# Patient Record
Sex: FEMALE | Race: BLACK OR AFRICAN AMERICAN | NOT HISPANIC OR LATINO | Employment: FULL TIME | URBAN - METROPOLITAN AREA
[De-identification: names, ages, dates, MRNs, and addresses within clinical notes are randomized per-mention and may not be internally consistent; named-entity substitution may affect disease eponyms.]

---

## 2022-03-02 ENCOUNTER — HISTORICAL (OUTPATIENT)
Dept: RADIOLOGY | Facility: HOSPITAL | Age: 62
End: 2022-03-02

## 2022-03-02 ENCOUNTER — HISTORICAL (OUTPATIENT)
Dept: ADMINISTRATIVE | Facility: HOSPITAL | Age: 62
End: 2022-03-02

## 2022-03-08 ENCOUNTER — HISTORICAL (OUTPATIENT)
Dept: RADIOLOGY | Facility: HOSPITAL | Age: 62
End: 2022-03-08

## 2022-03-08 ENCOUNTER — HISTORICAL (OUTPATIENT)
Dept: ADMINISTRATIVE | Facility: HOSPITAL | Age: 62
End: 2022-03-08

## 2022-03-18 ENCOUNTER — HISTORICAL (OUTPATIENT)
Dept: RADIOLOGY | Facility: HOSPITAL | Age: 62
End: 2022-03-18

## 2022-03-18 ENCOUNTER — HISTORICAL (OUTPATIENT)
Dept: ADMINISTRATIVE | Facility: HOSPITAL | Age: 62
End: 2022-03-18

## 2022-03-18 LAB — CREAT SERPL-MCNC: 0.85 MG/DL (ref 0.55–1.02)

## 2022-04-06 ENCOUNTER — HISTORICAL (OUTPATIENT)
Dept: ADMINISTRATIVE | Facility: HOSPITAL | Age: 62
End: 2022-04-06

## 2022-04-06 LAB
ALBUMIN SERPL-MCNC: 4.5 G/DL (ref 3.5–5)
ALBUMIN/GLOB SERPL: 1.3 {RATIO} (ref 1.1–2)
ALP SERPL-CCNC: 77 U/L (ref 40–150)
ALT SERPL-CCNC: 18 U/L (ref 0–55)
AST SERPL-CCNC: 18 U/L (ref 5–34)
BILIRUB SERPL-MCNC: 0.8 MG/DL
BILIRUBIN DIRECT+TOT PNL SERPL-MCNC: 0.3 (ref 0–0.5)
BILIRUBIN DIRECT+TOT PNL SERPL-MCNC: 0.5 (ref 0–0.8)
BUN SERPL-MCNC: 17.3 MG/DL (ref 9.8–20.1)
CALCIUM SERPL-MCNC: 10 MG/DL (ref 8.7–10.5)
CHLORIDE SERPL-SCNC: 102 MMOL/L (ref 98–107)
CO2 SERPL-SCNC: 28 MMOL/L (ref 22–29)
CREAT SERPL-MCNC: 0.8 MG/DL (ref 0.55–1.02)
GLOBULIN SER-MCNC: 3.5 G/DL (ref 2.4–3.5)
GLUCOSE SERPL-MCNC: 93 MG/DL (ref 74–100)
HEMOLYSIS INTERF INDEX SERPL-ACNC: <0
ICTERIC INTERF INDEX SERPL-ACNC: 1
LIPEMIC INTERF INDEX SERPL-ACNC: 2
POTASSIUM SERPL-SCNC: 4 MMOL/L (ref 3.5–5.1)
PROT SERPL-MCNC: 8 G/DL (ref 6.4–8.3)
SODIUM SERPL-SCNC: 138 MMOL/L (ref 136–145)

## 2022-04-09 ENCOUNTER — HISTORICAL (OUTPATIENT)
Dept: ADMINISTRATIVE | Facility: HOSPITAL | Age: 62
End: 2022-04-09
Payer: MEDICAID

## 2022-04-11 ENCOUNTER — HISTORICAL (OUTPATIENT)
Dept: SURGERY | Facility: HOSPITAL | Age: 62
End: 2022-04-11

## 2022-04-23 PROBLEM — Z72.0 TOBACCO USER: Status: ACTIVE | Noted: 2022-04-23

## 2022-04-26 VITALS
DIASTOLIC BLOOD PRESSURE: 110 MMHG | HEIGHT: 65 IN | WEIGHT: 127.19 LBS | BODY MASS INDEX: 21.19 KG/M2 | OXYGEN SATURATION: 100 % | SYSTOLIC BLOOD PRESSURE: 160 MMHG

## 2022-04-28 ENCOUNTER — HISTORICAL (OUTPATIENT)
Dept: ADMINISTRATIVE | Facility: HOSPITAL | Age: 62
End: 2022-04-28
Payer: MEDICAID

## 2022-04-28 LAB
ABS NEUT (OLG): 5.51 (ref 2.1–9.2)
ALBUMIN SERPL-MCNC: 4.3 G/DL (ref 3.5–5)
ALBUMIN/GLOB SERPL: 1.2 {RATIO} (ref 1.1–2)
ALP SERPL-CCNC: 72 U/L (ref 40–150)
ALT SERPL-CCNC: 15 U/L (ref 0–55)
AST SERPL-CCNC: 19 U/L (ref 5–34)
BASOPHILS # BLD AUTO: 0 10*3/UL (ref 0–0.2)
BASOPHILS NFR BLD AUTO: 1 %
BILIRUB SERPL-MCNC: 0.7 MG/DL
BILIRUBIN DIRECT+TOT PNL SERPL-MCNC: 0.2 (ref 0–0.5)
BILIRUBIN DIRECT+TOT PNL SERPL-MCNC: 0.5 (ref 0–0.8)
BUN SERPL-MCNC: 12.1 MG/DL (ref 9.8–20.1)
CALCIUM SERPL-MCNC: 10.3 MG/DL (ref 8.7–10.5)
CHLORIDE SERPL-SCNC: 103 MMOL/L (ref 98–107)
CO2 SERPL-SCNC: 29 MMOL/L (ref 22–29)
CREAT SERPL-MCNC: 0.74 MG/DL (ref 0.55–1.02)
EOSINOPHIL # BLD AUTO: 0.1 10*3/UL (ref 0–0.9)
EOSINOPHIL NFR BLD AUTO: 2 %
ERYTHROCYTE [DISTWIDTH] IN BLOOD BY AUTOMATED COUNT: 12.7 % (ref 11.5–14.5)
ESTRADIOL SERPL HS-MCNC: <24 PG/ML
FLAG2 (OHS): 70
FLAG3 (OHS): 80
FLAGS (OHS): 70
FSH SERPL-ACNC: 51.03 M[IU]/ML
GLOBULIN SER-MCNC: 3.6 G/DL (ref 2.4–3.5)
GLUCOSE SERPL-MCNC: 79 MG/DL (ref 74–100)
HCT VFR BLD AUTO: 42.8 % (ref 35–46)
HEMOLYSIS INTERF INDEX SERPL-ACNC: 1
HGB BLD-MCNC: 13.9 G/DL (ref 12–16)
ICTERIC INTERF INDEX SERPL-ACNC: 1
IMM GRANULOCYTES # BLD AUTO: 0.03 10*3/UL
IMM GRANULOCYTES NFR BLD AUTO: 0 %
IMM. NE 2 SUSPECT FLAG (OHS): 20
LIPEMIC INTERF INDEX SERPL-ACNC: 5
LOW EVENT # SUSPECT FLAG (OHS): 70
LYMPHOCYTES # BLD AUTO: 2.7 10*3/UL (ref 0.6–4.6)
LYMPHOCYTES NFR BLD AUTO: 30 %
MANUAL DIFF? (OHS): NO
MCH RBC QN AUTO: 30.6 PG (ref 26–34)
MCHC RBC AUTO-ENTMCNC: 32.5 G/DL (ref 31–37)
MCV RBC AUTO: 94.3 FL (ref 80–100)
MO BLASTS SUSPECT FLAG (OHS): 40
MONOCYTES # BLD AUTO: 0.6 10*3/UL (ref 0.1–1.3)
MONOCYTES NFR BLD AUTO: 7 %
NEUTROPHILS # BLD AUTO: 5.51 10*3/UL (ref 2.1–9.2)
NEUTROPHILS NFR BLD AUTO: 61 %
NRBC BLD AUTO-RTO: 0 % (ref 0–0.2)
PLATELET # BLD AUTO: 323 10*3/UL (ref 130–400)
PLATELET CLUMPS SUSPECT FLAG (OHS): 10
PMV BLD AUTO: 9.9 FL (ref 7.4–10.4)
POTASSIUM SERPL-SCNC: 4.3 MMOL/L (ref 3.5–5.1)
PROT SERPL-MCNC: 7.9 G/DL (ref 6.4–8.3)
RBC # BLD AUTO: 4.54 10*6/UL (ref 4–5.2)
SODIUM SERPL-SCNC: 140 MMOL/L (ref 136–145)
WBC # SPEC AUTO: 9.1 10*3/UL (ref 4.5–11)

## 2022-05-01 PROBLEM — I10 PRIMARY HYPERTENSION: Status: ACTIVE | Noted: 2022-05-01

## 2022-05-01 PROBLEM — E55.9 VITAMIN D DEFICIENCY: Status: ACTIVE | Noted: 2022-05-01

## 2022-05-01 PROBLEM — Z17.1 MALIGNANT NEOPLASM OF RIGHT BREAST IN FEMALE, ESTROGEN RECEPTOR NEGATIVE: Status: ACTIVE | Noted: 2022-05-01

## 2022-05-01 PROBLEM — C50.911 MALIGNANT NEOPLASM OF RIGHT BREAST IN FEMALE, ESTROGEN RECEPTOR NEGATIVE: Status: ACTIVE | Noted: 2022-05-01

## 2022-05-02 ENCOUNTER — OFFICE VISIT (OUTPATIENT)
Dept: FAMILY MEDICINE | Facility: CLINIC | Age: 62
End: 2022-05-02
Payer: MEDICAID

## 2022-05-02 VITALS
DIASTOLIC BLOOD PRESSURE: 88 MMHG | HEIGHT: 65 IN | WEIGHT: 120 LBS | BODY MASS INDEX: 19.99 KG/M2 | SYSTOLIC BLOOD PRESSURE: 134 MMHG | RESPIRATION RATE: 20 BRPM | TEMPERATURE: 98 F | OXYGEN SATURATION: 100 % | HEART RATE: 87 BPM

## 2022-05-02 DIAGNOSIS — E78.01 FAMILIAL HYPERCHOLESTEROLEMIA: Chronic | ICD-10-CM

## 2022-05-02 PROBLEM — E78.5 HLD (HYPERLIPIDEMIA): Chronic | Status: ACTIVE | Noted: 2022-05-02

## 2022-05-02 PROCEDURE — 99214 OFFICE O/P EST MOD 30 MIN: CPT | Mod: PBBFAC

## 2022-05-02 RX ORDER — PRAVASTATIN SODIUM 40 MG/1
40 TABLET ORAL DAILY
Qty: 30 TABLET | Refills: 1 | Status: SHIPPED | OUTPATIENT
Start: 2022-05-02 | End: 2022-08-08

## 2022-05-02 NOTE — PROGRESS NOTES
60 y/o female with a pmHx of HTN, tobacco use, ductal invasive carcinoma s/p a right sided mastectomy with sentinel node dissection who presents for follow up care.      Invasive Ductal Carcinoma:  S/p right sided mastectomy 4/11/22  To have mediport placed for four rounds of chemo   Pathology reveiwed and no malignancy        HTN:    BP today 134/88 on amlodipine 10 mg daily  Denies cp, SOB, palpitations, dizziness, edema of the extremities or other side effects    HLD:  Labs reviewed elevated , Tri 65, HDL 61,   ASCVD risk 14.3 %  Fmhx: mother-cva, father- CAD, sister-CAD, MI, brother-MI      HM:   No colonoscopy (Discussed with pt to get HM done after chemo)  No zoster vaccine   Covid Vaccine x 3 updated    Medications:   Amlodipine 10 mg daily  Vitamin D 50,000 IU every week  HCTZ 12.5 mg daily    KNDA     FmHx:  Diabetes mellitus type 1: Mother, Father and Sister. Heart disease: Father. Hypertension.: Mother, Father and Sister. Primary malignant neoplasm of bone: Brother. Primary malignant neoplasm of prostate: Brother. Stroke: Mother    Procedure/Surgical History   Biopsy or excision of lymph node(s); by needle, superficial (eg, cervical, inguinal, axillary) (03/08/2022)   Biopsy, breast, with placement of breast localization device(s) (eg, clip, metallic pellet), when performed, and imaging of the biopsy specimen, when performed, percutaneous; first lesion, including ultrasound guidance (03/08/2022)   Drainage of Right Breast, Percutaneous Approach, Diagnostic (03/08/2022)   Umbilical hernia repair (1966    Problem List/Past Medical History: Ongoing Tobacco user    ROS:  Review of Systems   Constitutional: Negative for chills and fever.   Respiratory: Negative for cough, shortness of breath and wheezing.    Cardiovascular: Negative for chest pain and palpitations.   Psychiatric/Behavioral: Negative for depression and suicidal ideas.           Physical Exam:  Vitals:    05/02/22 0845   BP: 134/88    Pulse: 87   Resp: 20   Temp: 98.1 °F (36.7 °C)     General: AAAO x 3  Cardiac: RRR, no murmurs or gallops  Pulm: BS CTA megha   GI: Abd soft, NT, ND, and bowel sds active x4   Ext: Edema megha BUE, BLE  Skin: right breast: 20 cm incision well approximated incision secured with steri-strips. No drainage, increased warmth or tenderness       A/P:  1. HTN  -Continue the amlodipine at 10 mg daily    2. HLD:  ASCVD risk 14.3 % start and stronf cardiovascular history. Started pravastatin 40 mg daily  Side effects of the medication discussed and symptoms to report to this provider and to stop the medication until seen by the provider    3. Invasive Ductal Carcinoma  Keep next oncology appointment on 5/13/22 at 10:30 am and to have an echo on 5/6/22 at 07:15     RTC in 6 weeks

## 2022-05-02 NOTE — PATIENT INSTRUCTIONS
HTN  -Continue the amlodipine at 10 mg daily     HLD:  ASCVD risk 14.3 % start and strong cardiovascular history. Started pravastatin 40 mg daily  Side effects of the medication discussed and symptoms to report to this provider and to stop the medication until seen by the provider     Invasive Ductal Carcinoma  Keep next oncology appointment on 5/13/22 at 10:30 am and to have an echo on 5/6/22 at 07:15      RTC in 6 weeks  -Continue the amlodipine at 10 mg daily

## 2022-05-05 RX ORDER — HYDROCHLOROTHIAZIDE 12.5 MG/1
12.5 CAPSULE ORAL DAILY
Qty: 90 CAPSULE | Refills: 0 | Status: SHIPPED | OUTPATIENT
Start: 2022-05-05 | End: 2022-10-28 | Stop reason: SDUPTHER

## 2022-05-05 RX ORDER — HYDROCHLOROTHIAZIDE 12.5 MG/1
12.5 CAPSULE ORAL DAILY
Qty: 90 CAPSULE | Refills: 0 | Status: SHIPPED | OUTPATIENT
Start: 2022-05-05 | End: 2022-05-05 | Stop reason: SDUPTHER

## 2022-05-05 RX ORDER — AMLODIPINE BESYLATE 10 MG/1
10 TABLET ORAL DAILY
Qty: 90 TABLET | Refills: 0 | Status: SHIPPED | OUTPATIENT
Start: 2022-05-05 | End: 2022-05-05 | Stop reason: SDUPTHER

## 2022-05-05 RX ORDER — AMLODIPINE BESYLATE 10 MG/1
10 TABLET ORAL DAILY
Qty: 90 TABLET | Refills: 0 | Status: SHIPPED | OUTPATIENT
Start: 2022-05-05 | End: 2022-08-08 | Stop reason: SDUPTHER

## 2022-05-06 ENCOUNTER — HOSPITAL ENCOUNTER (OUTPATIENT)
Dept: CARDIOLOGY | Facility: HOSPITAL | Age: 62
Discharge: HOME | End: 2022-05-06
Attending: INTERNAL MEDICINE
Payer: MEDICAID

## 2022-05-06 DIAGNOSIS — C50.919 BREAST CARCINOMA: Primary | ICD-10-CM

## 2022-05-06 DIAGNOSIS — C50.919 BREAST CARCINOMA: ICD-10-CM

## 2022-05-06 DIAGNOSIS — C50.919 CARCINOMA OF FEMALE BREAST, UNSPECIFIED ESTROGEN RECEPTOR STATUS, UNSPECIFIED LATERALITY, UNSPECIFIED SITE OF BREAST: Primary | ICD-10-CM

## 2022-05-06 LAB
AV INDEX (PROSTH): 0.65
AV MEAN GRADIENT: 3 MMHG
AV PEAK GRADIENT: 5 MMHG
AV VELOCITY RATIO: 0.69
CV ECHO LV RWT: 0.43 CM
DOP CALC AO PEAK VEL: 1.08 M/S
DOP CALC AO VTI: 24.9 CM
DOP CALC LVOT PEAK VEL: 0.75 M/S
DOP CALCLVOT PEAK VEL VTI: 16.2 CM
E WAVE DECELERATION TIME: 198.67 MSEC
E/A RATIO: 0.78
ECHO EF ESTIMATED: 52 %
ECHO LV POSTERIOR WALL: 0.89 CM (ref 0.6–1.1)
EJECTION FRACTION: 55 %
FRACTIONAL SHORTENING: 27 % (ref 28–44)
INTERVENTRICULAR SEPTUM: 0.87 CM (ref 0.6–1.1)
LEFT ATRIUM VOLUME MOD: 7.78 CM3
LEFT INTERNAL DIMENSION IN SYSTOLE: 3.04 CM (ref 2.1–4)
LEFT VENTRICLE DIASTOLIC VOLUME: 75.85 ML
LEFT VENTRICLE SYSTOLIC VOLUME: 36.22 ML
LEFT VENTRICULAR INTERNAL DIMENSION IN DIASTOLE: 4.14 CM (ref 3.5–6)
LEFT VENTRICULAR MASS: 112.44 G
LV LATERAL E/E' RATIO: 7.5 M/S
LVOT MG: 1.35 MMHG
LVOT MV: 0.55 CM/S
MV PEAK A VEL: 0.96 M/S
MV PEAK E VEL: 0.75 M/S
PISA MRMAX VEL: 4.15 M/S
PISA TR MAX VEL: 2.42 M/S
RA WIDTH: 3.4 CM
TDI LATERAL: 0.1 M/S
TR MAX PG: 23 MMHG
TRICUSPID ANNULAR PLANE SYSTOLIC EXCURSION: 1.64 CM

## 2022-05-06 PROCEDURE — 93306 TTE W/DOPPLER COMPLETE: CPT

## 2022-05-13 ENCOUNTER — OFFICE VISIT (OUTPATIENT)
Dept: HEMATOLOGY/ONCOLOGY | Facility: CLINIC | Age: 62
End: 2022-05-13
Payer: MEDICAID

## 2022-05-13 VITALS
HEART RATE: 114 BPM | SYSTOLIC BLOOD PRESSURE: 157 MMHG | WEIGHT: 124.38 LBS | DIASTOLIC BLOOD PRESSURE: 94 MMHG | BODY MASS INDEX: 21.24 KG/M2 | OXYGEN SATURATION: 100 % | TEMPERATURE: 100 F | HEIGHT: 64 IN

## 2022-05-13 DIAGNOSIS — C50.911 INVASIVE DUCTAL CARCINOMA OF BREAST, FEMALE, RIGHT: Primary | ICD-10-CM

## 2022-05-13 DIAGNOSIS — Z85.3 HISTORY OF RIGHT BREAST CANCER: Primary | ICD-10-CM

## 2022-05-13 PROCEDURE — 3008F PR BODY MASS INDEX (BMI) DOCUMENTED: ICD-10-PCS | Mod: CPTII,,, | Performed by: NURSE PRACTITIONER

## 2022-05-13 PROCEDURE — 1160F PR REVIEW ALL MEDS BY PRESCRIBER/CLIN PHARMACIST DOCUMENTED: ICD-10-PCS | Mod: CPTII,,, | Performed by: NURSE PRACTITIONER

## 2022-05-13 PROCEDURE — 3080F PR MOST RECENT DIASTOLIC BLOOD PRESSURE >= 90 MM HG: ICD-10-PCS | Mod: CPTII,,, | Performed by: NURSE PRACTITIONER

## 2022-05-13 PROCEDURE — 3008F BODY MASS INDEX DOCD: CPT | Mod: CPTII,,, | Performed by: NURSE PRACTITIONER

## 2022-05-13 PROCEDURE — 3080F DIAST BP >= 90 MM HG: CPT | Mod: CPTII,,, | Performed by: NURSE PRACTITIONER

## 2022-05-13 PROCEDURE — 1159F MED LIST DOCD IN RCRD: CPT | Mod: CPTII,,, | Performed by: NURSE PRACTITIONER

## 2022-05-13 PROCEDURE — 99215 OFFICE O/P EST HI 40 MIN: CPT | Mod: PBBFAC | Performed by: NURSE PRACTITIONER

## 2022-05-13 PROCEDURE — 1160F RVW MEDS BY RX/DR IN RCRD: CPT | Mod: CPTII,,, | Performed by: NURSE PRACTITIONER

## 2022-05-13 PROCEDURE — 1159F PR MEDICATION LIST DOCUMENTED IN MEDICAL RECORD: ICD-10-PCS | Mod: CPTII,,, | Performed by: NURSE PRACTITIONER

## 2022-05-13 PROCEDURE — 3077F SYST BP >= 140 MM HG: CPT | Mod: CPTII,,, | Performed by: NURSE PRACTITIONER

## 2022-05-13 PROCEDURE — 99214 PR OFFICE/OUTPT VISIT, EST, LEVL IV, 30-39 MIN: ICD-10-PCS | Mod: S$PBB,,, | Performed by: NURSE PRACTITIONER

## 2022-05-13 PROCEDURE — 3077F PR MOST RECENT SYSTOLIC BLOOD PRESSURE >= 140 MM HG: ICD-10-PCS | Mod: CPTII,,, | Performed by: NURSE PRACTITIONER

## 2022-05-13 PROCEDURE — 99214 OFFICE O/P EST MOD 30 MIN: CPT | Mod: S$PBB,,, | Performed by: NURSE PRACTITIONER

## 2022-05-13 RX ORDER — HYDROCODONE BITARTRATE AND ACETAMINOPHEN 5; 325 MG/1; MG/1
TABLET ORAL
COMMUNITY
Start: 2022-04-11 | End: 2022-05-13 | Stop reason: SDUPTHER

## 2022-05-13 RX ORDER — DICLOFENAC SODIUM 75 MG/1
75 TABLET, DELAYED RELEASE ORAL
COMMUNITY
Start: 2021-12-30 | End: 2022-05-20 | Stop reason: CLARIF

## 2022-05-13 RX ORDER — AMLODIPINE BESYLATE 10 MG/1
10 TABLET ORAL
COMMUNITY
Start: 2022-04-08 | End: 2022-05-20 | Stop reason: ALTCHOICE

## 2022-05-13 RX ORDER — ONDANSETRON 8 MG/1
8 TABLET, ORALLY DISINTEGRATING ORAL EVERY 8 HOURS PRN
Qty: 90 TABLET | Refills: 2 | Status: SHIPPED | OUTPATIENT
Start: 2022-05-13 | End: 2022-08-08

## 2022-05-13 RX ORDER — ERGOCALCIFEROL 1.25 MG/1
50000 CAPSULE ORAL
COMMUNITY
Start: 2022-04-08 | End: 2022-07-01

## 2022-05-13 RX ORDER — DOCUSATE SODIUM 100 MG/1
100 CAPSULE, LIQUID FILLED ORAL
COMMUNITY
Start: 2022-04-11 | End: 2022-08-08

## 2022-05-13 RX ORDER — METHOCARBAMOL 750 MG/1
TABLET, FILM COATED ORAL
COMMUNITY
Start: 2022-04-11 | End: 2022-08-08

## 2022-05-13 RX ORDER — HYDROCODONE BITARTRATE AND ACETAMINOPHEN 5; 325 MG/1; MG/1
TABLET ORAL
Qty: 42 TABLET | Refills: 0 | Status: SHIPPED | OUTPATIENT
Start: 2022-05-13 | End: 2022-08-08

## 2022-05-13 NOTE — PROGRESS NOTES
Problem List:   IDC Right Breast, Triple Negative    History of Present Illness Past medical history: Tobacco abuse. Hypertension. Arthritis of right shoulder.    Procedure/surgical history:  Umbilical hernia repair .    Social history: . Lives in [place], [place]. Has 4 children. Works as a  in a [employer]. Has been smoking 5 cigarettes daily for 15 years. Drinks half a pint of liquor over the weekend. No illicit drugs.    Family history:  2 brothers experienced prostate cancer in their 50s  Father  from prostate cancer in his 60s-70s  No family history of breast cancer.    Menstrual and OB/GYN history: Menarche, age 14. Menopause, late 40s. First child at age 26 years. No abortions or miscarriages. Used birth control pills for 8 years. Did not use hormone replacement therapy after menopause. Breast-fed 1 child for 7 months.    Current Treatment:      Treatment History:  2022: Right mastectomy, right SLN biopsy      History of Present Illness:  61-year-old female referred from surgery clinic, with newly diagnosed right breast cancer.    She never palpated breast mass. She noticed breast/chest wall pain after extensive muscle use in 2021. At that time, she was diagnosed with musculoskeletal pain. In January, she went back to emergency department when symptoms did not resolve. At that time, physical examination apparently noted irregularity in breast and she was referred for further imaging and biopsy.      Interval History 2022: Patient presents with her son today for chemotherapy teaching on DDAC and Taxol. Discussed treatment regimen and potential side effects. We also discussed treatment options for any presenting symptoms. Patient consents to starting DDAC post mediport placement which is scheduled for 2022.      ROS:  Constitutional: No fever, chills, weight loss, weakness or fatigue  Eye: No visual disturbances or changes in vision, no double  vision  ENMT: no decreased hearing, nasal congestion, nosebleeds, sore throat, taste disturbance, tinnitus, vertigo  Respiratory: No shortness of breath, cough, sputum production, hemoptysis or pleuritic type chest pain  Cardiovascular: No chest pain, palpitations, syncope, leg swelling  Gastrointestinal: No nausea, vomiting, diarrhea, constipation, heartburn, abdominal pain  Genitourinary: No CVA tenderness  Hematology/lymph: no abnormal bruising, hemorrhage, petechiae swollen lymph glands  Musculoskeletal: No back or neck pain, joint pain, muscle pain or decreased range of motion  Integumentary: No rash, pruritus, skin lesion or any other significant skin complaints  Neurologic: alert and oriented x4, no abnormal balance, confusion, numbness, tingling, headache  Psychiatric: No anxiety, depression, suicidal or homicidal ideations  12 point ROS done in full with pertinent positives as described in interval history. Remainder of ROS are negative.     Patient Instructions  Discussed:  *Goals of therapy to be:Curative  * Premedication  *Chemotherapy agents  Chemotherapy education:   Discussed common side effects to include nausea and vomiting, which is quite manageable, alopecia, taste changes, mouth ulcers, fatigue, fertility issues, low blood counts that may require transfusion, peripheral neuropathy (42% to 70%; grade 3/4: Less than 7%) arthralgia and or myalgias, and rare side effects including cardiac toxicity.  Discussed chemotherapy (Cytoxan, Adriamycin) to be given every 2 weeks for 4 cycles followed by Taxol every 2 weeks x4 cycles or Taxol once a week x12 weeks.   Notify our office for any problems or concerns, specifically shortness of breath, swelling, chest pain or fast heartbeat, intractable pain or nausea and vomiting, fever greater than 100.4, extreme fatigue or weakness.  Self-care tips include good hygiene in frequent handwashing to avoid infection, anti-nausea medicines to reduce nausea, soft  toothbrush and mouth rinses 3 times a day with 1 teaspoon of baking soda with 8 ounces of water to prevent and treat mouth sores, avoid contact sports, avoid sun exposure and apply sunblock with SPF 30 or higher, drink 2-3 quarts of water every 24 hours, and maintaining good nutrition.  * Short term and long term effects of treatment  * Signs and symptoms to call clinic--->Notify our office for any problems or concerns, specifically shortness of breath, swelling, chest pain or fast heartbeat, intractable pain or nausea and vomiting, fever greater than 100.4, extreme fatigue or weakness.  * Written material given to patient  * All questions answered; patient verbalized understanding.      Review of Systems: A complete 12-point ROS was performed in full with pertinent positives as described in interval history. Remainder of ROS done in full and are negative.      Physical Exam    Vitals & Measurements  Vitals:    05/13/22 0838   BP: (!) 157/94   Pulse: (!) 114   Temp: 99.5 °F (37.5 °C)         Physical Exam:  General: Alert and oriented, No acute distress.   Appearance: Well-groomed wearing mask  HEENT: Normocephalic, Oral mucosa is moist. Pupils are equal, round and reactive to light, Extraocular movements are intact, Normal conjunctiva.   Neck: Supple, Non-tender, No lymphadenopathy, No thyromegaly.   Respiratory: Lungs are clear to auscultation, Respirations are non-labored, Breath sounds are equal, Symmetrical chest wall expansion.   Cardiovascular: Normal rate, Regular rhythm, No edema.   Breast: Right mastectomy, steri strips in place, without drainage. Left breast without any abnormalaties  Gastrointestinal: Rounded, Soft, Non-tender, Non-distended, Normal bowel sounds.   Musculoskeletal: Normal strength. Ambulates without assistance  Integumentary: Warm, dry, intact.   Neurologic: Alert, Oriented, No focal deficits, Cranial Nerves II-XII are grossly intact.   Cognition and Speech: Oriented, Speech clear and  coherent.   Psychiatric: Cooperative, Appropriate mood & affect.   ECOG Performance Scale: 0 - Capable of all self-care no restrictions    Assessment:  #IDC right breast, triple negative:  -03/02/2022: Bilateral diagnostic mammogram (chest wall pain)  -03/08/2022: Needle core biopsy  -04/11/2022: Right simple mastectomy and SLN biopsy  -2.2 cm, grade 3, no LVI, margins negative, 4 lymph nodes negative (including 3 negative SLN's)  >>   pT2 pN0 MX, G3, triple negative, AJCC anatomic stage IIA, pathological prognostic stage IIA      Plan:  -Assuming postmenopausal (last menstrual cycle in her late 40s)  -Check serum FSH and estradiol level because age <60    -Needs genetic testing because of triple negative breast cancer; also, family history of prostate cancer    -Has no signs and symptoms of metastatic disease, no indication of additional imaging studies    -pT2 pN0, status postmastectomy  -Does not need adjuvant radiotherapy    -Needs adjuvant chemotherapy  -We will treat with DD AC every 2 weeks x4 cycles, followed by adjuvant Taxol  -LVEF 55%-5/5/2022  -Mediport placement scheduled 5/19/2022    Proposed adjuvant chemotherapy:  1. Doxorubicin 60 mg/m² IV day 1;  2. Cyclophosphamide 600 mg/m² IV day 1;  Cycled every 14 days for 4 cycles; followed by,  3. Paclitaxel 175 mg/m² by 3 hour IV infusion on day 1, cycled every 14 days for 4 cycles; or, paclitaxel 80 mg/m² IV 1 hour infusion weekly for 12 weeks.  All dose-dense cycles are with myeloid growth factor support.    -Consider adjuvant bisphosphonate therapy for risk reduction of distant metastasis for 3-5 years in postmenopausal patients (natural or induced) with high risk node-negative or node positive tumors  >>>   -Will offer adjuvant bisphosphonate therapy to this patient    -Addition of 1 year of adjuvant olaparib is an option in this patient with pT2 triple negative tumor if germline testing returns positive for BRCA1/2 mutation    -RTC on 5/23/2022 with  CBC,CMP,MG  (Infusion nurses to review) if lab work WNL may proceed with cycle 1 of DDAC    Above discussed at length with her. All questions answered.  Chemotherapy teaching provided in detail with printed material  Discussed plan of management going forward.  She understands and agrees this plan, and was appreciative.

## 2022-05-14 NOTE — OP NOTE
Indication for Surgery  Ms. Ugalde is a 60 yo female with a?history of?HTN and tobacco?followed in clinic for R upper outer breast mass, biopsy proven invasive ductal carcinoma, ER-AR-HER2-. Discussion with patient held, and decision was made to proceed with R mastectomy and SLNB. Consents signed in clin  Preoperative Diagnosis  1) Right breast invasive ductal carcinoma  Postoperative Diagnosis  1) Right breast invasive ductal carcinoma  Operation  1) Right mastectomy  2) Right sentinel lymph node biopsy  Surgeon(s)  Staff: MD Silvio  Resident:  MD Sharmila Eason MD  Anesthesia  GETA  Estimated Blood Loss  30cc  Findings  Palpable mass in the right upper outer quadrant. 3 sentinel lymph nodes taken (1) hot/blue 1620, 2) hot/blue 880 3) hot/blue 160). Additional 1 palpable lymph node taken  Specimen(s)  3 sentinel lymph nodes taken (1) hot/blue 1620, 2) hot/blue 880 3) hot/blue 150). Additional 1 palpable lymph node taken  Technique  Prior to the operating room the patient was injected with Technetium-99 (TC-99) sulfur colloid by the nuclear medicine. After informed consent was obtained, the patient was then brought to the operating room and placed supine on the operative table. She underwent induction of general endotracheal anaesthesia with no acute adverse events.?5 cc?s of isosulfan blue dye was injected in the subareolar space and gently massaged on the rightside. The skin of right breast was prepped and draped in standard sterile surgical fashion along with the right axilla and upper arm. A time-out was completed verifying correct patient, procedure, site, positioning and equipment prior to beginning the procedure.?  ?  A large elliptical skin incision was made that encompassed the nipple-areola complex of the right breast. Flaps were raised in the avascular plane between subcutaneous tissue and breast tissue from the clavicle superiorly, the sternum medially, the anterior rectus sheath inferiorly, and the  lateral border of the pectoralis major muscle laterally. Hemostasis was achieved in the flaps. Next, the breast tissue and underlying pectoralis fascia were excised from the pectoralis major muscle, progressing from medial to lateral. At the lateral border of the pectoralis major muscle, the breast tissue was rotated laterally and a lateral pedicle was?identified where breast tissue gave way to fat of axilla. The breast tissue?was then completely removed and oriented with silks.  ?   Attention was then turned to the sentinel lymph node biopsy. Using a hand-held gamma probe ("[employer] the axilla was assessed for a sentinel lymph node. The lymph node was excised and the gamma probe was placed next to it which measured 1600s.?An additional lymph node was/ identified (and removed),with the probe reading in the 800s. A final lymph node with probe reading 160s?was palpated and taken as specimen. The lymph nodes were sent as specimen sentinel lymph node #1, #2, #3. Additional palpable but not hot or blue lymph node taken .  ?   Skin?was closed with interrupted 3-0?Monocryl sutures. The subdermal layers were closed with 4-0 PDS?subcuticular running skin closures. Steri-strips were then applied on top followed by 4x4 gauze.  ?   Dr. Anguiano was present and scrubbed for all key portions of the case  ?  Shane Eason, PGY-3  ?  This certifies I was present during the entire period between opening and closing of the surgical field.  ?  Heladio Anguiano MD  ?

## 2022-05-14 NOTE — H&P
Preoperative Updated H&P  ?   I have read the below H&P. The patient has not had any new changes in her medical history since then. She is overall feeling well today. She has no further questions at this time. She does not take blood thinners. She took her Norvasc this am. She denies SOB, CP, fevers, chills, nausea, or vomiting  ?   We will proceed with scheduled R mastectomy with sentinel lymph node biopsy.  ?   Kae Doll MD?  ?  Pt with triple negative 2.7 x 2.5cm IDC with a small satellite lesion  as well, here for a Right Total Mastectomy with Right Axillary SLN Bx.  Pt is appropriately prepared for procedure.  ?  Heladio Anguiano MD  ?  ?  ?  ?  ?  ?  ?  Chief Complaint  Right Breast Cancer  History of Present Illness  Ms. Ugalde is a 62 yo female with a?history of?HTN and tobacco use here for followup?after abnormal breast biopsy in March 2022. Biopsy showed R upper outer breast mass characterized as invasive carcinoma no specific type (ductal). Tubule formation score 3, nuclear pleomorphism score 3, mitotic count score 3 with overall grade 3 (score 9/9). Pathology reports triple negativity for?hormonal receptors estrogen, HER2, and progesterone.  ?  Today?patient?feels well?overall and is good spirts. She does endorse slight discomfort in the location of the mass. No other complaints at this time.  Patient has never palpated masses previously and has?not had any other?symptoms. Her last mammogram was in 2018 and was negative. Notes that current?workup significant?was made?after seeking care in urgent care clinic due to?breast/chest pain?after extensive muscle use in December of 2021. At the time she was diagnosed with?musculoskeletal pain. In January, went back to the ED after symptoms?did not resolve. Irregularity was palpated on physical exam and at that time they referred her to get a biopsy.  She has informed her family and friends of her diagnosis and has a lot of support. Continues to have a?good  appetite. Denies weight loss, fatigue, HA, weakness, SOB, chest pain, N/V, constipation and diarrhea.  ?  History:  Patient started menstruation at 15 yo.  On hormonal contraceptives throughout adult life.  Pregnancy history:   Past Surgical History:  umbilical hernia repair  FH:  mother: Type 1 DM, HTN, stroke  father: HTN, Type 1DM, Heart disease  Sister Type 1 DM, HTN  Paternal aunts and cousins with breast cancer  Allergies: none  SH: Lives at home with daughter and . Feels safe at home. Daughter graduating from [employer] school May 2022  Tobacco use: Smokes four cigarettes a day ?  Alcohol: Drinks half a pint of liquor on the weekends  ?  Review of Systems  Constitutional:?no fever, fatigue, weakness  Eye:?no vision loss  ENMT:?no sore throat  Respiratory:?no cough, no wheezing, no shortness of breath  Cardiovascular:?no chest pain, no palpitations,  Gastrointestinal:?no nausea, vomiting, or diarrhea. No abdominal pain  Genitourinary:?no dysuria, no urinary frequency or urgency, no hematuria  Hema/Lymph:?no abnormal bruising or bleeding  Endocrine:?no heat or cold intolerance, no excessive thirst or excessive urination  Musculoskeletal:?no muscle or joint pain, no joint swelling  skin:?no skin rash or abnormal lesion  Neuro: no headache, no dizziness, no weakness or numbness  ?  Physical Exam  ??Vitals & Measurements  ??HR:?94(Peripheral)? RR:?20? BP:?182/103?  ??HT:?158.00?cm? WT:?54.400?kg? BMI:?21.79?  General:?Alert and oriented, No acute distress.  Eye: Extraocular movements are intact  HENT:?Normal hearing.  Neck: Supple, Non-tender  Cardiovascular:?Normal rate, Regular rhythm.  Respiratory:?Lungs are clear to auscultation, Respirations are non-labored, Breath sounds are equal, Symmetrical chest wall expansion.  Breast: Right?lateral breast mass at 9 oclock position. (2.5 cm, solid, well circumscribed, nontender)  symmetric areolas bilaterally, no nipple discharge, erythema, or fibrocystic  changes.  Cardiovascular:?Normal rate, Regular rhythm.  Lymphatics: Palpable right axillary lymph node  MSK: normal strength, normal ROM  Neuro: Functional cognition intact.?  ?  Assessment/Plan  ?  Ms. Ugalde is a 60 yo f hx HTN and tobacco use here for after abnormal breast biopsy characterized as invasive carcinoma no specific type (ductal) with triple negative pathology. Surgical options including mastectomy vs lumpectomy?discussed and patient requests full removal of breast.  ?  Invasive carcinoma of right breast  -Will schedule Simple mastectomy with sentinel lymph node dissection  -Consult oncology for further management and recommendations  ?  Kiley Su, MS3  ?  PGY-1 Addendum  61-year-old female?with newly diagnosed hypertension?and history of tobacco use?presenting?to clinic?after biopsy confirmed?right?breast ER-, HI-, HER2- invasive?ductal carcinoma with negative ultrasound-guided axillary?lymph node biopsy.??Discussed risks, benefits, alternatives, complications?of simple?mastectomy of?right breast?with sentinel lymph node biopsy.? All questions answered.? Consent was obtained.??Proceed to the OR on 4/11/2022?for?right simple mastectomy with sentinel lymph node biopsy.? Patient with hypertension, has appointment to see?PCP?on 4/4/2022.  ?  Cookie Parr MD  [facility] General Surgery PGY-1  ?  Above Hx and assessment reviewed and discussed with Resident.  Agree with plan of care.  ?  Heladio Anguiano MD  Referrals  ?Clinic Follow up, *Est. 04/21/22 3:00:00 CDT, follow-up 1 week after mastectomy, Order for future visit, Invasive ductal carcinoma of right breast, [facility] [1]  [1]?Office Visit Note; Kiley Su 03/31/2022 13:57 CDT

## 2022-05-19 ENCOUNTER — OFFICE VISIT (OUTPATIENT)
Dept: SURGERY | Facility: CLINIC | Age: 62
End: 2022-05-19
Payer: MEDICAID

## 2022-05-19 VITALS
DIASTOLIC BLOOD PRESSURE: 85 MMHG | TEMPERATURE: 99 F | RESPIRATION RATE: 18 BRPM | OXYGEN SATURATION: 99 % | BODY MASS INDEX: 21.58 KG/M2 | HEART RATE: 100 BPM | HEIGHT: 63 IN | WEIGHT: 121.81 LBS | SYSTOLIC BLOOD PRESSURE: 125 MMHG

## 2022-05-19 DIAGNOSIS — Z01.818 PRE-OP TESTING: ICD-10-CM

## 2022-05-19 DIAGNOSIS — C50.919 TRIPLE NEGATIVE MALIGNANT NEOPLASM OF BREAST: Primary | ICD-10-CM

## 2022-05-19 PROCEDURE — 99215 OFFICE O/P EST HI 40 MIN: CPT | Mod: PBBFAC

## 2022-05-19 RX ORDER — SODIUM CHLORIDE 9 MG/ML
INJECTION, SOLUTION INTRAVENOUS CONTINUOUS
Status: CANCELLED | OUTPATIENT
Start: 2022-05-19

## 2022-05-19 RX ORDER — HEPARIN SODIUM 5000 [USP'U]/ML
5000 INJECTION, SOLUTION INTRAVENOUS; SUBCUTANEOUS ONCE
Status: CANCELLED | OUTPATIENT
Start: 2022-05-19

## 2022-05-19 NOTE — PROGRESS NOTES
Pt seen by Dr. Verduzco; Scheduled for a mediport placement on 23May2022; Pt instructed to return to clinic as needed; Discharge paperwork given w/pt verbalizing understanding

## 2022-05-19 NOTE — LETTER
May 19, 2022    Cookie Ugalde  [address] [address]             [facility]  [address] [address]  Phone: [phone] To whom it may concern,    Cookie Ugalde was seen in our clinic today to schedule her Mediport placement. Unfortunately, we are unable to schedule her until Monday. We should be able to get her done first case (07:00) so Mediport will be OK to access later that morning. We will ensure that her labs get drawn pre-operative.    Feel free to reach out with any questions,          Wilfred Verduzco II, MD

## 2022-05-19 NOTE — PROGRESS NOTES
[facility] General Surgery  New Patient Note    HPI:  60 yo F with a PMHx of Triple (-) IDC of the R breast (s/p Mastectomy 04/11/22) who presents today for scheduling of MediPort placement; pt tentatively to start ddAC on 05/23. No complaints of increasing pain / new symptoms. No new questions or concerns. Denies any previous central catheterization.    Vitals:  Vitals:    05/19/22 1201   BP: 125/85   Pulse: 100   Resp: 18   Temp: 99.3 °F (37.4 °C)   BMI 21.6    Physical Exam:  Gen: NAD  Neuro: awake, alert, answering questions appropriately  CV: RRR  Resp: non-labored breathing, EDWAR  Ext: moves all 4 spontaneously and purposefully  Skin: warm, well perfused  Breast: R mastectomy site with steristrips overlying, well-healing. Left neck with no scars. No scars on left chest wall.      Assessment/Plan:  60 yo F with a PMHx of Triple (-) IDC of the R breast (s/p Mastectomy 04/11) who presents today for scheduling of MediPort placement; pt tentatively to start ddAC on 05/23. The pt can be scheduled for the morning of 05/23 prior to initiation of chemo. Reviewed consents with patients. All questions and concerns addressed at this time.     Bc Perez   [facility] General Surgery, MS3  05/19/2022 12:03 PM         Addendum  Patient seen and examined.  Agree with above, edited as needed.    Scheduled for Mediport Monday morning. Unfortunately, unable to schedule before that. Scheduled to start chemo on Monday as well. Will attempt to get her done first case.    Patient instructed to let her Infusion Clinic here at [facility] know the plan. Provided with letter to bring to them as well, since she said she was heading to the clinic after this appt.    Consent obtained. Will get her labs drawn pre-op morning of surgery.    Wilfred Verduzco MD  HO-3, [facility] General Surgery  05/19/2022

## 2022-05-19 NOTE — PROGRESS NOTES
I have reviewed the note and assessment with the Resident and I agree with the documentation and plan of care.

## 2022-05-20 NOTE — HISTORICAL [facility]
This is a historical note converted from Barbara. Formatting and pictures may have been removed.  Please reference Barbara for original formatting and attached multimedia. History of Present Illness  Past medical history: Tobacco abuse.? Hypertension.? Arthritis of right shoulder.  Procedure/surgical history:  Umbilical hernia repair .  Social history:?.? Lives in [place] [id number].? Has 4 children.? 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 Has been smoking 5 cigarettes daily for 15 years.? Drinks half a pint of liquor over the weekend. ?No illicit drugs.  Family history:  2 brothers experienced prostate cancer?in their 50s  Father  from prostate cancer in his 60s-70s  No family history of breast cancer.  Menstrual and OB/GYN history:?Menarche, age 14.? Menopause, late 40s.? First child at age 26 years.? No abortions or miscarriages.? Used birth control pills?for 8 years.? Did not use hormone replacement therapy after menopause.? Breast-fed 1 child?for 7 months.  ?  ?  History of present illness:  61-year-old female referred from surgery clinic, with newly diagnosed right breast cancer.  ?  She never palpated breast mass.? She noticed breast/chest wall pain after extensive muscle use in 2021.? At that time, she was diagnosed with musculoskeletal pain.? In January, she went back to emergency department when symptoms did not resolve.? At that time, physical examination apparently noted irregularity in breast and she was referred for further imaging and biopsy.  ?  2022: Bilateral diagnostic mammogram with tomosynthesis, limited ultrasound right breast (indication: Right breast lump, upper outer quadrant) (comparison: 2018):  -Right breast: Suspicious abnormality (BI-RADS 4)  -Left breast: Negative (BI-RADS 1)  ?  2022:  1.? Right axillary lymph node with 3 mm cortex at 10:00, 10 cm from nipple, ultrasound-guided needle core biopsy: No evidence of carcinoma;  2.? Right  upper outer breast 2.6 x 1.5 x 2.7 cm mass at 10:00, 7 cm from nipple, ultrasound-guided needle core biopsy: Invasive ductal carcinoma; overall grade 3  ?  ER negative (0%).? WI negative (0%).? HER2 negative (score 0).? Ki-67 high proliferation (90%).  ?  03/18/2022: Bilateral diagnostic mammogram with contrast: Limited ultrasound right breast:  -Right breast: Known biopsy-proven malignancy (BI-RADS 5)  -Left breast: Negative (BI-RADS 1)  ?  04/11/2022: Right mastectomy, right SLN biopsy  ?  04/11/2022:  1.? Right breast, simple mastectomy: Invasive ductal carcinoma; overall grade 3; 2.2 cm; no DCIS; no LVI; all margins negative (closest margin, 5 mm; all regional lymph nodes negative for tumor; number of lymph nodes examined, 4; number of SLNs examined, 3; number of lymph nodes with metastasis, 0  >>pT2 pN0  ?  ?  04/28/2022:  Presents for initial medical oncology consultation, accompanied by her sister and daughters.? Very pleasant lady.? Appears healthy.? In no acute discomfort.  -Apart from postoperative right chest wall and right axillary pain, no other symptoms.? No weakness, fatigue, malaise, fevers, chills, unusual headaches, focal neurological symptoms, chest pain, cough, dyspnea, hemoptysis, abdominal pain, nausea, vomiting, anorexia, unintentional weight loss, postmenopausal spotting, bone pains, any urinary problems, etc.? ECOG 0.  ?  ?  Interval history:  ?  ?   Review of systems:  All systems reviewed, and found to be negative except for the symptoms detailed above.  ?  ?  Physical examination:  VITAL SIGNS:? Reviewed.? ?  GENERAL:? In no apparent distress.?  HEAD:? No signs of head trauma.  EYES:? Pupils are equal.? Extraocular motions intact.?  EARS:? Hearing grossly intact.  MOUTH:? Oropharynx is normal.  NECK:? No adenopathy, no JVD.? ?  CHEST:? Chest with clear breath sounds bilaterally.? No wheezes, rales, or rhonchi.?  CARDIAC:? Regular rate and rhythm.? S1 and S2, without murmurs, gallops, or  rubs.  VASCULAR:? No Edema.? Peripheral pulses normal and equal in all extremities.  ABDOMEN:? Soft, without detectable tenderness.? No sign of distention.? No? ?rebound or guarding, and no masses palpated.? ?Bowel Sounds normal.  MUSCULOSKELETAL:? Good range of motion of all major joints. Extremities without clubbing, cyanosis or edema.?  NEUROLOGIC EXAM:? Alert and oriented x 3.? No focal sensory or strength deficits.? ?Speech normal.? Follows commands.  PSYCHIATRIC:? Mood normal.  SKIN:? No rash or lesions.  ?  ?  Assessment:  #IDC right breast, triple negative:  -03/02/2022: Bilateral diagnostic mammogram (chest wall pain)  -03/08/2022: Needle core biopsy  -04/11/2022: Right simple mastectomy and SLN biopsy  -2.2 cm, grade 3, no LVI, margins negative, 4 lymph nodes negative (including 3 negative SLNs)  >>  pT2 pN0 MX, G3, triple negative, AJCC anatomic stage IIA, pathological prognostic stage IIA  ?  ?  Plan:  -Assuming postmenopausal?(last?menstrual cycle in her late 40s)  -Check serum FSH and estradiol level because age <60  ?  -Needs genetic testing because of triple negative breast cancer; also, family history of prostate cancer  ?  -Has no?signs and symptoms of metastatic disease, no indication of additional imaging studies  ?  -pT2 pN0, status postmastectomy  -Does not need adjuvant radiotherapy  ?  -Needs adjuvant chemotherapy  -We will treat with DD AC every 2 weeks x4 cycles, followed by adjuvant Taxol  -Will assess LVEF with TTE at this time  -Will refer to surgery for Mediport placement for chemotherapy  ?  Proposed adjuvant chemotherapy:  1. Doxorubicin 60 mg/m? IV day 1;  2. Cyclophosphamide 600 mg/m? IV day 1;  Cycled every 14 days for 4 cycles; followed by,  3.? Paclitaxel 175 mg/m? by 3 hour IV infusion on day 1, cycled every 14 days for 4 cycles; or, paclitaxel 80 mg/m? IV 1 hour infusion weekly for 12 weeks.  All dose-dense cycles are with myeloid growth factor support.  ?  -Consider adjuvant  bisphosphonate therapy for risk reduction of distant metastasis for 3-5 years in postmenopausal patients (natural or induced) with high risk node-negative or node positive tumors  >>>  -Will offer adjuvant bisphosphonate therapy to this patient  ?  -Addition of 1 year of adjuvant olaparib is an option in this patient with pT2 triple negative tumor if germline testing returns positive for BRCA1/2 mutation  ?  Follow-up visit in 2 weeks?after echocardiogram.  ?  Above discussed at length with her. ?All questions answered.  Discussed labs, scans, pathology report,?breast prognostic panel, and breast cancer staging, and shared?relevant reports with her.  Discussed plan of management going forward.  She understands and agrees this plan, and was appreciative.   Problem List/Past Medical History  Ongoing  Tobacco user  Historical  Pregnant  Pregnant  Pregnant  Pregnant  Procedure/Surgical History  Biopsy or excision of lymph node(s); open, deep axillary node(s) (04/11/2022)  Biopsy Sentinal Node (Right) (04/11/2022)  Excision of Right Axillary Lymphatic, Open Approach, Diagnostic (04/11/2022)  Excision of Right Breast, Open Approach (04/11/2022)  Injection procedure; radioactive tracer for identification of sentinel node (04/11/2022)  Mastectomy Simple (Right) (04/11/2022)  Mastectomy, simple, complete (04/11/2022)  Biopsy or excision of lymph node(s); by needle, superficial (eg, cervical, inguinal, axillary) (03/08/2022)  Biopsy, breast, with placement of breast localization device(s) (eg, clip, metallic pellet), when performed, and imaging of the biopsy specimen, when performed, percutaneous; first lesion, including ultrasound guidance (03/08/2022)  Drainage of Right Breast, Percutaneous Approach, Diagnostic (03/08/2022)  Umbilical hernia repair (1966)   Medications  amlodipine 10 mg oral tablet, 10 mg= 1 tab(s), Oral, Daily, 3 refills  amLODIPine 5 mg oral tablet, 5 mg= 1 tab(s), Oral, Daily,? ?Not Taking, Completed  Rx  clotrimazole 1% topical cream, TOP, BID,? ?Not Taking, Completed Rx  Colace 100 mg oral capsule, 100 mg= 1 cap(s), Oral, BID, PRN  diclofenac sodium 75 mg oral delayed release tablet, 75 mg= 1 tab(s), Oral, BID,? ?Not Taking, Completed Rx  ergocalciferol 50,000 intl units (1.25 mg) oral capsule, 31650 IntUnit= 1 cap(s), Oral, qWeek  hydroCHLOROthiazide 12.5 mg oral capsule, 12.5 mg= 1 cap(s), Oral, Daily, 3 refills  Allergies  No Known Allergies  Social History  Abuse/Neglect  No, No, Yes, 04/14/2022  No, 04/11/2022  No, 04/06/2022  Alcohol - Denies Alcohol Use, 02/05/2012  Current, Wine, 1-2 times per year, 05/26/2018  Substance Use - Denies Substance Abuse, 02/05/2012  Never, 05/26/2018  Tobacco - Denies Tobacco Use, 02/05/2012  4 or less cigarettes(less than 1/4 pack)/day in last 30 days, N/A, 04/14/2022  4 or less cigarettes(less than 1/4 pack)/day in last 30 days, Cigarettes, Yes, 04/11/2022  4 or less cigarettes(less than 1/4 pack)/day in last 30 days, No, 04/06/2022  Family History  Diabetes mellitus type 1.: Mother, Father and Sister.  Heart disease: Father.  Hypertension.: Mother, Father and Sister.  Primary malignant neoplasm of bone: Brother.  Primary malignant neoplasm of prostate: Brother.  Stroke: Mother.  Immunizations  Vaccine Date Status   COVID-19 MRNA, LNP-S, PF- Pfizer 03/30/2021 Given   COVID-19 MRNA, LNP-S, PF- Pfizer 03/09/2021 Given

## 2022-05-23 ENCOUNTER — TELEPHONE (OUTPATIENT)
Dept: HEMATOLOGY/ONCOLOGY | Facility: CLINIC | Age: 62
End: 2022-05-23
Payer: MEDICAID

## 2022-05-23 ENCOUNTER — INFUSION (OUTPATIENT)
Dept: INFUSION THERAPY | Facility: HOSPITAL | Age: 62
End: 2022-05-23
Attending: SURGERY
Payer: MEDICAID

## 2022-05-23 ENCOUNTER — ANESTHESIA EVENT (OUTPATIENT)
Dept: SURGERY | Facility: HOSPITAL | Age: 62
End: 2022-05-23
Payer: MEDICAID

## 2022-05-23 ENCOUNTER — ANESTHESIA (OUTPATIENT)
Dept: SURGERY | Facility: HOSPITAL | Age: 62
End: 2022-05-23
Payer: MEDICAID

## 2022-05-23 VITALS
DIASTOLIC BLOOD PRESSURE: 82 MMHG | HEART RATE: 72 BPM | OXYGEN SATURATION: 98 % | BODY MASS INDEX: 21.46 KG/M2 | SYSTOLIC BLOOD PRESSURE: 138 MMHG | RESPIRATION RATE: 18 BRPM | WEIGHT: 125.69 LBS | HEIGHT: 64 IN

## 2022-05-23 PROCEDURE — 63600175 PHARM REV CODE 636 W HCPCS: Performed by: NURSE ANESTHETIST, CERTIFIED REGISTERED

## 2022-05-23 PROCEDURE — 25000003 PHARM REV CODE 250: Performed by: NURSE ANESTHETIST, CERTIFIED REGISTERED

## 2022-05-23 PROCEDURE — 96523 IRRIG DRUG DELIVERY DEVICE: CPT

## 2022-05-23 RX ORDER — DEXAMETHASONE SODIUM PHOSPHATE 4 MG/ML
INJECTION, SOLUTION INTRA-ARTICULAR; INTRALESIONAL; INTRAMUSCULAR; INTRAVENOUS; SOFT TISSUE
Status: DISCONTINUED | OUTPATIENT
Start: 2022-05-23 | End: 2022-05-23

## 2022-05-23 RX ORDER — LIDOCAINE HCL/PF 100 MG/5ML
SYRINGE (ML) INTRAVENOUS
Status: DISCONTINUED | OUTPATIENT
Start: 2022-05-23 | End: 2022-05-23

## 2022-05-23 RX ORDER — ONDANSETRON 2 MG/ML
INJECTION INTRAMUSCULAR; INTRAVENOUS
Status: DISCONTINUED | OUTPATIENT
Start: 2022-05-23 | End: 2022-05-23

## 2022-05-23 RX ORDER — FENTANYL CITRATE 50 UG/ML
INJECTION, SOLUTION INTRAMUSCULAR; INTRAVENOUS
Status: DISCONTINUED | OUTPATIENT
Start: 2022-05-23 | End: 2022-05-23

## 2022-05-23 RX ORDER — PHENYLEPHRINE HYDROCHLORIDE 10 MG/ML
INJECTION INTRAVENOUS
Status: DISCONTINUED | OUTPATIENT
Start: 2022-05-23 | End: 2022-05-23

## 2022-05-23 RX ORDER — PROPOFOL 10 MG/ML
INJECTION, EMULSION INTRAVENOUS
Status: DISCONTINUED | OUTPATIENT
Start: 2022-05-23 | End: 2022-05-23

## 2022-05-23 RX ORDER — CEFAZOLIN SODIUM 1 G/3ML
INJECTION, POWDER, FOR SOLUTION INTRAMUSCULAR; INTRAVENOUS
Status: DISCONTINUED | OUTPATIENT
Start: 2022-05-23 | End: 2022-05-23

## 2022-05-23 RX ADMIN — LIDOCAINE HYDROCHLORIDE 60 MG: 20 INJECTION, SOLUTION INTRAVENOUS at 07:05

## 2022-05-23 RX ADMIN — PHENYLEPHRINE HYDROCHLORIDE 100 MCG: 10 INJECTION INTRAVENOUS at 07:05

## 2022-05-23 RX ADMIN — FENTANYL CITRATE 25 MCG: 50 INJECTION INTRAMUSCULAR; INTRAVENOUS at 07:05

## 2022-05-23 RX ADMIN — ONDANSETRON 4 MG: 2 INJECTION INTRAMUSCULAR; INTRAVENOUS at 07:05

## 2022-05-23 RX ADMIN — FENTANYL CITRATE 50 MCG: 50 INJECTION INTRAMUSCULAR; INTRAVENOUS at 07:05

## 2022-05-23 RX ADMIN — DEXAMETHASONE SODIUM PHOSPHATE 8 MG: 4 INJECTION, SOLUTION INTRA-ARTICULAR; INTRALESIONAL; INTRAMUSCULAR; INTRAVENOUS; SOFT TISSUE at 07:05

## 2022-05-23 RX ADMIN — CEFAZOLIN 2 G: 330 INJECTION, POWDER, FOR SOLUTION INTRAMUSCULAR; INTRAVENOUS at 07:05

## 2022-05-23 RX ADMIN — PROPOFOL 180 MG: 10 INJECTION, EMULSION INTRAVENOUS at 07:05

## 2022-05-23 NOTE — ANESTHESIA PREPROCEDURE EVALUATION
05/23/2022  Cookie Ugalde is a 61 y.o., female.    Active Ambulatory Problems     Diagnosis Date Noted    Tobacco user 04/23/2022    Primary hypertension 05/01/2022    Malignant neoplasm of right breast in female, estrogen receptor negative 05/01/2022    Vitamin D deficiency 05/01/2022    HLD (hyperlipidemia) 05/02/2022     Resolved Ambulatory Problems     Diagnosis Date Noted    No Resolved Ambulatory Problems     No Additional Past Medical History     .lab    Pre-op Assessment    I have reviewed the Patient Summary Reports.     I have reviewed the Nursing Notes. I have reviewed the NPO Status.   I have reviewed the Medications.     Review of Systems  Anesthesia Hx:  No previous Anesthesia  Neg history of prior surgery. Denies Family Hx of Anesthesia complications.   Denies Personal Hx of Anesthesia complications.   Social:  Non-Smoker    Hematology/Oncology:  Hematology Normal   Oncology Normal     EENT/Dental:EENT/Dental Normal   Cardiovascular:   Exercise tolerance: good Hypertension    Pulmonary:  Pulmonary Normal    Renal/:  Renal/ Normal     Hepatic/GI:  Hepatic/GI Normal    Neurological:  Neurology Normal    Endocrine:  Endocrine Normal    Dermatological:  Skin Normal    Psych:  Psychiatric Normal           Physical Exam  General: Well nourished, Cooperative, Alert and Oriented    Airway:  Mallampati: II / I/ II  Mouth Opening: Normal  TM Distance: Normal  Tongue: Large  Neck ROM: Normal ROM    Dental:  Intact    Abdomen:  Normal, Soft        Anesthesia Plan  Type of Anesthesia, risks & benefits discussed:    Anesthesia Type: Gen Supraglottic Airway  Intra-op Monitoring Plan: Standard ASA Monitors  Post Op Pain Control Plan: IV/PO Opioids PRN  Induction:  IV  Airway Plan: Direct  Informed Consent: Informed consent signed with the Patient representative and all parties understand the  risks and agree with anesthesia plan.  All questions answered. Patient consented to blood products? No  ASA Score: 3  Day of Surgery Review of History & Physical: H&P Update referred to the surgeon/provider.I have interviewed and examined the patient. I have reviewed the patient's H&P dated: There are no significant changes. H&P completed by Anesthesiologist.    Ready For Surgery From Anesthesia Perspective.     .

## 2022-05-23 NOTE — TRANSFER OF CARE
Anesthesia Transfer of Care Note    Patient: Cookie Ugalde    Procedure(s) Performed: Procedure(s) (LRB):  Placement, Mediport (Left)    Patient location: PACU    Anesthesia Type: general    Transport from OR: Transported from OR on room air with adequate spontaneous ventilation    Post pain: adequate analgesia    Post assessment: no apparent anesthetic complications    Post vital signs: stable    Level of consciousness: sedated    Nausea/Vomiting: no nausea/vomiting    Complications: none    Transfer of care protocol was followed      Last vitals:   See PACU RN documentation

## 2022-05-23 NOTE — ANESTHESIA PROCEDURE NOTES
Intubation    Date/Time: 5/23/2022 7:05 AM  Performed by: Odalys Garza CRNA  Authorized by: Viky Su MD     Intubation:     Induction:  Intravenous    Intubated:  Postinduction    Mask Ventilation:  Easy mask    Attempts:  1    Attempted By:  CRNA    Difficult Airway Encountered?: No      Complications:  None    Airway Device:  Supraglottic airway/LMA    Airway Device Size:  3.0    Style/Cuff Inflation:  Uncuffed    Placement Verified By:  Capnometry    Complicating Factors:  None    Findings Post-Intubation:  BS equal bilateral and atraumatic/condition of teeth unchanged

## 2022-05-23 NOTE — ANESTHESIA POSTPROCEDURE EVALUATION
Anesthesia Post Evaluation    Patient: Cookie Ugalde    Procedure(s) Performed: Procedure(s) (LRB):  Placement, Mediport (Left)    Final Anesthesia Type: general      Patient location during evaluation: PACU  Patient participation: Yes- Able to Participate  Level of consciousness: awake and responds to stimulation  Post-procedure vital signs: reviewed and stable  Pain management: adequate  Airway patency: patent    PONV status at discharge: No PONV  Anesthetic complications: no      Cardiovascular status: blood pressure returned to baseline  Respiratory status: unassisted  Hydration status: euvolemic  Follow-up not needed.      Vitals:    05/23/22 0855   BP: (!) 143/98   Pulse: 73   Resp: 18   Temp: 36.6 °C (97.9 °F)       Vitals:    05/23/22 0607   BP: 125/72   Pulse: 78   Resp: 20   Temp: 36.2 °C (97.2 °F)       Vitals    Taken Time   BP  05/23/22 0841   Temp  05/23/22 0841   Pulse  05/23/22 0841   Resp  05/23/22 0841   SpO2  05/23/22 0841         No case tracking events are documented in the log.      Pain/Timoteo Score: No data recorded

## 2022-05-30 ENCOUNTER — INFUSION (OUTPATIENT)
Dept: INFUSION THERAPY | Facility: HOSPITAL | Age: 62
End: 2022-05-30
Attending: INTERNAL MEDICINE
Payer: MEDICAID

## 2022-05-30 VITALS
SYSTOLIC BLOOD PRESSURE: 141 MMHG | RESPIRATION RATE: 18 BRPM | DIASTOLIC BLOOD PRESSURE: 86 MMHG | TEMPERATURE: 98 F | HEART RATE: 80 BPM | OXYGEN SATURATION: 99 % | WEIGHT: 127.19 LBS | BODY MASS INDEX: 21.83 KG/M2

## 2022-05-30 DIAGNOSIS — Z17.1 MALIGNANT NEOPLASM OF AREOLA OF RIGHT BREAST IN FEMALE, ESTROGEN RECEPTOR NEGATIVE: Primary | ICD-10-CM

## 2022-05-30 DIAGNOSIS — C50.011 MALIGNANT NEOPLASM OF AREOLA OF RIGHT BREAST IN FEMALE, ESTROGEN RECEPTOR NEGATIVE: Primary | ICD-10-CM

## 2022-05-30 PROCEDURE — 96375 TX/PRO/DX INJ NEW DRUG ADDON: CPT

## 2022-05-30 PROCEDURE — 96367 TX/PROPH/DG ADDL SEQ IV INF: CPT

## 2022-05-30 PROCEDURE — 96413 CHEMO IV INFUSION 1 HR: CPT

## 2022-05-30 PROCEDURE — A4216 STERILE WATER/SALINE, 10 ML: HCPCS | Performed by: INTERNAL MEDICINE

## 2022-05-30 PROCEDURE — 25000003 PHARM REV CODE 250: Performed by: INTERNAL MEDICINE

## 2022-05-30 PROCEDURE — 63600175 PHARM REV CODE 636 W HCPCS: Performed by: INTERNAL MEDICINE

## 2022-05-30 PROCEDURE — 63600175 PHARM REV CODE 636 W HCPCS: Performed by: NURSE PRACTITIONER

## 2022-05-30 PROCEDURE — 96411 CHEMO IV PUSH ADDL DRUG: CPT

## 2022-05-30 RX ORDER — DOXORUBICIN HYDROCHLORIDE 2 MG/ML
60 INJECTION, SOLUTION INTRAVENOUS
Status: COMPLETED | OUTPATIENT
Start: 2022-05-30 | End: 2022-05-30

## 2022-05-30 RX ORDER — DIPHENHYDRAMINE HYDROCHLORIDE 50 MG/ML
25 INJECTION INTRAMUSCULAR; INTRAVENOUS
Status: COMPLETED | OUTPATIENT
Start: 2022-05-30 | End: 2022-05-30

## 2022-05-30 RX ORDER — SODIUM CHLORIDE 0.9 % (FLUSH) 0.9 %
10 SYRINGE (ML) INJECTION
Status: DISCONTINUED | OUTPATIENT
Start: 2022-05-30 | End: 2022-05-30 | Stop reason: HOSPADM

## 2022-05-30 RX ORDER — HEPARIN SODIUM (PORCINE) LOCK FLUSH IV SOLN 100 UNIT/ML 100 UNIT/ML
100 SOLUTION INTRAVENOUS
Status: DISCONTINUED | OUTPATIENT
Start: 2022-05-30 | End: 2022-05-30 | Stop reason: HOSPADM

## 2022-05-30 RX ADMIN — SODIUM CHLORIDE: 9 INJECTION, SOLUTION INTRAVENOUS at 09:05

## 2022-05-30 RX ADMIN — CYCLOPHOSPHAMIDE 960 MG: 1 INJECTION INTRAVENOUS at 10:05

## 2022-05-30 RX ADMIN — DOXORUBICIN HYDROCHLORIDE 96 MG: 2 INJECTION, SOLUTION INTRAVENOUS at 10:05

## 2022-05-30 RX ADMIN — SODIUM CHLORIDE, PRESERVATIVE FREE 10 ML: 5 INJECTION INTRAVENOUS at 11:05

## 2022-05-30 RX ADMIN — APREPITANT 130 MG: 130 INJECTION, EMULSION INTRAVENOUS at 09:05

## 2022-05-30 RX ADMIN — DIPHENHYDRAMINE HYDROCHLORIDE 25 MG: 50 INJECTION, SOLUTION INTRAMUSCULAR; INTRAVENOUS at 09:05

## 2022-05-30 RX ADMIN — DEXAMETHASONE SODIUM PHOSPHATE 0.25 MG: 4 INJECTION, SOLUTION INTRA-ARTICULAR; INTRALESIONAL; INTRAMUSCULAR; INTRAVENOUS; SOFT TISSUE at 09:05

## 2022-05-30 RX ADMIN — HEPARIN SODIUM (PORCINE) LOCK FLUSH IV SOLN 100 UNIT/ML 100 UNITS: 100 SOLUTION at 11:05

## 2022-05-31 ENCOUNTER — INFUSION (OUTPATIENT)
Dept: INFUSION THERAPY | Facility: HOSPITAL | Age: 62
End: 2022-05-31
Attending: INTERNAL MEDICINE
Payer: MEDICAID

## 2022-05-31 VITALS
DIASTOLIC BLOOD PRESSURE: 83 MMHG | SYSTOLIC BLOOD PRESSURE: 117 MMHG | RESPIRATION RATE: 20 BRPM | TEMPERATURE: 98 F | HEART RATE: 73 BPM

## 2022-05-31 DIAGNOSIS — Z17.1 MALIGNANT NEOPLASM OF AREOLA OF RIGHT BREAST IN FEMALE, ESTROGEN RECEPTOR NEGATIVE: Primary | ICD-10-CM

## 2022-05-31 DIAGNOSIS — C50.011 MALIGNANT NEOPLASM OF AREOLA OF RIGHT BREAST IN FEMALE, ESTROGEN RECEPTOR NEGATIVE: Primary | ICD-10-CM

## 2022-05-31 PROCEDURE — 96372 THER/PROPH/DIAG INJ SC/IM: CPT

## 2022-05-31 PROCEDURE — 63600175 PHARM REV CODE 636 W HCPCS: Mod: TB | Performed by: INTERNAL MEDICINE

## 2022-05-31 RX ADMIN — PEGFILGRASTIM-CBQV 6 MG: 6 INJECTION, SOLUTION SUBCUTANEOUS at 02:05

## 2022-06-05 DIAGNOSIS — C50.919 CARCINOMA OF FEMALE BREAST, UNSPECIFIED ESTROGEN RECEPTOR STATUS, UNSPECIFIED LATERALITY, UNSPECIFIED SITE OF BREAST: Primary | ICD-10-CM

## 2022-06-06 ENCOUNTER — OFFICE VISIT (OUTPATIENT)
Dept: HEMATOLOGY/ONCOLOGY | Facility: CLINIC | Age: 62
End: 2022-06-06
Payer: MEDICAID

## 2022-06-06 VITALS
DIASTOLIC BLOOD PRESSURE: 82 MMHG | HEART RATE: 90 BPM | TEMPERATURE: 98 F | WEIGHT: 126.38 LBS | SYSTOLIC BLOOD PRESSURE: 123 MMHG | BODY MASS INDEX: 21.57 KG/M2 | RESPIRATION RATE: 18 BRPM | HEIGHT: 64 IN

## 2022-06-06 DIAGNOSIS — C50.919 TRIPLE NEGATIVE MALIGNANT NEOPLASM OF BREAST: Primary | ICD-10-CM

## 2022-06-06 DIAGNOSIS — Z51.11 ENCOUNTER FOR CHEMOTHERAPY MANAGEMENT: ICD-10-CM

## 2022-06-06 PROBLEM — Z17.421 TRIPLE NEGATIVE MALIGNANT NEOPLASM OF BREAST: Status: ACTIVE | Noted: 2022-06-06

## 2022-06-06 PROCEDURE — 99214 OFFICE O/P EST MOD 30 MIN: CPT | Mod: S$PBB,,, | Performed by: NURSE PRACTITIONER

## 2022-06-06 PROCEDURE — 83735 ASSAY OF MAGNESIUM: CPT | Performed by: NURSE PRACTITIONER

## 2022-06-06 PROCEDURE — 99213 OFFICE O/P EST LOW 20 MIN: CPT | Mod: PBBFAC | Performed by: NURSE PRACTITIONER

## 2022-06-06 PROCEDURE — 3079F DIAST BP 80-89 MM HG: CPT | Mod: CPTII,,, | Performed by: NURSE PRACTITIONER

## 2022-06-06 PROCEDURE — 99214 PR OFFICE/OUTPT VISIT, EST, LEVL IV, 30-39 MIN: ICD-10-PCS | Mod: S$PBB,,, | Performed by: NURSE PRACTITIONER

## 2022-06-06 PROCEDURE — 80053 COMPREHEN METABOLIC PANEL: CPT | Performed by: NURSE PRACTITIONER

## 2022-06-06 PROCEDURE — 3008F PR BODY MASS INDEX (BMI) DOCUMENTED: ICD-10-PCS | Mod: CPTII,,, | Performed by: NURSE PRACTITIONER

## 2022-06-06 PROCEDURE — 3074F SYST BP LT 130 MM HG: CPT | Mod: CPTII,,, | Performed by: NURSE PRACTITIONER

## 2022-06-06 PROCEDURE — 3079F PR MOST RECENT DIASTOLIC BLOOD PRESSURE 80-89 MM HG: ICD-10-PCS | Mod: CPTII,,, | Performed by: NURSE PRACTITIONER

## 2022-06-06 PROCEDURE — 3074F PR MOST RECENT SYSTOLIC BLOOD PRESSURE < 130 MM HG: ICD-10-PCS | Mod: CPTII,,, | Performed by: NURSE PRACTITIONER

## 2022-06-06 PROCEDURE — 3008F BODY MASS INDEX DOCD: CPT | Mod: CPTII,,, | Performed by: NURSE PRACTITIONER

## 2022-06-06 PROCEDURE — 85025 COMPLETE CBC W/AUTO DIFF WBC: CPT | Performed by: NURSE PRACTITIONER

## 2022-06-06 RX ORDER — DIPHENHYDRAMINE HYDROCHLORIDE 50 MG/ML
25 INJECTION INTRAMUSCULAR; INTRAVENOUS
Status: CANCELLED
Start: 2022-06-13

## 2022-06-06 RX ORDER — DOXORUBICIN HYDROCHLORIDE 2 MG/ML
60 INJECTION, SOLUTION INTRAVENOUS
Status: CANCELLED | OUTPATIENT
Start: 2022-06-13

## 2022-06-06 RX ORDER — SODIUM CHLORIDE 0.9 % (FLUSH) 0.9 %
10 SYRINGE (ML) INJECTION
Status: CANCELLED | OUTPATIENT
Start: 2022-06-06

## 2022-06-06 RX ORDER — HEPARIN 100 UNIT/ML
500 SYRINGE INTRAVENOUS
Status: CANCELLED | OUTPATIENT
Start: 2022-06-06

## 2022-06-06 RX ORDER — SODIUM CHLORIDE 0.9 % (FLUSH) 0.9 %
10 SYRINGE (ML) INJECTION
Status: CANCELLED | OUTPATIENT
Start: 2022-06-13

## 2022-06-06 RX ORDER — HEPARIN 100 UNIT/ML
500 SYRINGE INTRAVENOUS
Status: CANCELLED | OUTPATIENT
Start: 2022-06-13

## 2022-06-06 NOTE — Clinical Note
Pls add labs to be done prior to treatment on 6/13/22 Labs, visit, treatment in 3 weeks with Neulasta the following day

## 2022-06-06 NOTE — PROGRESS NOTES
Problem List:   IDC Right Breast, Triple Negative; follow up     Past medical history: Tobacco abuse. Hypertension. Arthritis of right shoulder.    Procedure/surgical history:  Umbilical hernia repair .  Social history: . Lives in [place], [place]. Has 4 children. Works as a  in a [employer]. Has been smoking 5 cigarettes daily for 15 years. Drinks half a pint of liquor over the weekend. No illicit drugs  Menstrual and OB/GYN history: Menarche, age 14. Menopause, late 40s. First child at age 26 years. No abortions or miscarriages. Used birth control pills for 8 years. Did not use hormone replacement therapy after menopause. Breast-fed 1 child for 7 months.    Family history:  2 brothers experienced prostate cancer in their 50s  Father  from prostate cancer in his 60s-70s  No family history of breast cancer.    Current Treatment:  DDAC x q2 wks x 4 cycles; C1D1 on 22    Treatment History:  2022: Right mastectomy, right SLN biopsy    History of Present Illness:  61 -year-old female referred from surgery clinic, with newly diagnosed right breast cancer.  She never palpated breast mass. She noticed breast/chest wall pain after extensive muscle use in 2021. At that time, she was diagnosed with musculoskeletal pain. In January, she went back to emergency department when symptoms did not resolve. At that time, physical examination apparently noted irregularity in breast and she was referred for further imaging and biopsy revealing for triple negative breast cancer.     Interval history: Seen in follow up today, 22 for tox assessment; feeling well. Denies any problems or concerns following her first treatment last week of DDAC. Labs stable.     ROS: Negative as otherwise stated in HPI    Physical Exam:  Vitals:    22 0956   BP: 123/82   Pulse: 90   Resp: 18   Temp: 98.2 °F (36.8 °C)     Constitutional: No fever, chills, weight loss, weakness or  fatigue  Eye: No visual disturbances or changes in vision, no double vision  ENMT: no decreased hearing, nasal congestion, nosebleeds, sore throat, taste disturbance, tinnitus, vertigo  Respiratory: No shortness of breath, cough, sputum production, hemoptysis or pleuritic type chest pain  Breast: s/p Right simple mastectomy and SLN biopsy; site healing well  Cardiovascular: No chest pain, palpitations, syncope, leg swelling  Gastrointestinal: No nausea, vomiting, diarrhea, constipation, heartburn, abdominal pain  Genitourinary: No CVA tenderness  Hematology/lymph: no abnormal bruising, hemorrhage, petechiae swollen lymph glands  Musculoskeletal: No back or neck pain, joint pain, muscle pain or decreased range of motion  Integumentary: No rash, pruritus, skin lesion or any other significant skin complaints; L chest port   Neurologic: alert and oriented x 4, no abnormal balance, confusion, numbness, tingling, headache  Psychiatric: No anxiety, depression, suicidal or homicidal ideations    Lab Results   Component Value Date    WBC 3.8 (L) 06/06/2022    RBC 3.97 (L) 06/06/2022    HGB 12.0 06/06/2022    HCT 36.7 (L) 06/06/2022    MCV 92.4 06/06/2022    MCH 30.2 06/06/2022    MCHC 32.7 (L) 06/06/2022    RDW 12.0 06/06/2022     06/06/2022    MPV 10.1 06/06/2022     CMP  Sodium Level   Date Value Ref Range Status   06/06/2022 138 136 - 145 mmol/L Final     Potassium Level   Date Value Ref Range Status   06/06/2022 4.9 3.5 - 5.1 mmol/L Final     Carbon Dioxide   Date Value Ref Range Status   06/06/2022 31 (H) 22 - 29 mmol/L Final     Blood Urea Nitrogen   Date Value Ref Range Status   06/06/2022 16.3 9.8 - 20.1 mg/dL Final     Creatinine   Date Value Ref Range Status   06/06/2022 0.77 0.55 - 1.02 mg/dL Final     Calcium Level Total   Date Value Ref Range Status   06/06/2022 9.6 8.4 - 10.2 mg/dL Final     Albumin Level   Date Value Ref Range Status   06/06/2022 3.9 3.5 - 5.0 gm/dL Final     Bilirubin Total   Date  Value Ref Range Status   06/06/2022 0.5 <=1.5 mg/dL Final     Alkaline Phosphatase   Date Value Ref Range Status   06/06/2022 103 40 - 150 unit/L Final     Aspartate Aminotransferase   Date Value Ref Range Status   06/06/2022 16 5 - 34 unit/L Final     Alanine Aminotransferase   Date Value Ref Range Status   06/06/2022 17 0 - 55 unit/L Final     Estimated GFR-Non    Date Value Ref Range Status   04/28/2022 85 >=90      Assessment:  IDC right breast, triple negative:  -03/02/2022: Bilateral diagnostic mammogram (chest wall pain)  -03/08/2022: Needle core biopsy  -04/11/2022: Right simple mastectomy and SLN biopsy  -2.2 cm, grade 3, no LVI, margins negative, 4 lymph nodes negative (including 3 negative SLN's)  >>   pT2 pN0 MX, G3, triple negative, AJCC anatomic stage IIA, pathological prognostic stage IIA      Plan:  -Assuming postmenopausal (last menstrual cycle in her late 40s)  -Serum FSH and estradiol level checked because age <60; results indicative of menopause     -Needs genetic testing because of triple negative breast cancer; also, family history of prostate cancer    -Has no signs and symptoms of metastatic disease, no indication of additional imaging studies    -pT2 pN0, status postmastectomy  -Does not need adjuvant radiotherapy    -Starting adjuvant treatment with DD AC every 2 weeks x 4 cycles, followed by adjuvant Taxol  -LVEF 55%-5/5/2022  -Mediport placed on 5/19/2022    Proposed adjuvant chemotherapy:  1. Doxorubicin 60 mg/m² IV day 1;  2. Cyclophosphamide 600 mg/m² IV day 1;  Cycled every 14 days for 4 cycles; followed by,  3. Paclitaxel 175 mg/m² by 3 hour IV infusion on day 1, cycled every 14 days for 4 cycles; or, paclitaxel 80 mg/m² IV 1 hour infusion weekly for 12 weeks.  All dose-dense cycles are with myeloid growth factor support.    -Consider adjuvant bisphosphonate therapy for risk reduction of distant metastasis for 3-5 years in postmenopausal patients (natural or induced)  with high risk node-negative or node positive tumors  >>>   -Will offer adjuvant bisphosphonate therapy to this patient    -Addition of 1 year of adjuvant olaparib is an option in this patient with pT2 triple negative tumor if germline testing returns positive for BRCA1/2 mutation    -She is s/p cycle 1 adjuvant DDAC on 5/30/22; seemingly tolerated well. Reviewed / discussed labs today; stable  -Return for labs, treatment on 6/13/22; return for Neulasta on 6/14/22  -RTC for labs, visit in 3 weeks (C2 DDAC)

## 2022-06-10 RX ORDER — SODIUM CHLORIDE 0.9 % (FLUSH) 0.9 %
10 SYRINGE (ML) INJECTION
Status: CANCELLED | OUTPATIENT
Start: 2022-06-27

## 2022-06-10 RX ORDER — DIPHENHYDRAMINE HYDROCHLORIDE 50 MG/ML
25 INJECTION INTRAMUSCULAR; INTRAVENOUS
Status: CANCELLED
Start: 2022-06-27

## 2022-06-10 RX ORDER — HEPARIN 100 UNIT/ML
500 SYRINGE INTRAVENOUS
Status: CANCELLED | OUTPATIENT
Start: 2022-06-27

## 2022-06-10 RX ORDER — DOXORUBICIN HYDROCHLORIDE 2 MG/ML
60 INJECTION, SOLUTION INTRAVENOUS
Status: CANCELLED | OUTPATIENT
Start: 2022-06-27

## 2022-06-13 ENCOUNTER — INFUSION (OUTPATIENT)
Dept: INFUSION THERAPY | Facility: HOSPITAL | Age: 62
End: 2022-06-13
Attending: INTERNAL MEDICINE
Payer: MEDICAID

## 2022-06-13 VITALS
TEMPERATURE: 99 F | HEART RATE: 79 BPM | WEIGHT: 130.31 LBS | BODY MASS INDEX: 22.36 KG/M2 | SYSTOLIC BLOOD PRESSURE: 136 MMHG | DIASTOLIC BLOOD PRESSURE: 90 MMHG | RESPIRATION RATE: 20 BRPM

## 2022-06-13 DIAGNOSIS — C50.011 MALIGNANT NEOPLASM OF AREOLA OF RIGHT BREAST IN FEMALE, ESTROGEN RECEPTOR NEGATIVE: Primary | ICD-10-CM

## 2022-06-13 DIAGNOSIS — Z17.1 MALIGNANT NEOPLASM OF AREOLA OF RIGHT BREAST IN FEMALE, ESTROGEN RECEPTOR NEGATIVE: Primary | ICD-10-CM

## 2022-06-13 PROCEDURE — 63600175 PHARM REV CODE 636 W HCPCS: Performed by: NURSE PRACTITIONER

## 2022-06-13 PROCEDURE — 25000003 PHARM REV CODE 250: Performed by: NURSE PRACTITIONER

## 2022-06-13 RX ORDER — SODIUM CHLORIDE 0.9 % (FLUSH) 0.9 %
10 SYRINGE (ML) INJECTION
Status: DISCONTINUED | OUTPATIENT
Start: 2022-06-13 | End: 2022-08-01

## 2022-06-13 RX ORDER — HEPARIN 100 UNIT/ML
500 SYRINGE INTRAVENOUS
Status: DISCONTINUED | OUTPATIENT
Start: 2022-06-13 | End: 2022-08-01

## 2022-06-13 RX ORDER — DIPHENHYDRAMINE HYDROCHLORIDE 50 MG/ML
25 INJECTION INTRAMUSCULAR; INTRAVENOUS
Status: COMPLETED | OUTPATIENT
Start: 2022-06-13 | End: 2022-06-13

## 2022-06-13 RX ORDER — DOXORUBICIN HYDROCHLORIDE 2 MG/ML
60 INJECTION, SOLUTION INTRAVENOUS
Status: COMPLETED | OUTPATIENT
Start: 2022-06-13 | End: 2022-06-13

## 2022-06-13 RX ADMIN — CYCLOPHOSPHAMIDE 960 MG: 1 INJECTION INTRAVENOUS at 10:06

## 2022-06-13 RX ADMIN — DOXORUBICIN HYDROCHLORIDE 96 MG: 2 INJECTION, SOLUTION INTRAVENOUS at 09:06

## 2022-06-13 RX ADMIN — DIPHENHYDRAMINE HYDROCHLORIDE 25 MG: 50 INJECTION, SOLUTION INTRAMUSCULAR; INTRAVENOUS at 09:06

## 2022-06-13 RX ADMIN — APREPITANT 130 MG: 130 INJECTION, EMULSION INTRAVENOUS at 09:06

## 2022-06-13 RX ADMIN — DEXAMETHASONE SODIUM PHOSPHATE 0.25 MG: 4 INJECTION, SOLUTION INTRA-ARTICULAR; INTRALESIONAL; INTRAMUSCULAR; INTRAVENOUS; SOFT TISSUE at 09:06

## 2022-06-14 ENCOUNTER — INFUSION (OUTPATIENT)
Dept: INFUSION THERAPY | Facility: HOSPITAL | Age: 62
End: 2022-06-14
Attending: INTERNAL MEDICINE
Payer: MEDICAID

## 2022-06-14 VITALS
HEART RATE: 96 BPM | SYSTOLIC BLOOD PRESSURE: 124 MMHG | DIASTOLIC BLOOD PRESSURE: 86 MMHG | RESPIRATION RATE: 20 BRPM | TEMPERATURE: 98 F

## 2022-06-14 DIAGNOSIS — C50.011 MALIGNANT NEOPLASM OF AREOLA OF RIGHT BREAST IN FEMALE, ESTROGEN RECEPTOR NEGATIVE: Primary | ICD-10-CM

## 2022-06-14 DIAGNOSIS — Z17.1 MALIGNANT NEOPLASM OF AREOLA OF RIGHT BREAST IN FEMALE, ESTROGEN RECEPTOR NEGATIVE: Primary | ICD-10-CM

## 2022-06-14 PROCEDURE — 63600175 PHARM REV CODE 636 W HCPCS: Mod: TB | Performed by: NURSE PRACTITIONER

## 2022-06-14 PROCEDURE — 96372 THER/PROPH/DIAG INJ SC/IM: CPT

## 2022-06-14 RX ADMIN — PEGFILGRASTIM-CBQV 6 MG: 6 INJECTION, SOLUTION SUBCUTANEOUS at 12:06

## 2022-06-22 ENCOUNTER — OFFICE VISIT (OUTPATIENT)
Dept: FAMILY MEDICINE | Facility: CLINIC | Age: 62
End: 2022-06-22
Payer: MEDICAID

## 2022-06-22 VITALS
OXYGEN SATURATION: 99 % | WEIGHT: 127.63 LBS | DIASTOLIC BLOOD PRESSURE: 87 MMHG | SYSTOLIC BLOOD PRESSURE: 133 MMHG | HEART RATE: 87 BPM | BODY MASS INDEX: 21.91 KG/M2 | RESPIRATION RATE: 18 BRPM | TEMPERATURE: 99 F

## 2022-06-22 DIAGNOSIS — K59.00 CONSTIPATION, UNSPECIFIED CONSTIPATION TYPE: ICD-10-CM

## 2022-06-22 DIAGNOSIS — C50.919 TRIPLE NEGATIVE MALIGNANT NEOPLASM OF BREAST: ICD-10-CM

## 2022-06-22 DIAGNOSIS — I10 PRIMARY HYPERTENSION: Primary | ICD-10-CM

## 2022-06-22 PROCEDURE — 99213 OFFICE O/P EST LOW 20 MIN: CPT | Mod: PBBFAC

## 2022-06-22 NOTE — PROGRESS NOTES
I have seen the patient, reviewed the resident's history and physical, assessment, plan, and progress note. I have personally interviewed and examined the patient at bedside and: agree with the findings.     Bry Hilliard MD  [facility]

## 2022-06-22 NOTE — PROGRESS NOTES
[facility] Office Visit Note    CC:   Hypertension       HPI:  Cookie Ugalde is a 61 y.o. female who presents for routine follow up.    Current Visit:     Acute:   Reports she has been having some constipation since starting chemo   Has bowel movement every 4-5 days, regular   Starting using laxative tea and prune juice w/ good relief   No n/v     Chronic:     HTN:   Compliant w/ amlodipine and HCTZ   Deneis any HA, chest pain, palpitations   Not checking BP at home     Breast cancer:   Follow w/ [facility] ONC  On chemo currently     Review of Systems:   Review of Systems   Constitutional: Negative for fever.   Respiratory: Negative for shortness of breath.    Cardiovascular: Negative for chest pain and leg swelling.   Gastrointestinal: Negative for nausea and vomiting.        Current Outpatient Medications   Medication Instructions    amLODIPine (NORVASC) 10 mg, Oral, Daily    docusate sodium (COLACE) 100 mg, Oral    ergocalciferol (ERGOCALCIFEROL) 50,000 unit Cap 50,000 Int'l Units, Oral    hydroCHLOROthiazide (MICROZIDE) 12.5 mg, Oral, Daily    HYDROcodone-acetaminophen (NORCO) 5-325 mg per tablet TAKE 1 TABLET BY MOUTH EVERY 4 HOURS FOR 1 WEEK AS NEEDED FOR PAIN    methocarbamoL (ROBAXIN) 750 MG Tab TAKE 2 TABLETS BY MOUTH THREE TIMES A DAY FOR 7 DAYS    ondansetron (ZOFRAN-ODT) 8 mg, Oral, Every 8 hours PRN    pravastatin (PRAVACHOL) 40 mg, Oral, Daily        Physical Exam:   Vitals:    06/22/22 0856   BP: 133/87   Pulse: 87   Resp: 18   Temp: 99.3 °F (37.4 °C)      Physical Exam   Constitutional: She is oriented to person, place, and time.  Non-toxic appearance. No distress.   HENT:   Head: Normocephalic and atraumatic.   Eyes: Conjunctivae are normal.   Cardiovascular: Normal rate and regular rhythm. Exam reveals no gallop.   No murmur heard.  Right mastectomy, well healed    Pulmonary/Chest: Effort normal and breath sounds normal. No respiratory distress. She has no wheezes. She has no rales.    Abdominal: Soft. Normal appearance and bowel sounds are normal. She exhibits no distension and no mass. There is no abdominal tenderness. There is no guarding.   Musculoskeletal:      Right lower leg: No edema.      Left lower leg: No edema.   Neurological: She is alert and oriented to person, place, and time.   Skin: Skin is warm and dry.        Assessment/Plan:  1. Constipation, unspecified constipation type  - Maintain adequate hydration   - Betsy LAX 1/2 cup to full cup daily or every other day   - Titrate to have soft BM q2-3 days   - Continue to monitor     2. Primary hypertension  - Well controlled, continue current regimen   - Keep home BP log for next visit     3. Triple negative malignant neoplasm of breast  - Keep f/u w/ [facility]C   -  On chemo, continue to monitor       Return to clinic in 3 months     Lauro Muller MD [facility]

## 2022-06-25 PROBLEM — Z80.42 FAMILY HISTORY OF MALIGNANT NEOPLASM OF PROSTATE: Status: ACTIVE | Noted: 2022-06-25

## 2022-06-25 NOTE — PROGRESS NOTES
Past medical history: Tobacco abuse.  Hypertension.  Arthritis of right shoulder.  Procedure/surgical history:   Umbilical hernia repair .  Social history: .  Lives in [place], [place].  Has 4 children.  Works as a  in a [employer].  Has been smoking 5 cigarettes daily for 15 years.  Drinks half a pint of liquor over the weekend.  No illicit drugs.  Family history:   2 brothers experienced prostate cancer in their 50s   Father  from prostate cancer in his 60s-70s  No family history of breast cancer.  Menstrual and OB/GYN history: Menarche, age 14.  Menopause, late 40s.  First child at age 26 years.  No abortions or miscarriages.  Used birth control pills for 8 years.  Did not use hormone replacement therapy after menopause.  Breast-fed 1 child for 7 months.      Reason for follow-up:  -IDC right breast, triple negative, S/P right mastectomy 2022, pT2 pN0 MX, G3, AJCC anatomic stage IIA, pathological prognostic stage IIA  -family history of prostate cancer      History of present illness:   61-year-old female referred from surgery clinic, with newly diagnosed right breast cancer.     She never palpated breast mass.  She noticed breast/chest wall pain after extensive muscle use in 2021.  At that time, she was diagnosed with musculoskeletal pain.  In January, she went back to emergency department when symptoms did not resolve.  At that time, physical examination apparently noted irregularity in breast and she was referred for further imaging and biopsy.     2022: Bilateral diagnostic mammogram with tomosynthesis, limited ultrasound right breast (indication: Right breast lump, upper outer quadrant) (comparison: 2018):   -Right breast: Suspicious abnormality (BI-RADS 4)   -Left breast: Negative (BI-RADS 1)     2022:   1.  Right axillary lymph node with 3 mm cortex at 10:00, 10 cm from nipple, ultrasound-guided needle core biopsy: No evidence of  carcinoma;   2.  Right upper outer breast 2.6 x 1.5 x 2.7 cm mass at 10:00, 7 cm from nipple, ultrasound-guided needle core biopsy: Invasive ductal carcinoma; overall grade 3     ER negative (0%).  TN negative (0%).  HER2 negative (score 0).  Ki-67 high proliferation (90%).     03/18/2022: Bilateral diagnostic mammogram with contrast: Limited ultrasound right breast:   -Right breast: Known biopsy-proven malignancy (BI-RADS 5)   -Left breast: Negative (BI-RADS 1)     04/11/2022: Right mastectomy, right SLN biopsy     04/11/2022:   1.  Right breast, simple mastectomy: Invasive ductal carcinoma; overall grade 3; 2.2 cm; no DCIS; no LVI; all margins negative (closest margin, 5 mm; all regional lymph nodes negative for tumor; number of lymph nodes examined, 4; number of SLN's examined, 3; number of lymph nodes with metastasis, 0   >>pT2 pN0      04/28/2022:   Presents for initial medical oncology consultation, accompanied by her sister and daughters.  Very pleasant lady.  Appears healthy.  In no acute discomfort.   -Apart from postoperative right chest wall and right axillary pain, no other symptoms.  No weakness, fatigue, malaise, fevers, chills, unusual headaches, focal neurological symptoms, chest pain, cough, dyspnea, hemoptysis, abdominal pain, nausea, vomiting, anorexia, unintentional weight loss, postmenopausal spotting, bone pains, any urinary problems, etc.  ECOG 0.      Interval history    06/27/2022:  -04/28/2022: FSH level 51.03 mIU/mL (postmenopausal); estradiol level <24 pg/mL  -05/06/2022:  TTE:  LVEF 55%  -05/23/2022:  Left subclavian MediPort placed  -cycle 1 adjuvant ddAC 05/30/2022  -cycle 2 adjuvant ddAC 06/13/2022  -06/27/2022:  Labs reviewed; WBC 18.9.  Hemoglobin 11.7.  Platelets 276 K. CMP unremarkable.  Differential count is pending.    Presents for a follow-up visit.  Doing great.  She are full and full of energy.  Very little side effects with chemo.  Does feel bone pains and generalized  soreness for 1 or 2 days after Neulasta shot; then, bounces back.  ECOG 0. Good appetite.  No nausea vomiting.  No chest pain, cough, or dyspnea.  No fevers or chills.  No unusual headaches or focal neurological symptoms.        Review of systems:   All systems reviewed, and found to be negative except for the symptoms detailed above.      Physical examination:   VITAL SIGNS:  Reviewed.      GENERAL:  In no apparent distress.    HEAD:  No signs of head trauma.   EYES:  Pupils are equal.  Extraocular motions intact.    EARS:  Hearing grossly intact.   MOUTH:  Oropharynx is normal.   NECK:  No adenopathy, no JVD.      CHEST:  Chest with clear breath sounds bilaterally.  No wheezes, rales, or rhonchi.    CARDIAC:  Regular rate and rhythm.  S1 and S2, without murmurs, gallops, or rubs.   VASCULAR:  No Edema.  Peripheral pulses normal and equal in all extremities.   ABDOMEN:  Soft, without detectable tenderness.  No sign of distention.  No   rebound or guarding, and no masses palpated.   Bowel Sounds normal.   MUSCULOSKELETAL:  Good range of motion of all major joints. Extremities without clubbing, cyanosis or edema.    NEUROLOGIC EXAM:  Alert and oriented x 3.  No focal sensory or strength deficits.   Speech normal.  Follows commands.   PSYCHIATRIC:  Mood normal.   SKIN:  No rash or lesions.      Assessment:   #IDC right breast, triple negative:   -03/02/2022: Bilateral diagnostic mammogram (chest wall pain)   -03/08/2022: Needle core biopsy   -04/11/2022: Right simple mastectomy and SLN biopsy   -2.2 cm, grade 3, no LVI, margins negative, 4 lymph nodes negative (including 3 negative SLN's)   >> pT2 pN0 MX, G3, triple negative, AJCC anatomic stage IIA, pathological prognostic stage IIA  -04/28/2022: FSH level 51.03 mIU/mL (postmenopausal); estradiol level <24 pg/mL  -05/06/2022:  TTE:  LVEF 55%  -05/23/2022:  Left subclavian MediPort placed  -cycle 1 adjuvant ddAC 05/30/2022  -cycle 2 adjuvant ddAC  06/13/2022      Plan:  -postmenopausal (per labs 04/28/2022)     -Needs genetic testing because of triple negative breast cancer; also, family history of prostate cancer     -Has no signs and symptoms of metastatic disease, no indication of additional imaging studies     -pT2 pN0, status postmastectomy   -Does not need adjuvant radiotherapy     -Needs adjuvant chemotherapy   -We will treat with DD AC every 2 weeks x4 cycles, followed by adjuvant Taxol  -cycle 3 of adjuvant ddAC, due around 06/27/2022  -check CBC and CMP every couple of weeks before each cycle of dose dense chemotherapy      Adjuvant chemotherapy:   1. Doxorubicin 60 mg/m² IV day 1;   2. Cyclophosphamide 600 mg/m² IV day 1;   Cycled every 14 days for 4 cycles; followed by,   3.  Paclitaxel 175 mg/m² by 3 hour IV infusion on day 1, cycled every 14 days for 4 cycles; or, paclitaxel 80 mg/m² IV 1 hour infusion weekly for 12 weeks.   All dose-dense cycles are with myeloid growth factor support.     -Consider adjuvant bisphosphonate therapy for risk reduction of distant metastasis for 3-5 years in postmenopausal patients (natural or induced) with high risk node-negative or node positive tumors   >>>   -Will offer adjuvant bisphosphonate therapy to this patient (Zometa 4 mg IV q.6 months x3-5 years)  -will need dental evaluation and preventive Dentistry prior, in order to prevent/minimize the likelihood of osteonecrosis of the jaw     -Addition of 1 year of adjuvant olaparib is an option in this patient with pT2 triple negative tumor if germline testing returns positive for BRCA1/2 mutation    Follow-up with NP in 2 weeks.     Above discussed at length with her.  All questions answered.   She understands and agrees this plan, and was appreciative.

## 2022-06-27 ENCOUNTER — OFFICE VISIT (OUTPATIENT)
Dept: HEMATOLOGY/ONCOLOGY | Facility: CLINIC | Age: 62
End: 2022-06-27
Payer: MEDICAID

## 2022-06-27 ENCOUNTER — TELEPHONE (OUTPATIENT)
Dept: HEMATOLOGY/ONCOLOGY | Facility: CLINIC | Age: 62
End: 2022-06-27
Payer: MEDICAID

## 2022-06-27 VITALS
HEIGHT: 64 IN | RESPIRATION RATE: 20 BRPM | DIASTOLIC BLOOD PRESSURE: 90 MMHG | WEIGHT: 137 LBS | BODY MASS INDEX: 23.39 KG/M2 | OXYGEN SATURATION: 100 % | HEART RATE: 94 BPM | TEMPERATURE: 98 F | SYSTOLIC BLOOD PRESSURE: 146 MMHG

## 2022-06-27 DIAGNOSIS — C50.911 INFILTRATING DUCTAL CARCINOMA OF RIGHT BREAST: ICD-10-CM

## 2022-06-27 DIAGNOSIS — Z80.42 FAMILY HISTORY OF MALIGNANT NEOPLASM OF PROSTATE: ICD-10-CM

## 2022-06-27 DIAGNOSIS — C50.919 TRIPLE NEGATIVE MALIGNANT NEOPLASM OF BREAST: Primary | ICD-10-CM

## 2022-06-27 PROCEDURE — 3008F PR BODY MASS INDEX (BMI) DOCUMENTED: ICD-10-PCS | Mod: CPTII,,, | Performed by: INTERNAL MEDICINE

## 2022-06-27 PROCEDURE — 3080F PR MOST RECENT DIASTOLIC BLOOD PRESSURE >= 90 MM HG: ICD-10-PCS | Mod: CPTII,,, | Performed by: INTERNAL MEDICINE

## 2022-06-27 PROCEDURE — 99213 OFFICE O/P EST LOW 20 MIN: CPT | Mod: PBBFAC | Performed by: INTERNAL MEDICINE

## 2022-06-27 PROCEDURE — 1159F MED LIST DOCD IN RCRD: CPT | Mod: CPTII,,, | Performed by: INTERNAL MEDICINE

## 2022-06-27 PROCEDURE — 3008F BODY MASS INDEX DOCD: CPT | Mod: CPTII,,, | Performed by: INTERNAL MEDICINE

## 2022-06-27 PROCEDURE — 85025 COMPLETE CBC W/AUTO DIFF WBC: CPT | Performed by: INTERNAL MEDICINE

## 2022-06-27 PROCEDURE — 99213 OFFICE O/P EST LOW 20 MIN: CPT | Mod: S$PBB,,, | Performed by: INTERNAL MEDICINE

## 2022-06-27 PROCEDURE — 99213 PR OFFICE/OUTPT VISIT, EST, LEVL III, 20-29 MIN: ICD-10-PCS | Mod: S$PBB,,, | Performed by: INTERNAL MEDICINE

## 2022-06-27 PROCEDURE — 3080F DIAST BP >= 90 MM HG: CPT | Mod: CPTII,,, | Performed by: INTERNAL MEDICINE

## 2022-06-27 PROCEDURE — 83735 ASSAY OF MAGNESIUM: CPT | Performed by: INTERNAL MEDICINE

## 2022-06-27 PROCEDURE — 1160F RVW MEDS BY RX/DR IN RCRD: CPT | Mod: CPTII,,, | Performed by: INTERNAL MEDICINE

## 2022-06-27 PROCEDURE — 80053 COMPREHEN METABOLIC PANEL: CPT | Performed by: INTERNAL MEDICINE

## 2022-06-27 PROCEDURE — 1160F PR REVIEW ALL MEDS BY PRESCRIBER/CLIN PHARMACIST DOCUMENTED: ICD-10-PCS | Mod: CPTII,,, | Performed by: INTERNAL MEDICINE

## 2022-06-27 PROCEDURE — 3077F SYST BP >= 140 MM HG: CPT | Mod: CPTII,,, | Performed by: INTERNAL MEDICINE

## 2022-06-27 PROCEDURE — 1159F PR MEDICATION LIST DOCUMENTED IN MEDICAL RECORD: ICD-10-PCS | Mod: CPTII,,, | Performed by: INTERNAL MEDICINE

## 2022-06-27 PROCEDURE — 3077F PR MOST RECENT SYSTOLIC BLOOD PRESSURE >= 140 MM HG: ICD-10-PCS | Mod: CPTII,,, | Performed by: INTERNAL MEDICINE

## 2022-06-27 NOTE — TELEPHONE ENCOUNTER
Orders for today:    Continue with adjuvant chemotherapy  CBC and CMP every 2 weeks before each cycle of chemotherapy    Genetic testing    Follow-up with NP in 2 weeks.

## 2022-06-28 ENCOUNTER — INFUSION (OUTPATIENT)
Dept: INFUSION THERAPY | Facility: HOSPITAL | Age: 62
End: 2022-06-28
Attending: INTERNAL MEDICINE
Payer: MEDICAID

## 2022-06-28 VITALS
OXYGEN SATURATION: 96 % | TEMPERATURE: 99 F | RESPIRATION RATE: 20 BRPM | DIASTOLIC BLOOD PRESSURE: 94 MMHG | HEART RATE: 94 BPM | SYSTOLIC BLOOD PRESSURE: 135 MMHG

## 2022-06-28 DIAGNOSIS — C50.911 INFILTRATING DUCTAL CARCINOMA OF RIGHT BREAST: Primary | ICD-10-CM

## 2022-06-28 PROCEDURE — A4216 STERILE WATER/SALINE, 10 ML: HCPCS | Performed by: INTERNAL MEDICINE

## 2022-06-28 PROCEDURE — 96413 CHEMO IV INFUSION 1 HR: CPT

## 2022-06-28 PROCEDURE — 96375 TX/PRO/DX INJ NEW DRUG ADDON: CPT

## 2022-06-28 PROCEDURE — 63600175 PHARM REV CODE 636 W HCPCS: Performed by: INTERNAL MEDICINE

## 2022-06-28 PROCEDURE — 96411 CHEMO IV PUSH ADDL DRUG: CPT

## 2022-06-28 PROCEDURE — 25000003 PHARM REV CODE 250: Performed by: INTERNAL MEDICINE

## 2022-06-28 RX ORDER — SODIUM CHLORIDE 0.9 % (FLUSH) 0.9 %
10 SYRINGE (ML) INJECTION
Status: DISCONTINUED | OUTPATIENT
Start: 2022-06-28 | End: 2022-06-28 | Stop reason: HOSPADM

## 2022-06-28 RX ORDER — DIPHENHYDRAMINE HYDROCHLORIDE 50 MG/ML
25 INJECTION INTRAMUSCULAR; INTRAVENOUS
Status: COMPLETED | OUTPATIENT
Start: 2022-06-28 | End: 2022-06-28

## 2022-06-28 RX ORDER — DOXORUBICIN HYDROCHLORIDE 2 MG/ML
60 INJECTION, SOLUTION INTRAVENOUS
Status: COMPLETED | OUTPATIENT
Start: 2022-06-28 | End: 2022-06-28

## 2022-06-28 RX ORDER — HEPARIN 100 UNIT/ML
500 SYRINGE INTRAVENOUS
Status: DISCONTINUED | OUTPATIENT
Start: 2022-06-28 | End: 2022-06-28 | Stop reason: HOSPADM

## 2022-06-28 RX ADMIN — APREPITANT 130 MG: 130 INJECTION, EMULSION INTRAVENOUS at 12:06

## 2022-06-28 RX ADMIN — SODIUM CHLORIDE, PRESERVATIVE FREE 10 ML: 5 INJECTION INTRAVENOUS at 02:06

## 2022-06-28 RX ADMIN — CYCLOPHOSPHAMIDE 960 MG: 1 INJECTION INTRAVENOUS at 12:06

## 2022-06-28 RX ADMIN — DOXORUBICIN HYDROCHLORIDE 96 MG: 2 INJECTION, SOLUTION INTRAVENOUS at 12:06

## 2022-06-28 RX ADMIN — HEPARIN 500 UNITS: 100 SYRINGE at 02:06

## 2022-06-28 RX ADMIN — DIPHENHYDRAMINE HYDROCHLORIDE 25 MG: 50 INJECTION INTRAMUSCULAR; INTRAVENOUS at 12:06

## 2022-06-28 RX ADMIN — SODIUM CHLORIDE: 9 INJECTION, SOLUTION INTRAVENOUS at 11:06

## 2022-06-28 RX ADMIN — DEXAMETHASONE SODIUM PHOSPHATE 0.25 MG: 4 INJECTION, SOLUTION INTRA-ARTICULAR; INTRALESIONAL; INTRAMUSCULAR; INTRAVENOUS; SOFT TISSUE at 12:06

## 2022-06-29 ENCOUNTER — INFUSION (OUTPATIENT)
Dept: INFUSION THERAPY | Facility: HOSPITAL | Age: 62
End: 2022-06-29
Attending: INTERNAL MEDICINE
Payer: MEDICAID

## 2022-06-29 VITALS
SYSTOLIC BLOOD PRESSURE: 124 MMHG | RESPIRATION RATE: 20 BRPM | TEMPERATURE: 99 F | DIASTOLIC BLOOD PRESSURE: 86 MMHG | HEART RATE: 85 BPM | OXYGEN SATURATION: 99 %

## 2022-06-29 DIAGNOSIS — C50.911 INFILTRATING DUCTAL CARCINOMA OF RIGHT BREAST: Primary | ICD-10-CM

## 2022-06-29 PROCEDURE — 63600175 PHARM REV CODE 636 W HCPCS: Mod: TB | Performed by: INTERNAL MEDICINE

## 2022-06-29 PROCEDURE — 96372 THER/PROPH/DIAG INJ SC/IM: CPT

## 2022-06-29 RX ADMIN — PEGFILGRASTIM-CBQV 6 MG: 6 INJECTION, SOLUTION SUBCUTANEOUS at 02:06

## 2022-06-29 NOTE — NURSING
Pt here for growth factor udenyca injection.  Pt reports back pain 5/10.  States has pain meds and claritan to take for joint pain from growth factor.  Pt reports heartburn and is starting on Tums.

## 2022-06-29 NOTE — PLAN OF CARE
Pt reporting back pain 5/10 today and will have more aching after udenyca.  Pt does have pain meds and claritan.

## 2022-07-11 ENCOUNTER — CLINICAL SUPPORT (OUTPATIENT)
Dept: HEMATOLOGY/ONCOLOGY | Facility: CLINIC | Age: 62
End: 2022-07-11
Payer: MEDICAID

## 2022-07-11 ENCOUNTER — OFFICE VISIT (OUTPATIENT)
Dept: HEMATOLOGY/ONCOLOGY | Facility: CLINIC | Age: 62
End: 2022-07-11
Payer: MEDICAID

## 2022-07-11 VITALS
SYSTOLIC BLOOD PRESSURE: 132 MMHG | HEIGHT: 64 IN | WEIGHT: 133.63 LBS | OXYGEN SATURATION: 98 % | TEMPERATURE: 98 F | RESPIRATION RATE: 20 BRPM | DIASTOLIC BLOOD PRESSURE: 85 MMHG | HEART RATE: 86 BPM | BODY MASS INDEX: 22.81 KG/M2

## 2022-07-11 DIAGNOSIS — C50.919 TRIPLE NEGATIVE MALIGNANT NEOPLASM OF BREAST: Primary | ICD-10-CM

## 2022-07-11 PROCEDURE — 1159F PR MEDICATION LIST DOCUMENTED IN MEDICAL RECORD: ICD-10-PCS | Mod: CPTII,,, | Performed by: NURSE PRACTITIONER

## 2022-07-11 PROCEDURE — 3079F DIAST BP 80-89 MM HG: CPT | Mod: CPTII,,, | Performed by: NURSE PRACTITIONER

## 2022-07-11 PROCEDURE — 99214 OFFICE O/P EST MOD 30 MIN: CPT | Mod: S$PBB,,, | Performed by: NURSE PRACTITIONER

## 2022-07-11 PROCEDURE — 3079F PR MOST RECENT DIASTOLIC BLOOD PRESSURE 80-89 MM HG: ICD-10-PCS | Mod: CPTII,,, | Performed by: NURSE PRACTITIONER

## 2022-07-11 PROCEDURE — 3075F PR MOST RECENT SYSTOLIC BLOOD PRESS GE 130-139MM HG: ICD-10-PCS | Mod: CPTII,,, | Performed by: NURSE PRACTITIONER

## 2022-07-11 PROCEDURE — 3075F SYST BP GE 130 - 139MM HG: CPT | Mod: CPTII,,, | Performed by: NURSE PRACTITIONER

## 2022-07-11 PROCEDURE — 3008F PR BODY MASS INDEX (BMI) DOCUMENTED: ICD-10-PCS | Mod: CPTII,,, | Performed by: NURSE PRACTITIONER

## 2022-07-11 PROCEDURE — 1159F MED LIST DOCD IN RCRD: CPT | Mod: CPTII,,, | Performed by: NURSE PRACTITIONER

## 2022-07-11 PROCEDURE — 1160F PR REVIEW ALL MEDS BY PRESCRIBER/CLIN PHARMACIST DOCUMENTED: ICD-10-PCS | Mod: CPTII,,, | Performed by: NURSE PRACTITIONER

## 2022-07-11 PROCEDURE — 99214 OFFICE O/P EST MOD 30 MIN: CPT | Mod: PBBFAC | Performed by: NURSE PRACTITIONER

## 2022-07-11 PROCEDURE — 1160F RVW MEDS BY RX/DR IN RCRD: CPT | Mod: CPTII,,, | Performed by: NURSE PRACTITIONER

## 2022-07-11 PROCEDURE — 99214 PR OFFICE/OUTPT VISIT, EST, LEVL IV, 30-39 MIN: ICD-10-PCS | Mod: S$PBB,,, | Performed by: NURSE PRACTITIONER

## 2022-07-11 PROCEDURE — 3008F BODY MASS INDEX DOCD: CPT | Mod: CPTII,,, | Performed by: NURSE PRACTITIONER

## 2022-07-11 RX ORDER — HEPARIN 100 UNIT/ML
500 SYRINGE INTRAVENOUS
Status: CANCELLED | OUTPATIENT
Start: 2022-07-12

## 2022-07-11 RX ORDER — DIPHENHYDRAMINE HYDROCHLORIDE 50 MG/ML
25 INJECTION INTRAMUSCULAR; INTRAVENOUS
Status: CANCELLED
Start: 2022-07-12

## 2022-07-11 RX ORDER — SODIUM CHLORIDE 0.9 % (FLUSH) 0.9 %
10 SYRINGE (ML) INJECTION
Status: CANCELLED | OUTPATIENT
Start: 2022-07-12

## 2022-07-11 RX ORDER — DOXORUBICIN HYDROCHLORIDE 2 MG/ML
60 INJECTION, SOLUTION INTRAVENOUS
Status: CANCELLED | OUTPATIENT
Start: 2022-07-12

## 2022-07-11 NOTE — PROGRESS NOTES
Problem List:  -IDC right breast, triple negative, S/P right mastectomy 2022, pT2 pN0 MX, G3, AJCC anatomic stage IIA, pathological prognostic stage IIA  -family history of prostate cancer    Current Treatment:  DD AC every 2 weeks x4 cycles    Start 2022    Treatment History:  -2022: Right simple mastectomy and SLN biopsy    Past medical history: Tobacco abuse.  Hypertension.  Arthritis of right shoulder.  Procedure/surgical history:   Umbilical hernia repair .  Social history: .  Lives in [place], [place].  Has 4 children.  Works as a  in a [employer].  Has been smoking 5 cigarettes daily for 15 years.  Drinks half a pint of liquor over the weekend.  No illicit drugs.  Family history:   2 brothers experienced prostate cancer in their 50s   Father  from prostate cancer in his 60s-70s  No family history of breast cancer.  Menstrual and OB/GYN history: Menarche, age 14.  Menopause, late 40s.  First child at age 26 years.  No abortions or miscarriages.  Used birth control pills for 8 years.  Did not use hormone replacement therapy after menopause.  Breast-fed 1 child for 7 months.       History of Present Illness:   61-year-old female referred from surgery clinic, with newly diagnosed right breast cancer.      She never palpated breast mass.  She noticed breast/chest wall pain after extensive muscle use in 2021.  At that time, she was diagnosed with musculoskeletal pain.  In January, she went back to emergency department when symptoms did not resolve.  At that time, physical examination apparently noted irregularity in breast and she was referred for further imaging and biopsy.    Interval History 2022: Patient presents today alone for scheduled follow up for triple negative breast cancer. She is due to receive DDAC cycle 4 tomorrow. She reports symptoms of weakness, fatigue, constipation most significant right after neulasta injection. She is  excited that this will be her last cycle of AC and looking forward to starting Taxol. Patient has decided that she would like to do Taxol Q 2 weeks. Discussed regimen with patient once again. Patient verbalized understanding. Discussed follow up appointments with patient. Patient reports that she drinks organic tea for her constipation. Lab work reviewed with patient stable for treatment tomorrow. Wbc elevated however we will continue with cycle 4 DDAC tomorrow as scheduled. Patient denies any further questions needs or concerns.     Review of Systems: A complete 12-point ROS was performed in full with pertinent positives as described in interval history. Remainder of ROS done in full and are negative.    Lab Results   Component Value Date    WBC 22.9 (H) 07/11/2022    HGB 11.0 (L) 07/11/2022    HCT 34.3 (L) 07/11/2022    MCV 95.3 (H) 07/11/2022     07/11/2022       CMP  Sodium Level   Date Value Ref Range Status   07/11/2022 138 136 - 145 mmol/L Final     Potassium Level   Date Value Ref Range Status   07/11/2022 4.0 3.5 - 5.1 mmol/L Final     Carbon Dioxide   Date Value Ref Range Status   07/11/2022 27 22 - 29 mmol/L Final     Blood Urea Nitrogen   Date Value Ref Range Status   07/11/2022 16.4 9.8 - 20.1 mg/dL Final     Creatinine   Date Value Ref Range Status   07/11/2022 0.74 0.55 - 1.02 mg/dL Final     Calcium Level Total   Date Value Ref Range Status   07/11/2022 10.1 8.4 - 10.2 mg/dL Final     Albumin Level   Date Value Ref Range Status   07/11/2022 4.0 3.5 - 5.0 gm/dL Final     Bilirubin Total   Date Value Ref Range Status   07/11/2022 0.3 <=1.5 mg/dL Final     Alkaline Phosphatase   Date Value Ref Range Status   07/11/2022 109 40 - 150 unit/L Final     Aspartate Aminotransferase   Date Value Ref Range Status   07/11/2022 16 5 - 34 unit/L Final     Alanine Aminotransferase   Date Value Ref Range Status   07/11/2022 14 0 - 55 unit/L Final     Estimated GFR-Non    Date Value Ref Range  Status   04/28/2022 85 >=90        Physical Exam    Vitals & Measurements  Vitals:    07/11/22 1102   BP: 132/85   Pulse: 86   Resp: 20   Temp: 97.9 °F (36.6 °C)       General: Alert and oriented. NAD  Eye: Pupils are equal, round and reactive to light, Extraocular movements are intact. Normal conjunctiva  HENT: Normocephalic. Oropharynx exam deferred; mask in place due to coronavirus  Neck: Supple, Non-tender  Respiratory: Respirations are non-labored, Symmetrical chest wall expansion. Breath sounds CTA bilaterally  Cardiovascular: Regular rate, rhythm, Normal peripheral perfusion, No bilateral lower extremity edema  Gastrointestinal: Non-distended, Present bowel sounds   Genitourinary: Exam deferred  Lymphatics: No lymphadenopathy appreciated  Musculoskeletal: Moves all extremities  Integumentary: Intact. Warm, dry. No rashes, or lesions to visible skin  Neurologic: No focal deficits  Psychiatric: Cooperative. Appropriate mood and affect   ECOG Performance Scale: 0 - Capable of all self-care no restrictions      Assessment:   #IDC right breast, triple negative:   -03/02/2022: Bilateral diagnostic mammogram (chest wall pain)   -03/08/2022: Needle core biopsy   -04/11/2022: Right simple mastectomy and SLN biopsy   -2.2 cm, grade 3, no LVI, margins negative, 4 lymph nodes negative (including 3 negative SLN's)   >> pT2 pN0 MX, G3, triple negative, AJCC anatomic stage IIA, pathological prognostic stage IIA  -04/28/2022: FSH level 51.03 mIU/mL (postmenopausal); estradiol level <24 pg/mL  -05/06/2022:  TTE:  LVEF 55%  -05/23/2022:  Left subclavian MediPort placed  -cycle 1 adjuvant ddAC 05/30/2022  -cycle 2 adjuvant ddAC 06/13/2022     Plan:  -postmenopausal (per labs 04/28/2022)      -Needs genetic testing because of triple negative breast cancer; also, family history of prostate cancer      -Has no signs and symptoms of metastatic disease, no indication of additional imaging studies      -pT2 pN0, status  postmastectomy   -Does not need adjuvant radiotherapy      -Needs adjuvant chemotherapy   -We will treat with DD AC every 2 weeks x4 cycles, followed by adjuvant Taxol    -Okay to proceed with cycle 4 of DDAC tomorrow  -follow up with  in 2 weeks with lab work prior to starting adjuvant Taxol DD  -check CBC and CMP every couple of weeks before each cycle of dose dense chemotherapy       Adjuvant chemotherapy:   1. Doxorubicin 60 mg/m² IV day 1;   2. Cyclophosphamide 600 mg/m² IV day 1;   Cycled every 14 days for 4 cycles; followed by,   3.  Paclitaxel 175 mg/m² by 3 hour IV infusion on day 1, cycled every 14 days for 4 cycles; or, paclitaxel 80 mg/m² IV 1 hour infusion weekly for 12 weeks.   All dose-dense cycles are with myeloid growth factor support.      -Consider adjuvant bisphosphonate therapy for risk reduction of distant metastasis for 3-5 years in postmenopausal patients (natural or induced) with high risk node-negative or node positive tumors   >>>   -Will offer adjuvant bisphosphonate therapy to this patient (Zometa 4 mg IV q.6 months x3-5 years)  -will need dental evaluation and preventive Dentistry prior, in order to prevent/minimize the likelihood of osteonecrosis of the jaw      -Addition of 1 year of adjuvant olaparib is an option in this patient with pT2 triple negative tumor if germline testing returns positive for BRCA1/2 mutation      Above discussed at length with her.  All questions answered.   She understands and agrees this plan, and was appreciative.

## 2022-07-12 ENCOUNTER — INFUSION (OUTPATIENT)
Dept: INFUSION THERAPY | Facility: HOSPITAL | Age: 62
End: 2022-07-12
Attending: INTERNAL MEDICINE
Payer: MEDICAID

## 2022-07-12 VITALS
WEIGHT: 133.63 LBS | SYSTOLIC BLOOD PRESSURE: 135 MMHG | RESPIRATION RATE: 20 BRPM | DIASTOLIC BLOOD PRESSURE: 101 MMHG | BODY MASS INDEX: 23.09 KG/M2 | TEMPERATURE: 98 F | HEART RATE: 84 BPM

## 2022-07-12 DIAGNOSIS — C50.911 INFILTRATING DUCTAL CARCINOMA OF RIGHT BREAST: Primary | ICD-10-CM

## 2022-07-12 PROCEDURE — 96411 CHEMO IV PUSH ADDL DRUG: CPT

## 2022-07-12 PROCEDURE — 63600175 PHARM REV CODE 636 W HCPCS: Mod: TB | Performed by: NURSE PRACTITIONER

## 2022-07-12 PROCEDURE — 96413 CHEMO IV INFUSION 1 HR: CPT

## 2022-07-12 PROCEDURE — 96367 TX/PROPH/DG ADDL SEQ IV INF: CPT

## 2022-07-12 PROCEDURE — 25000003 PHARM REV CODE 250: Performed by: NURSE PRACTITIONER

## 2022-07-12 PROCEDURE — 96375 TX/PRO/DX INJ NEW DRUG ADDON: CPT

## 2022-07-12 RX ORDER — SODIUM CHLORIDE 0.9 % (FLUSH) 0.9 %
10 SYRINGE (ML) INJECTION
Status: DISCONTINUED | OUTPATIENT
Start: 2022-07-12 | End: 2022-07-12 | Stop reason: HOSPADM

## 2022-07-12 RX ORDER — DIPHENHYDRAMINE HYDROCHLORIDE 50 MG/ML
25 INJECTION INTRAMUSCULAR; INTRAVENOUS
Status: COMPLETED | OUTPATIENT
Start: 2022-07-12 | End: 2022-07-12

## 2022-07-12 RX ORDER — DOXORUBICIN HYDROCHLORIDE 2 MG/ML
60 INJECTION, SOLUTION INTRAVENOUS
Status: COMPLETED | OUTPATIENT
Start: 2022-07-12 | End: 2022-07-12

## 2022-07-12 RX ORDER — HEPARIN 100 UNIT/ML
500 SYRINGE INTRAVENOUS
Status: DISCONTINUED | OUTPATIENT
Start: 2022-07-12 | End: 2022-07-12 | Stop reason: HOSPADM

## 2022-07-12 RX ADMIN — DEXAMETHASONE SODIUM PHOSPHATE 0.25 MG: 4 INJECTION, SOLUTION INTRA-ARTICULAR; INTRALESIONAL; INTRAMUSCULAR; INTRAVENOUS; SOFT TISSUE at 08:07

## 2022-07-12 RX ADMIN — CYCLOPHOSPHAMIDE 960 MG: 1 INJECTION INTRAVENOUS at 09:07

## 2022-07-12 RX ADMIN — SODIUM CHLORIDE: 9 INJECTION, SOLUTION INTRAVENOUS at 08:07

## 2022-07-12 RX ADMIN — DOXORUBICIN HYDROCHLORIDE 96 MG: 2 INJECTION, SOLUTION INTRAVENOUS at 09:07

## 2022-07-12 RX ADMIN — DIPHENHYDRAMINE HYDROCHLORIDE 25 MG: 50 INJECTION, SOLUTION INTRAMUSCULAR; INTRAVENOUS at 08:07

## 2022-07-12 RX ADMIN — APREPITANT 130 MG: 130 INJECTION, EMULSION INTRAVENOUS at 08:07

## 2022-07-13 ENCOUNTER — INFUSION (OUTPATIENT)
Dept: INFUSION THERAPY | Facility: HOSPITAL | Age: 62
End: 2022-07-13
Attending: INTERNAL MEDICINE
Payer: MEDICAID

## 2022-07-13 VITALS
SYSTOLIC BLOOD PRESSURE: 128 MMHG | HEART RATE: 81 BPM | TEMPERATURE: 98 F | OXYGEN SATURATION: 98 % | WEIGHT: 132.94 LBS | RESPIRATION RATE: 18 BRPM | DIASTOLIC BLOOD PRESSURE: 82 MMHG | BODY MASS INDEX: 22.98 KG/M2

## 2022-07-13 DIAGNOSIS — C50.911 INFILTRATING DUCTAL CARCINOMA OF RIGHT BREAST: Primary | ICD-10-CM

## 2022-07-13 PROCEDURE — 63600175 PHARM REV CODE 636 W HCPCS: Mod: TB | Performed by: NURSE PRACTITIONER

## 2022-07-13 PROCEDURE — 96372 THER/PROPH/DIAG INJ SC/IM: CPT

## 2022-07-13 RX ADMIN — PEGFILGRASTIM-CBQV 6 MG: 6 INJECTION, SOLUTION SUBCUTANEOUS at 11:07

## 2022-07-25 ENCOUNTER — TELEPHONE (OUTPATIENT)
Dept: HEMATOLOGY/ONCOLOGY | Facility: CLINIC | Age: 62
End: 2022-07-25
Payer: MEDICAID

## 2022-07-25 ENCOUNTER — CLINICAL SUPPORT (OUTPATIENT)
Dept: HEMATOLOGY/ONCOLOGY | Facility: CLINIC | Age: 62
End: 2022-07-25
Payer: MEDICAID

## 2022-07-25 ENCOUNTER — OFFICE VISIT (OUTPATIENT)
Dept: HEMATOLOGY/ONCOLOGY | Facility: CLINIC | Age: 62
End: 2022-07-25
Payer: MEDICAID

## 2022-07-25 VITALS
OXYGEN SATURATION: 96 % | BODY MASS INDEX: 22.65 KG/M2 | RESPIRATION RATE: 20 BRPM | HEIGHT: 64 IN | SYSTOLIC BLOOD PRESSURE: 144 MMHG | TEMPERATURE: 98 F | HEART RATE: 84 BPM | DIASTOLIC BLOOD PRESSURE: 88 MMHG | WEIGHT: 132.69 LBS

## 2022-07-25 DIAGNOSIS — C50.911 INFILTRATING DUCTAL CARCINOMA OF RIGHT BREAST: Primary | ICD-10-CM

## 2022-07-25 DIAGNOSIS — C50.919 TRIPLE NEGATIVE MALIGNANT NEOPLASM OF BREAST: ICD-10-CM

## 2022-07-25 DIAGNOSIS — Z80.42 FAMILY HISTORY OF MALIGNANT NEOPLASM OF PROSTATE: ICD-10-CM

## 2022-07-25 LAB
ABS NEUT CALC (OHS): 14.35 X10(3)/MCL (ref 2.1–9.2)
ALBUMIN SERPL-MCNC: 3.9 GM/DL (ref 3.5–5)
ALBUMIN/GLOB SERPL: 1.2 RATIO (ref 1.1–2)
ALP SERPL-CCNC: 115 UNIT/L (ref 40–150)
ALT SERPL-CCNC: 10 UNIT/L (ref 0–55)
ANISOCYTOSIS BLD QL SMEAR: ABNORMAL
AST SERPL-CCNC: 15 UNIT/L (ref 5–34)
BASOPHILS NFR BLD MANUAL: 0.18 X10(3)/MCL (ref 0–0.2)
BASOPHILS NFR BLD MANUAL: 1 % (ref 0–2)
BILIRUBIN DIRECT+TOT PNL SERPL-MCNC: 0.3 MG/DL
BUN SERPL-MCNC: 8.8 MG/DL (ref 9.8–20.1)
CALCIUM SERPL-MCNC: 9.4 MG/DL (ref 8.4–10.2)
CHLORIDE SERPL-SCNC: 101 MMOL/L (ref 98–107)
CO2 SERPL-SCNC: 28 MMOL/L (ref 22–29)
CREAT SERPL-MCNC: 0.71 MG/DL (ref 0.55–1.02)
EOSINOPHIL NFR BLD MANUAL: 0.18 X10(3)/MCL (ref 0–0.9)
EOSINOPHIL NFR BLD MANUAL: 1 % (ref 0–8)
ERYTHROCYTE [DISTWIDTH] IN BLOOD BY AUTOMATED COUNT: 16.1 % (ref 11.5–17)
GLOBULIN SER-MCNC: 3.2 GM/DL (ref 2.4–3.5)
GLUCOSE SERPL-MCNC: 88 MG/DL (ref 74–100)
HCT VFR BLD AUTO: 33.5 % (ref 37–47)
HGB BLD-MCNC: 10.9 GM/DL (ref 12–16)
IMM GRANULOCYTES # BLD AUTO: 2.39 X10(3)/MCL (ref 0–0.04)
IMM GRANULOCYTES NFR BLD AUTO: 13 %
LYMPHOCYTES NFR BLD MANUAL: 16 % (ref 13–40)
LYMPHOCYTES NFR BLD MANUAL: 2.94 X10(3)/MCL
MACROCYTES BLD QL SMEAR: ABNORMAL
MAGNESIUM SERPL-MCNC: 2.1 MG/DL (ref 1.6–2.6)
MCH RBC QN AUTO: 31 PG (ref 27–31)
MCHC RBC AUTO-ENTMCNC: 32.5 MG/DL (ref 33–36)
MCV RBC AUTO: 95.2 FL (ref 80–94)
METAMYELOCYTES NFR BLD MANUAL: 5 %
MONOCYTES NFR BLD MANUAL: 0.74 X10(3)/MCL (ref 0.1–1.3)
MONOCYTES NFR BLD MANUAL: 4 % (ref 2–11)
MYELOCYTES NFR BLD MANUAL: 2 %
NEUTROPHILS NFR BLD MANUAL: 67 % (ref 47–80)
NEUTS BAND NFR BLD MANUAL: 4 % (ref 0–11)
NRBC BLD AUTO-RTO: 0.8 %
PLATELET # BLD AUTO: 241 X10(3)/MCL (ref 130–400)
PLATELET # BLD EST: ADEQUATE 10*3/UL
PMV BLD AUTO: 9.6 FL (ref 7.4–10.4)
POLYCHROMASIA BLD QL SMEAR: SLIGHT
POTASSIUM SERPL-SCNC: 3.6 MMOL/L (ref 3.5–5.1)
PROT SERPL-MCNC: 7.1 GM/DL (ref 6.4–8.3)
RBC # BLD AUTO: 3.52 X10(6)/MCL (ref 4.2–5.4)
RBC MORPH BLD: ABNORMAL
SODIUM SERPL-SCNC: 138 MMOL/L (ref 136–145)
WBC # SPEC AUTO: 18.4 X10(3)/MCL (ref 4.5–11.5)

## 2022-07-25 PROCEDURE — 36415 COLL VENOUS BLD VENIPUNCTURE: CPT

## 2022-07-25 PROCEDURE — 99214 OFFICE O/P EST MOD 30 MIN: CPT | Mod: PBBFAC | Performed by: INTERNAL MEDICINE

## 2022-07-25 PROCEDURE — 83735 ASSAY OF MAGNESIUM: CPT

## 2022-07-25 PROCEDURE — 3077F PR MOST RECENT SYSTOLIC BLOOD PRESSURE >= 140 MM HG: ICD-10-PCS | Mod: CPTII,,, | Performed by: INTERNAL MEDICINE

## 2022-07-25 PROCEDURE — 1159F PR MEDICATION LIST DOCUMENTED IN MEDICAL RECORD: ICD-10-PCS | Mod: CPTII,,, | Performed by: INTERNAL MEDICINE

## 2022-07-25 PROCEDURE — 3008F BODY MASS INDEX DOCD: CPT | Mod: CPTII,,, | Performed by: INTERNAL MEDICINE

## 2022-07-25 PROCEDURE — 3008F PR BODY MASS INDEX (BMI) DOCUMENTED: ICD-10-PCS | Mod: CPTII,,, | Performed by: INTERNAL MEDICINE

## 2022-07-25 PROCEDURE — 3077F SYST BP >= 140 MM HG: CPT | Mod: CPTII,,, | Performed by: INTERNAL MEDICINE

## 2022-07-25 PROCEDURE — 3079F PR MOST RECENT DIASTOLIC BLOOD PRESSURE 80-89 MM HG: ICD-10-PCS | Mod: CPTII,,, | Performed by: INTERNAL MEDICINE

## 2022-07-25 PROCEDURE — 3079F DIAST BP 80-89 MM HG: CPT | Mod: CPTII,,, | Performed by: INTERNAL MEDICINE

## 2022-07-25 PROCEDURE — 99213 PR OFFICE/OUTPT VISIT, EST, LEVL III, 20-29 MIN: ICD-10-PCS | Mod: S$PBB,,, | Performed by: INTERNAL MEDICINE

## 2022-07-25 PROCEDURE — 85027 COMPLETE CBC AUTOMATED: CPT

## 2022-07-25 PROCEDURE — 1159F MED LIST DOCD IN RCRD: CPT | Mod: CPTII,,, | Performed by: INTERNAL MEDICINE

## 2022-07-25 PROCEDURE — 1160F RVW MEDS BY RX/DR IN RCRD: CPT | Mod: CPTII,,, | Performed by: INTERNAL MEDICINE

## 2022-07-25 PROCEDURE — 99213 OFFICE O/P EST LOW 20 MIN: CPT | Mod: S$PBB,,, | Performed by: INTERNAL MEDICINE

## 2022-07-25 PROCEDURE — 1160F PR REVIEW ALL MEDS BY PRESCRIBER/CLIN PHARMACIST DOCUMENTED: ICD-10-PCS | Mod: CPTII,,, | Performed by: INTERNAL MEDICINE

## 2022-07-25 PROCEDURE — 80053 COMPREHEN METABOLIC PANEL: CPT

## 2022-07-25 NOTE — PROGRESS NOTES
Past medical history: Tobacco abuse.  Hypertension.  Arthritis of right shoulder.  Procedure/surgical history:   Umbilical hernia repair .  Social history: .  Lives in [place], [place].  Has 4 children.  Works as a  in a [employer].  Has been smoking 5 cigarettes daily for 15 years.  Drinks half a pint of liquor over the weekend.  No illicit drugs.  Family history:   2 brothers experienced prostate cancer in their 50s   Father  from prostate cancer in his 60s-70s  No family history of breast cancer.  Menstrual and OB/GYN history: Menarche, age 14.  Menopause, late 40s.  First child at age 26 years.  No abortions or miscarriages.  Used birth control pills for 8 years.  Did not use hormone replacement therapy after menopause.  Breast-fed 1 child for 7 months.      Reason for follow-up:  -IDC right breast, triple negative, S/P right mastectomy 2022, pT2 pN0 MX, G3, AJCC anatomic stage IIA, pathological prognostic stage IIA  -family history of prostate cancer      History of present illness:   61-year-old female referred from surgery clinic, with newly diagnosed right breast cancer.     She never palpated breast mass.  She noticed breast/chest wall pain after extensive muscle use in 2021.  At that time, she was diagnosed with musculoskeletal pain.  In January, she went back to emergency department when symptoms did not resolve.  At that time, physical examination apparently noted irregularity in breast and she was referred for further imaging and biopsy.     2022: Bilateral diagnostic mammogram with tomosynthesis, limited ultrasound right breast (indication: Right breast lump, upper outer quadrant) (comparison: 2018):   -Right breast: Suspicious abnormality (BI-RADS 4)   -Left breast: Negative (BI-RADS 1)     2022:   1.  Right axillary lymph node with 3 mm cortex at 10:00, 10 cm from nipple, ultrasound-guided needle core biopsy: No evidence of  carcinoma;   2.  Right upper outer breast 2.6 x 1.5 x 2.7 cm mass at 10:00, 7 cm from nipple, ultrasound-guided needle core biopsy: Invasive ductal carcinoma; overall grade 3     ER negative (0%).  CA negative (0%).  HER2 negative (score 0).  Ki-67 high proliferation (90%).     03/18/2022: Bilateral diagnostic mammogram with contrast: Limited ultrasound right breast:   -Right breast: Known biopsy-proven malignancy (BI-RADS 5)   -Left breast: Negative (BI-RADS 1)     04/11/2022: Right mastectomy, right SLN biopsy     04/11/2022:   1.  Right breast, simple mastectomy: Invasive ductal carcinoma; overall grade 3; 2.2 cm; no DCIS; no LVI; all margins negative (closest margin, 5 mm; all regional lymph nodes negative for tumor; number of lymph nodes examined, 4; number of SLN's examined, 3; number of lymph nodes with metastasis, 0   >>pT2 pN0      04/28/2022:   Presents for initial medical oncology consultation, accompanied by her sister and daughters.  Very pleasant lady.  Appears healthy.  In no acute discomfort.   -Apart from postoperative right chest wall and right axillary pain, no other symptoms.  No weakness, fatigue, malaise, fevers, chills, unusual headaches, focal neurological symptoms, chest pain, cough, dyspnea, hemoptysis, abdominal pain, nausea, vomiting, anorexia, unintentional weight loss, postmenopausal spotting, bone pains, any urinary problems, etc.  ECOG 0.      Interval history    06/27/2022:  -04/28/2022: FSH level 51.03 mIU/mL (postmenopausal); estradiol level <24 pg/mL  -05/06/2022:  TTE:  LVEF 55%  -05/23/2022:  Left subclavian MediPort placed  -cycle 1 adjuvant ddAC 05/30/2022  -cycle 2 adjuvant ddAC 06/13/2022  -06/27/2022:  Labs reviewed; WBC 18.9.  Hemoglobin 11.7.  Platelets 276 K. CMP unremarkable.  Differential count is pending.    Presents for a follow-up visit.  Doing great.  She are full and full of energy.  Very little side effects with chemo.  Does feel bone pains and generalized  soreness for 1 or 2 days after Neulasta shot; then, bounces back.  ECOG 0. Good appetite.  No nausea vomiting.  No chest pain, cough, or dyspnea.  No fevers or chills.  No unusual headaches or focal neurological symptoms.     07/25/2022:  -cycle 3 of adjuvant ddAC 06/28/2022  -cycle 4 of adjuvant ddAC 07/12/2022  Presents for a follow-up visit.  Says that overall, she is doing well.  Has experienced weakness, fatigue, headaches, and some heartburn with chemotherapy.  Also reports darkening of hands, which is chemotherapy side effect.  Experiences generalized aches and pains after chemotherapy and Neulasta shot. Reports dysgeusia.  Some generalized weakness.  Fairly good appetite.  No severe headaches or focal neurological symptoms.  No dyspnea.  Ready to start dose dense Taxol.       Review of systems:   All systems reviewed, and found to be negative except for the symptoms detailed above.      Physical examination:   VITAL SIGNS:  Reviewed.      GENERAL:  In no apparent distress.    HEAD:  No signs of head trauma.   EYES:  Pupils are equal.  Extraocular motions intact.    EARS:  Hearing grossly intact.   MOUTH:  Oropharynx is normal.   NECK:  No adenopathy, no JVD.      CHEST:  Chest with clear breath sounds bilaterally.  No wheezes, rales, or rhonchi.    CARDIAC:  Regular rate and rhythm.  S1 and S2, without murmurs, gallops, or rubs.   VASCULAR:  No Edema.  Peripheral pulses normal and equal in all extremities.   ABDOMEN:  Soft, without detectable tenderness.  No sign of distention.  No   rebound or guarding, and no masses palpated.   Bowel Sounds normal.   MUSCULOSKELETAL:  Good range of motion of all major joints. Extremities without clubbing, cyanosis or edema.    NEUROLOGIC EXAM:  Alert and oriented x 3.  No focal sensory or strength deficits.   Speech normal.  Follows commands.   PSYCHIATRIC:  Mood normal.   SKIN:  No rash or lesions.      Assessment:   #IDC right breast, triple negative:   -03/02/2022:  Bilateral diagnostic mammogram (chest wall pain)   -03/08/2022: Needle core biopsy   -04/11/2022: Right simple mastectomy and SLN biopsy   -2.2 cm, grade 3, no LVI, margins negative, 4 lymph nodes negative (including 3 negative SLN's)   >> pT2 pN0 MX, G3, triple negative, AJCC anatomic stage IIA, pathological prognostic stage IIA  -04/28/2022: FSH level 51.03 mIU/mL (postmenopausal); estradiol level <24 pg/mL  -05/06/2022:  TTE:  LVEF 55%  -05/23/2022:  Left subclavian MediPort placed  -cycle 1 adjuvant ddAC 05/30/2022  -cycle 2 adjuvant ddAC 06/13/2022  -cycle 3 of adjuvant ddAC 06/28/2022  -cycle 4 of adjuvant ddAC 07/12/2022      Plan:  -postmenopausal (per labs 04/28/2022)     -Needs genetic testing because of triple negative breast cancer; also, family history of prostate cancer     -Has no signs and symptoms of metastatic disease, no indication of additional imaging studies     -pT2 pN0, status postmastectomy   -Does not need adjuvant radiotherapy     -Needs adjuvant chemotherapy   -We will treat with DD AC every 2 weeks x4 cycles, followed by adjuvant Taxol  >>>  -s/p adjuvant ddAC x4 (05/30/2022-07/12/2022)  >>>  -now, start dose dense Taxol, 175 mg per m2 IV q2 weeks x4 cycles, with growth factor support  -check CBC and CMP every 2 weeks before each cycle of chemotherapy     Adjuvant chemotherapy:   1. Doxorubicin 60 mg/m² IV day 1;   2. Cyclophosphamide 600 mg/m² IV day 1;   Cycled every 14 days for 4 cycles; followed by,   3.  Paclitaxel 175 mg/m² by 3 hour IV infusion on day 1, cycled every 14 days for 4 cycles; or, paclitaxel 80 mg/m² IV 1 hour infusion weekly for 12 weeks.   All dose-dense cycles are with myeloid growth factor support.     -Consider adjuvant bisphosphonate therapy for risk reduction of distant metastasis for 3-5 years in postmenopausal patients (natural or induced) with high risk node-negative or node positive tumors   >>>   -Will offer adjuvant bisphosphonate therapy to this patient  (Zometa 4 mg IV q.6 months x3-5 years)  -will need dental evaluation and preventive Dentistry prior, in order to prevent/minimize the likelihood of osteonecrosis of the jaw     -Addition of 1 year of adjuvant olaparib is an option in this patient with pT2 triple negative tumor if germline testing returns positive for BRCA1/2 mutation    Follow-up with NP in 2 weeks.     Above discussed at length with her.  All questions answered.   She understands and agrees this plan, and was appreciative.

## 2022-07-25 NOTE — TELEPHONE ENCOUNTER
Orders for today:    Genetic testing    Start dose dense Taxol every 2 weeks x4 cycles with growth factor support  Check CBC and CMP every 2 weeks before each cycle of chemotherapy    Follow-up with NP in 2 weeks.

## 2022-08-01 ENCOUNTER — INFUSION (OUTPATIENT)
Dept: INFUSION THERAPY | Facility: HOSPITAL | Age: 62
End: 2022-08-01
Attending: INTERNAL MEDICINE
Payer: MEDICAID

## 2022-08-01 VITALS
RESPIRATION RATE: 20 BRPM | TEMPERATURE: 99 F | DIASTOLIC BLOOD PRESSURE: 92 MMHG | HEIGHT: 64 IN | SYSTOLIC BLOOD PRESSURE: 144 MMHG | WEIGHT: 134.06 LBS | HEART RATE: 77 BPM | BODY MASS INDEX: 22.89 KG/M2

## 2022-08-01 DIAGNOSIS — C50.911 INFILTRATING DUCTAL CARCINOMA OF RIGHT BREAST: Primary | ICD-10-CM

## 2022-08-01 PROCEDURE — 63600175 PHARM REV CODE 636 W HCPCS: Performed by: INTERNAL MEDICINE

## 2022-08-01 PROCEDURE — 96365 THER/PROPH/DIAG IV INF INIT: CPT

## 2022-08-01 PROCEDURE — 96415 CHEMO IV INFUSION ADDL HR: CPT

## 2022-08-01 PROCEDURE — 96375 TX/PRO/DX INJ NEW DRUG ADDON: CPT

## 2022-08-01 PROCEDURE — 96413 CHEMO IV INFUSION 1 HR: CPT

## 2022-08-01 PROCEDURE — A4216 STERILE WATER/SALINE, 10 ML: HCPCS | Performed by: NURSE PRACTITIONER

## 2022-08-01 PROCEDURE — 25000003 PHARM REV CODE 250: Performed by: NURSE PRACTITIONER

## 2022-08-01 PROCEDURE — 63600175 PHARM REV CODE 636 W HCPCS: Performed by: NURSE PRACTITIONER

## 2022-08-01 RX ORDER — DIPHENHYDRAMINE HYDROCHLORIDE 50 MG/ML
50 INJECTION INTRAMUSCULAR; INTRAVENOUS ONCE
Status: COMPLETED | OUTPATIENT
Start: 2022-08-01 | End: 2022-08-01

## 2022-08-01 RX ORDER — FAMOTIDINE 10 MG/ML
20 INJECTION INTRAVENOUS
Status: CANCELLED | OUTPATIENT
Start: 2022-08-01

## 2022-08-01 RX ORDER — DIPHENHYDRAMINE HYDROCHLORIDE 50 MG/ML
50 INJECTION INTRAMUSCULAR; INTRAVENOUS ONCE AS NEEDED
Status: DISCONTINUED | OUTPATIENT
Start: 2022-08-01 | End: 2022-08-02 | Stop reason: HOSPADM

## 2022-08-01 RX ORDER — SODIUM CHLORIDE 0.9 % (FLUSH) 0.9 %
10 SYRINGE (ML) INJECTION
Status: DISCONTINUED | OUTPATIENT
Start: 2022-08-01 | End: 2022-08-02 | Stop reason: HOSPADM

## 2022-08-01 RX ORDER — EPINEPHRINE 0.3 MG/.3ML
0.3 INJECTION SUBCUTANEOUS ONCE AS NEEDED
Status: CANCELLED | OUTPATIENT
Start: 2022-08-01

## 2022-08-01 RX ORDER — DIPHENHYDRAMINE HYDROCHLORIDE 50 MG/ML
50 INJECTION INTRAMUSCULAR; INTRAVENOUS ONCE AS NEEDED
Status: CANCELLED | OUTPATIENT
Start: 2022-08-01

## 2022-08-01 RX ORDER — HEPARIN 100 UNIT/ML
500 SYRINGE INTRAVENOUS
Status: DISCONTINUED | OUTPATIENT
Start: 2022-08-01 | End: 2022-08-02 | Stop reason: HOSPADM

## 2022-08-01 RX ORDER — EPINEPHRINE 0.3 MG/.3ML
0.3 INJECTION SUBCUTANEOUS ONCE AS NEEDED
Status: DISCONTINUED | OUTPATIENT
Start: 2022-08-01 | End: 2022-08-02 | Stop reason: HOSPADM

## 2022-08-01 RX ORDER — FAMOTIDINE 10 MG/ML
20 INJECTION INTRAVENOUS
Status: COMPLETED | OUTPATIENT
Start: 2022-08-01 | End: 2022-08-01

## 2022-08-01 RX ORDER — SODIUM CHLORIDE 0.9 % (FLUSH) 0.9 %
10 SYRINGE (ML) INJECTION
Status: CANCELLED | OUTPATIENT
Start: 2022-08-01

## 2022-08-01 RX ORDER — HEPARIN 100 UNIT/ML
500 SYRINGE INTRAVENOUS
Status: CANCELLED | OUTPATIENT
Start: 2022-08-01

## 2022-08-01 RX ADMIN — Medication 500 UNITS: at 01:08

## 2022-08-01 RX ADMIN — SODIUM CHLORIDE: 9 INJECTION, SOLUTION INTRAVENOUS at 08:08

## 2022-08-01 RX ADMIN — PALONOSETRON 0.25 MG: 0.05 INJECTION, SOLUTION INTRAVENOUS at 09:08

## 2022-08-01 RX ADMIN — FAMOTIDINE 20 MG: 10 INJECTION, SOLUTION INTRAVENOUS at 09:08

## 2022-08-01 RX ADMIN — DIPHENHYDRAMINE HYDROCHLORIDE 50 MG: 50 INJECTION, SOLUTION INTRAMUSCULAR; INTRAVENOUS at 09:08

## 2022-08-01 RX ADMIN — PACLITAXEL 288 MG: 6 INJECTION, SOLUTION INTRAVENOUS at 09:08

## 2022-08-01 RX ADMIN — SODIUM CHLORIDE, PRESERVATIVE FREE 10 ML: 5 INJECTION INTRAVENOUS at 01:08

## 2022-08-02 ENCOUNTER — INFUSION (OUTPATIENT)
Dept: INFUSION THERAPY | Facility: HOSPITAL | Age: 62
End: 2022-08-02
Attending: INTERNAL MEDICINE
Payer: MEDICAID

## 2022-08-02 VITALS
OXYGEN SATURATION: 100 % | HEART RATE: 87 BPM | HEIGHT: 64 IN | WEIGHT: 134.06 LBS | RESPIRATION RATE: 18 BRPM | BODY MASS INDEX: 22.89 KG/M2 | TEMPERATURE: 98 F | DIASTOLIC BLOOD PRESSURE: 88 MMHG | SYSTOLIC BLOOD PRESSURE: 145 MMHG

## 2022-08-02 DIAGNOSIS — C50.911 INFILTRATING DUCTAL CARCINOMA OF RIGHT BREAST: Primary | ICD-10-CM

## 2022-08-02 PROCEDURE — 96372 THER/PROPH/DIAG INJ SC/IM: CPT

## 2022-08-02 PROCEDURE — 63600175 PHARM REV CODE 636 W HCPCS: Mod: TB | Performed by: NURSE PRACTITIONER

## 2022-08-02 RX ADMIN — PEGFILGRASTIM-CBQV 6 MG: 6 INJECTION, SOLUTION SUBCUTANEOUS at 01:08

## 2022-08-08 ENCOUNTER — OFFICE VISIT (OUTPATIENT)
Dept: HEMATOLOGY/ONCOLOGY | Facility: CLINIC | Age: 62
End: 2022-08-08
Payer: MEDICAID

## 2022-08-08 ENCOUNTER — APPOINTMENT (OUTPATIENT)
Dept: HEMATOLOGY/ONCOLOGY | Facility: CLINIC | Age: 62
End: 2022-08-08
Payer: MEDICAID

## 2022-08-08 VITALS
DIASTOLIC BLOOD PRESSURE: 88 MMHG | WEIGHT: 135.13 LBS | RESPIRATION RATE: 18 BRPM | SYSTOLIC BLOOD PRESSURE: 126 MMHG | HEIGHT: 64 IN | OXYGEN SATURATION: 100 % | HEART RATE: 102 BPM | BODY MASS INDEX: 23.07 KG/M2 | TEMPERATURE: 98 F

## 2022-08-08 DIAGNOSIS — C50.911 INFILTRATING DUCTAL CARCINOMA OF RIGHT BREAST: Primary | ICD-10-CM

## 2022-08-08 DIAGNOSIS — I10 PRIMARY HYPERTENSION: Primary | ICD-10-CM

## 2022-08-08 DIAGNOSIS — C50.911 INFILTRATING DUCTAL CARCINOMA OF RIGHT BREAST: ICD-10-CM

## 2022-08-08 LAB
ABS NEUT CALC (OHS): 30.94 X10(3)/MCL (ref 2.1–9.2)
ALBUMIN SERPL-MCNC: 3.8 GM/DL (ref 3.5–5)
ALBUMIN/GLOB SERPL: 1.3 RATIO (ref 1.1–2)
ALP SERPL-CCNC: 142 UNIT/L (ref 40–150)
ALT SERPL-CCNC: 21 UNIT/L (ref 0–55)
ANISOCYTOSIS BLD QL SMEAR: ABNORMAL
AST SERPL-CCNC: 20 UNIT/L (ref 5–34)
BASOPHILS NFR BLD MANUAL: 0.36 X10(3)/MCL (ref 0–0.2)
BASOPHILS NFR BLD MANUAL: 1 % (ref 0–2)
BILIRUBIN DIRECT+TOT PNL SERPL-MCNC: 0.3 MG/DL
BUN SERPL-MCNC: 15.3 MG/DL (ref 9.8–20.1)
BURR CELLS (OLG): ABNORMAL
CALCIUM SERPL-MCNC: 9.3 MG/DL (ref 8.4–10.2)
CHLORIDE SERPL-SCNC: 102 MMOL/L (ref 98–107)
CO2 SERPL-SCNC: 27 MMOL/L (ref 22–29)
CREAT SERPL-MCNC: 0.74 MG/DL (ref 0.55–1.02)
ERYTHROCYTE [DISTWIDTH] IN BLOOD BY AUTOMATED COUNT: 16.6 % (ref 11.5–17)
GFR SERPLBLD CREATININE-BSD FMLA CKD-EPI: >60 MLS/MIN/1.73/M2
GLOBULIN SER-MCNC: 3 GM/DL (ref 2.4–3.5)
GLUCOSE SERPL-MCNC: 105 MG/DL (ref 74–100)
HCT VFR BLD AUTO: 33.1 % (ref 37–47)
HGB BLD-MCNC: 10.4 GM/DL (ref 12–16)
IMM GRANULOCYTES # BLD AUTO: 4.53 X10(3)/MCL (ref 0–0.04)
IMM GRANULOCYTES NFR BLD AUTO: 12.4 %
LYMPHOCYTES NFR BLD MANUAL: 2.55 X10(3)/MCL
LYMPHOCYTES NFR BLD MANUAL: 7 % (ref 13–40)
MACROCYTES BLD QL SMEAR: ABNORMAL
MAGNESIUM SERPL-MCNC: 1.9 MG/DL (ref 1.6–2.6)
MCH RBC QN AUTO: 31.5 PG (ref 27–31)
MCHC RBC AUTO-ENTMCNC: 31.4 MG/DL (ref 33–36)
MCV RBC AUTO: 100.3 FL (ref 80–94)
METAMYELOCYTES NFR BLD MANUAL: 4 %
MONOCYTES NFR BLD MANUAL: 2.55 X10(3)/MCL (ref 0.1–1.3)
MONOCYTES NFR BLD MANUAL: 7 % (ref 2–11)
MYELOCYTES NFR BLD MANUAL: 3 %
NEUTROPHILS NFR BLD MANUAL: 71 % (ref 47–80)
NEUTS BAND NFR BLD MANUAL: 6 % (ref 0–11)
NRBC BLD AUTO-RTO: 0.2 %
PLATELET # BLD AUTO: 159 X10(3)/MCL (ref 130–400)
PLATELET # BLD EST: ADEQUATE 10*3/UL
PMV BLD AUTO: 10.6 FL (ref 7.4–10.4)
POIKILOCYTOSIS BLD QL SMEAR: ABNORMAL
POLYCHROMASIA BLD QL SMEAR: ABNORMAL
POTASSIUM SERPL-SCNC: 4 MMOL/L (ref 3.5–5.1)
PROMYELOCYTES # BLD MANUAL: 1 %
PROT SERPL-MCNC: 6.8 GM/DL (ref 6.4–8.3)
RBC # BLD AUTO: 3.3 X10(6)/MCL (ref 4.2–5.4)
RBC MORPH BLD: ABNORMAL
SODIUM SERPL-SCNC: 139 MMOL/L (ref 136–145)
TEAR DROP CELL (OLG): SLIGHT
WBC # SPEC AUTO: 36.4 X10(3)/MCL (ref 4.5–11.5)

## 2022-08-08 PROCEDURE — 3008F PR BODY MASS INDEX (BMI) DOCUMENTED: ICD-10-PCS | Mod: CPTII,,, | Performed by: NURSE PRACTITIONER

## 2022-08-08 PROCEDURE — 99214 OFFICE O/P EST MOD 30 MIN: CPT | Mod: S$PBB,,, | Performed by: NURSE PRACTITIONER

## 2022-08-08 PROCEDURE — 83735 ASSAY OF MAGNESIUM: CPT

## 2022-08-08 PROCEDURE — 3008F BODY MASS INDEX DOCD: CPT | Mod: CPTII,,, | Performed by: NURSE PRACTITIONER

## 2022-08-08 PROCEDURE — 80053 COMPREHEN METABOLIC PANEL: CPT

## 2022-08-08 PROCEDURE — 1159F PR MEDICATION LIST DOCUMENTED IN MEDICAL RECORD: ICD-10-PCS | Mod: CPTII,,, | Performed by: NURSE PRACTITIONER

## 2022-08-08 PROCEDURE — 3074F PR MOST RECENT SYSTOLIC BLOOD PRESSURE < 130 MM HG: ICD-10-PCS | Mod: CPTII,,, | Performed by: NURSE PRACTITIONER

## 2022-08-08 PROCEDURE — 85060 BLOOD SMEAR INTERPRETATION: CPT

## 2022-08-08 PROCEDURE — 99213 OFFICE O/P EST LOW 20 MIN: CPT | Mod: PBBFAC | Performed by: NURSE PRACTITIONER

## 2022-08-08 PROCEDURE — 85007 BL SMEAR W/DIFF WBC COUNT: CPT

## 2022-08-08 PROCEDURE — 99214 PR OFFICE/OUTPT VISIT, EST, LEVL IV, 30-39 MIN: ICD-10-PCS | Mod: S$PBB,,, | Performed by: NURSE PRACTITIONER

## 2022-08-08 PROCEDURE — 3079F PR MOST RECENT DIASTOLIC BLOOD PRESSURE 80-89 MM HG: ICD-10-PCS | Mod: CPTII,,, | Performed by: NURSE PRACTITIONER

## 2022-08-08 PROCEDURE — 85027 COMPLETE CBC AUTOMATED: CPT

## 2022-08-08 PROCEDURE — 1160F PR REVIEW ALL MEDS BY PRESCRIBER/CLIN PHARMACIST DOCUMENTED: ICD-10-PCS | Mod: CPTII,,, | Performed by: NURSE PRACTITIONER

## 2022-08-08 PROCEDURE — 36415 COLL VENOUS BLD VENIPUNCTURE: CPT

## 2022-08-08 PROCEDURE — 1160F RVW MEDS BY RX/DR IN RCRD: CPT | Mod: CPTII,,, | Performed by: NURSE PRACTITIONER

## 2022-08-08 PROCEDURE — 1159F MED LIST DOCD IN RCRD: CPT | Mod: CPTII,,, | Performed by: NURSE PRACTITIONER

## 2022-08-08 PROCEDURE — 3074F SYST BP LT 130 MM HG: CPT | Mod: CPTII,,, | Performed by: NURSE PRACTITIONER

## 2022-08-08 PROCEDURE — 3079F DIAST BP 80-89 MM HG: CPT | Mod: CPTII,,, | Performed by: NURSE PRACTITIONER

## 2022-08-08 RX ORDER — AMLODIPINE BESYLATE 10 MG/1
10 TABLET ORAL DAILY
Qty: 90 TABLET | Refills: 0 | Status: SHIPPED | OUTPATIENT
Start: 2022-08-08 | End: 2023-01-17 | Stop reason: SDUPTHER

## 2022-08-08 RX ORDER — HEPARIN 100 UNIT/ML
500 SYRINGE INTRAVENOUS
Status: CANCELLED | OUTPATIENT
Start: 2022-08-15

## 2022-08-08 RX ORDER — FAMOTIDINE 10 MG/ML
20 INJECTION INTRAVENOUS
Status: CANCELLED | OUTPATIENT
Start: 2022-08-15

## 2022-08-08 RX ORDER — EPINEPHRINE 0.3 MG/.3ML
0.3 INJECTION SUBCUTANEOUS ONCE AS NEEDED
Status: CANCELLED | OUTPATIENT
Start: 2022-08-15

## 2022-08-08 RX ORDER — DIPHENHYDRAMINE HYDROCHLORIDE 50 MG/ML
50 INJECTION INTRAMUSCULAR; INTRAVENOUS ONCE AS NEEDED
Status: CANCELLED | OUTPATIENT
Start: 2022-08-15

## 2022-08-08 RX ORDER — SODIUM CHLORIDE 0.9 % (FLUSH) 0.9 %
10 SYRINGE (ML) INJECTION
Status: CANCELLED | OUTPATIENT
Start: 2022-08-15

## 2022-08-08 NOTE — Clinical Note
Infusion Cycle 2 Taxol+labwork on 8/15/2022 Infusion Injection 8/16/2022  Follow up with np in 3 weeks on 8/29/2022 with lab work cycle 3 of Taxol

## 2022-08-08 NOTE — PROGRESS NOTES
Amlodipine 10mg refilled  Sent to [facility] pharmacy    Cookie Tamayo MD  [employer]  Resident, [facility]-3

## 2022-08-08 NOTE — PROGRESS NOTES
Problem List:  -IDC right breast, triple negative, S/P right mastectomy 2022, pT2 pN0 MX, G3, AJCC anatomic stage IIA, pathological prognostic stage IIA  -family history of prostate cancer    Current Treatment:  DD Taxol Q2 weeks x4 cycles   Started 2022    Treatment History:  AC Q2 weeks x4 cycles  (22-22)      Past medical history: Tobacco abuse.  Hypertension.  Arthritis of right shoulder.  Procedure/surgical history:   Umbilical hernia repair .  Social history: .  Lives in [place], [place].  Has 4 children.  Works as a  in a [email or web address].  Has been smoking 5 cigarettes daily for 15 years.  Drinks half a pint of liquor over the weekend.  No illicit drugs.  Family history:   2 brothers experienced prostate cancer in their 50s   Father  from prostate cancer in his 60s-70s  No family history of breast cancer.  Menstrual and OB/GYN history: Menarche, age 14.  Menopause, late 40s.  First child at age 26 years.  No abortions or miscarriages.  Used birth control pills for 8 years.  Did not use hormone replacement therapy after menopause.  Breast-fed 1 child for 7 months.    History of Present Illness:   61-year-old female referred from surgery clinic, with newly diagnosed right breast cancer.      She never palpated breast mass.  She noticed breast/chest wall pain after extensive muscle use in 2021.  At that time, she was diagnosed with musculoskeletal pain.  In January, she went back to emergency department when symptoms did not resolve.  At that time, physical examination apparently noted irregularity in breast and she was referred for further imaging and biopsy.      Interval History 2022: Patient presents today for scheduled toxicity check on Taxol of which she started yesterday. She reports tolerating chemotherapy well no significant symptoms. Patient says that she did have body aches that started right after day 2 and lasted for about 2-3 days.  Lab work reviewed with patient, stable. No further questions needs or concerns voiced.     Review of Systems: A complete 12-point ROS was performed in full with pertinent positives as described in interval history. Remainder of ROS done in full and are negative.    Lab Results   Component Value Date    WBC 36.4 (H) 08/08/2022    HGB 10.4 (L) 08/08/2022    HCT 33.1 (L) 08/08/2022    .3 (H) 08/08/2022     08/08/2022       CMP  Sodium Level   Date Value Ref Range Status   08/08/2022 139 136 - 145 mmol/L Final     Potassium Level   Date Value Ref Range Status   08/08/2022 4.0 3.5 - 5.1 mmol/L Final     Carbon Dioxide   Date Value Ref Range Status   08/08/2022 27 22 - 29 mmol/L Final     Blood Urea Nitrogen   Date Value Ref Range Status   08/08/2022 15.3 9.8 - 20.1 mg/dL Final     Creatinine   Date Value Ref Range Status   08/08/2022 0.74 0.55 - 1.02 mg/dL Final     Calcium Level Total   Date Value Ref Range Status   08/08/2022 9.3 8.4 - 10.2 mg/dL Final     Albumin Level   Date Value Ref Range Status   08/08/2022 3.8 3.5 - 5.0 gm/dL Final     Bilirubin Total   Date Value Ref Range Status   08/08/2022 0.3 <=1.5 mg/dL Final     Alkaline Phosphatase   Date Value Ref Range Status   08/08/2022 142 40 - 150 unit/L Final     Aspartate Aminotransferase   Date Value Ref Range Status   08/08/2022 20 5 - 34 unit/L Final     Alanine Aminotransferase   Date Value Ref Range Status   08/08/2022 21 0 - 55 unit/L Final     Estimated GFR-Non    Date Value Ref Range Status   04/28/2022 85 >=90        Physical Exam    Vitals & Measurements  Vitals:    08/08/22 1056   BP: 126/88   Pulse: 102   Resp: 18   Temp: 97.7 °F (36.5 °C)         General: Alert and oriented. NAD  Eye: Pupils are equal, round and reactive to light, Extraocular movements are intact. Normal conjunctiva  HENT: Normocephalic. Oropharynx exam deferred; mask in place due to coronavirus  Neck: Supple, Non-tender  Respiratory: Respirations are  non-labored, Symmetrical chest wall expansion. Breath sounds CTA bilaterally  Cardiovascular: Regular rate, rhythm, Normal peripheral perfusion, No bilateral lower extremity edema  Gastrointestinal: Non-distended, Present bowel sounds   Genitourinary: Exam deferred  Lymphatics: No lymphadenopathy appreciated  Musculoskeletal: Moves all extremities  Integumentary: Intact. Warm, dry. No rashes, or lesions to visible skin  Neurologic: No focal deficits  Psychiatric: Cooperative. Appropriate mood and affect   ECOG Performance Scale: 0 - Capable of all self-care no restrictions    Assessment:   #IDC right breast, triple negative:   -03/02/2022: Bilateral diagnostic mammogram (chest wall pain)   -03/08/2022: Needle core biopsy   -04/11/2022: Right simple mastectomy and SLN biopsy   -2.2 cm, grade 3, no LVI, margins negative, 4 lymph nodes negative (including 3 negative SLN's)   >> pT2 pN0 MX, G3, triple negative, AJCC anatomic stage IIA, pathological prognostic stage IIA  -04/28/2022: FSH level 51.03 mIU/mL (postmenopausal); estradiol level <24 pg/mL  -05/06/2022:  TTE:  LVEF 55%  -05/23/2022:  Left subclavian MediPort placed  -cycle 1 adjuvant ddAC 05/30/2022  -cycle 2 adjuvant ddAC 06/13/2022  -cycle 3 of adjuvant ddAC 06/28/2022  -cycle 4 of adjuvant ddAC 07/12/2022        Plan:  -postmenopausal (per labs 04/28/2022)      -Needs genetic testing because of triple negative breast cancer; also, family history of prostate cancer      -Has no signs and symptoms of metastatic disease, no indication of additional imaging studies      -pT2 pN0, status postmastectomy   -Does not need adjuvant radiotherapy      -Needs adjuvant chemotherapy   -We will treat with DD AC every 2 weeks x4 cycles, followed by adjuvant Taxol  >>>  -s/p adjuvant ddAC x4 (05/30/2022-07/12/2022)  >>>  -now, start dose dense Taxol, 175 mg per m2 IV q2 weeks x4 cycles, with growth factor support  -check CBC and CMP every 2 weeks before each cycle of  chemotherapy      Adjuvant chemotherapy:   1. Doxorubicin 60 mg/m² IV day 1;   2. Cyclophosphamide 600 mg/m² IV day 1;   Cycled every 14 days for 4 cycles; followed by,   3.  Paclitaxel 175 mg/m² by 3 hour IV infusion on day 1, cycled every 14 days for 4 cycles; or, paclitaxel 80 mg/m² IV 1 hour infusion weekly for 12 weeks.   All dose-dense cycles are with myeloid growth factor support.      -Consider adjuvant bisphosphonate therapy for risk reduction of distant metastasis for 3-5 years in postmenopausal patients (natural or induced) with high risk node-negative or node positive tumors   >>>   -Will offer adjuvant bisphosphonate therapy to this patient (Zometa 4 mg IV q.6 months x3-5 years)  -will need dental evaluation and preventive Dentistry prior, in order to prevent/minimize the likelihood of osteonecrosis of the jaw      -Addition of 1 year of adjuvant olaparib is an option in this patient with pT2 triple negative tumor if germline testing returns positive for BRCA1/2 mutation     Infusion clinic on 8/15/2022 for  cycle 2 Taxol with lab work (Infusion nurses to review) may proceed with treatment if lab work WNL  Follow-up with NP in 3 weeks 8/29/2022 on with lab work prior to cycle 3 of DD Taxol       Above discussed at length with her.  All questions answered.   She understands and agrees this plan, and was appreciative.

## 2022-08-12 LAB — HEMATOLOGIST REVIEW: NORMAL

## 2022-08-15 ENCOUNTER — INFUSION (OUTPATIENT)
Dept: INFUSION THERAPY | Facility: HOSPITAL | Age: 62
End: 2022-08-15
Attending: INTERNAL MEDICINE
Payer: MEDICAID

## 2022-08-15 VITALS
TEMPERATURE: 99 F | OXYGEN SATURATION: 98 % | RESPIRATION RATE: 20 BRPM | HEART RATE: 92 BPM | WEIGHT: 136.44 LBS | SYSTOLIC BLOOD PRESSURE: 133 MMHG | HEIGHT: 64 IN | DIASTOLIC BLOOD PRESSURE: 88 MMHG | BODY MASS INDEX: 23.29 KG/M2

## 2022-08-15 DIAGNOSIS — C50.911 INFILTRATING DUCTAL CARCINOMA OF RIGHT BREAST: Primary | ICD-10-CM

## 2022-08-15 DIAGNOSIS — C50.919 TRIPLE NEGATIVE MALIGNANT NEOPLASM OF BREAST: ICD-10-CM

## 2022-08-15 LAB
ALBUMIN SERPL-MCNC: 3.9 GM/DL (ref 3.5–5)
ALBUMIN/GLOB SERPL: 1.2 RATIO (ref 1.1–2)
ALP SERPL-CCNC: 112 UNIT/L (ref 40–150)
ALT SERPL-CCNC: 18 UNIT/L (ref 0–55)
AST SERPL-CCNC: 18 UNIT/L (ref 5–34)
BASOPHILS # BLD AUTO: 0.11 X10(3)/MCL (ref 0–0.2)
BASOPHILS NFR BLD AUTO: 1 %
BILIRUBIN DIRECT+TOT PNL SERPL-MCNC: 0.4 MG/DL
BUN SERPL-MCNC: 15.1 MG/DL (ref 9.8–20.1)
CALCIUM SERPL-MCNC: 9.8 MG/DL (ref 8.4–10.2)
CHLORIDE SERPL-SCNC: 108 MMOL/L (ref 98–107)
CO2 SERPL-SCNC: 27 MMOL/L (ref 22–29)
CREAT SERPL-MCNC: 0.83 MG/DL (ref 0.55–1.02)
EOSINOPHIL # BLD AUTO: 0.27 X10(3)/MCL (ref 0–0.9)
EOSINOPHIL NFR BLD AUTO: 2.5 %
ERYTHROCYTE [DISTWIDTH] IN BLOOD BY AUTOMATED COUNT: 17.1 % (ref 11.5–17)
GFR SERPLBLD CREATININE-BSD FMLA CKD-EPI: >60 MLS/MIN/1.73/M2
GLOBULIN SER-MCNC: 3.2 GM/DL (ref 2.4–3.5)
GLUCOSE SERPL-MCNC: 106 MG/DL (ref 74–100)
HCT VFR BLD AUTO: 36.2 % (ref 37–47)
HGB BLD-MCNC: 11.3 GM/DL (ref 12–16)
IMM GRANULOCYTES # BLD AUTO: 0.23 X10(3)/MCL (ref 0–0.04)
IMM GRANULOCYTES NFR BLD AUTO: 2.1 %
LYMPHOCYTES # BLD AUTO: 1.98 X10(3)/MCL (ref 0.6–4.6)
LYMPHOCYTES NFR BLD AUTO: 18.1 %
MAGNESIUM SERPL-MCNC: 2.3 MG/DL (ref 1.6–2.6)
MCH RBC QN AUTO: 31.7 PG (ref 27–31)
MCHC RBC AUTO-ENTMCNC: 31.2 MG/DL (ref 33–36)
MCV RBC AUTO: 101.7 FL (ref 80–94)
MONOCYTES # BLD AUTO: 0.83 X10(3)/MCL (ref 0.1–1.3)
MONOCYTES NFR BLD AUTO: 7.6 %
NEUTROPHILS # BLD AUTO: 7.5 X10(3)/MCL (ref 2.1–9.2)
NEUTROPHILS NFR BLD AUTO: 68.7 %
NRBC BLD AUTO-RTO: 0.3 %
PLATELET # BLD AUTO: 259 X10(3)/MCL (ref 130–400)
PMV BLD AUTO: 10 FL (ref 7.4–10.4)
POTASSIUM SERPL-SCNC: 4.5 MMOL/L (ref 3.5–5.1)
PROT SERPL-MCNC: 7.1 GM/DL (ref 6.4–8.3)
RBC # BLD AUTO: 3.56 X10(6)/MCL (ref 4.2–5.4)
SODIUM SERPL-SCNC: 144 MMOL/L (ref 136–145)
WBC # SPEC AUTO: 10.9 X10(3)/MCL (ref 4.5–11.5)

## 2022-08-15 PROCEDURE — 63600175 PHARM REV CODE 636 W HCPCS: Performed by: INTERNAL MEDICINE

## 2022-08-15 PROCEDURE — 96413 CHEMO IV INFUSION 1 HR: CPT

## 2022-08-15 PROCEDURE — 36415 COLL VENOUS BLD VENIPUNCTURE: CPT

## 2022-08-15 PROCEDURE — 63600175 PHARM REV CODE 636 W HCPCS: Performed by: NURSE PRACTITIONER

## 2022-08-15 PROCEDURE — 96375 TX/PRO/DX INJ NEW DRUG ADDON: CPT

## 2022-08-15 PROCEDURE — A4216 STERILE WATER/SALINE, 10 ML: HCPCS | Performed by: NURSE PRACTITIONER

## 2022-08-15 PROCEDURE — 25000003 PHARM REV CODE 250: Performed by: NURSE PRACTITIONER

## 2022-08-15 PROCEDURE — 96415 CHEMO IV INFUSION ADDL HR: CPT

## 2022-08-15 RX ORDER — DIPHENHYDRAMINE HYDROCHLORIDE 50 MG/ML
50 INJECTION INTRAMUSCULAR; INTRAVENOUS ONCE AS NEEDED
Status: DISCONTINUED | OUTPATIENT
Start: 2022-08-15 | End: 2022-08-15 | Stop reason: HOSPADM

## 2022-08-15 RX ORDER — EPINEPHRINE 0.3 MG/.3ML
0.3 INJECTION SUBCUTANEOUS ONCE AS NEEDED
Status: DISCONTINUED | OUTPATIENT
Start: 2022-08-15 | End: 2022-08-15 | Stop reason: HOSPADM

## 2022-08-15 RX ORDER — SODIUM CHLORIDE 0.9 % (FLUSH) 0.9 %
10 SYRINGE (ML) INJECTION
Status: DISCONTINUED | OUTPATIENT
Start: 2022-08-15 | End: 2022-08-15 | Stop reason: HOSPADM

## 2022-08-15 RX ORDER — HEPARIN 100 UNIT/ML
500 SYRINGE INTRAVENOUS
Status: DISCONTINUED | OUTPATIENT
Start: 2022-08-15 | End: 2022-08-15 | Stop reason: HOSPADM

## 2022-08-15 RX ORDER — FAMOTIDINE 10 MG/ML
20 INJECTION INTRAVENOUS
Status: COMPLETED | OUTPATIENT
Start: 2022-08-15 | End: 2022-08-15

## 2022-08-15 RX ORDER — DIPHENHYDRAMINE HYDROCHLORIDE 50 MG/ML
50 INJECTION INTRAMUSCULAR; INTRAVENOUS
Status: COMPLETED | OUTPATIENT
Start: 2022-08-15 | End: 2022-08-15

## 2022-08-15 RX ADMIN — SODIUM CHLORIDE, PRESERVATIVE FREE 10 ML: 5 INJECTION INTRAVENOUS at 12:08

## 2022-08-15 RX ADMIN — DEXAMETHASONE SODIUM PHOSPHATE 20 MG: 10 INJECTION INTRAMUSCULAR; INTRAVENOUS at 09:08

## 2022-08-15 RX ADMIN — DIPHENHYDRAMINE HYDROCHLORIDE 50 MG: 50 INJECTION INTRAMUSCULAR; INTRAVENOUS at 08:08

## 2022-08-15 RX ADMIN — SODIUM CHLORIDE: 9 INJECTION, SOLUTION INTRAVENOUS at 08:08

## 2022-08-15 RX ADMIN — PACLITAXEL 288 MG: 6 INJECTION, SOLUTION INTRAVENOUS at 09:08

## 2022-08-15 RX ADMIN — FAMOTIDINE 20 MG: 10 INJECTION, SOLUTION INTRAVENOUS at 08:08

## 2022-08-15 RX ADMIN — Medication 500 UNITS: at 12:08

## 2022-08-15 NOTE — NURSING
Pt here for C 2 DD taxol.  Pt states she had a wonderful time this weekend celebrating her mother's birthday.  Denies any pain.  Reports some constipation.

## 2022-08-16 ENCOUNTER — INFUSION (OUTPATIENT)
Dept: INFUSION THERAPY | Facility: HOSPITAL | Age: 62
End: 2022-08-16
Attending: INTERNAL MEDICINE
Payer: MEDICAID

## 2022-08-16 VITALS
RESPIRATION RATE: 20 BRPM | DIASTOLIC BLOOD PRESSURE: 98 MMHG | TEMPERATURE: 99 F | HEART RATE: 102 BPM | OXYGEN SATURATION: 100 % | SYSTOLIC BLOOD PRESSURE: 141 MMHG

## 2022-08-16 DIAGNOSIS — C50.911 INFILTRATING DUCTAL CARCINOMA OF RIGHT BREAST: Primary | ICD-10-CM

## 2022-08-16 PROCEDURE — 96372 THER/PROPH/DIAG INJ SC/IM: CPT

## 2022-08-16 PROCEDURE — 63600175 PHARM REV CODE 636 W HCPCS: Mod: TB | Performed by: NURSE PRACTITIONER

## 2022-08-16 RX ADMIN — PEGFILGRASTIM-CBQV 6 MG: 6 INJECTION, SOLUTION SUBCUTANEOUS at 01:08

## 2022-08-24 ENCOUNTER — OFFICE VISIT (OUTPATIENT)
Dept: URGENT CARE | Facility: CLINIC | Age: 62
End: 2022-08-24
Payer: MEDICAID

## 2022-08-24 VITALS
SYSTOLIC BLOOD PRESSURE: 129 MMHG | TEMPERATURE: 98 F | OXYGEN SATURATION: 99 % | HEART RATE: 89 BPM | HEIGHT: 64 IN | DIASTOLIC BLOOD PRESSURE: 84 MMHG | WEIGHT: 134.31 LBS | BODY MASS INDEX: 22.93 KG/M2 | RESPIRATION RATE: 20 BRPM

## 2022-08-24 DIAGNOSIS — Z11.52 ENCOUNTER FOR SCREENING FOR SEVERE ACUTE RESPIRATORY SYNDROME CORONAVIRUS 2 (SARS-COV-2) INFECTION: Primary | ICD-10-CM

## 2022-08-24 LAB — SARS-COV-2 RNA RESP QL NAA+PROBE: DETECTED

## 2022-08-24 PROCEDURE — 99213 OFFICE O/P EST LOW 20 MIN: CPT | Mod: S$PBB,,, | Performed by: NURSE PRACTITIONER

## 2022-08-24 PROCEDURE — 99214 OFFICE O/P EST MOD 30 MIN: CPT | Mod: PBBFAC | Performed by: NURSE PRACTITIONER

## 2022-08-24 PROCEDURE — 99213 PR OFFICE/OUTPT VISIT, EST, LEVL III, 20-29 MIN: ICD-10-PCS | Mod: S$PBB,,, | Performed by: NURSE PRACTITIONER

## 2022-08-24 PROCEDURE — 87635 SARS-COV-2 COVID-19 AMP PRB: CPT | Performed by: NURSE PRACTITIONER

## 2022-08-25 ENCOUNTER — TELEPHONE (OUTPATIENT)
Dept: URGENT CARE | Facility: CLINIC | Age: 62
End: 2022-08-25
Payer: MEDICAID

## 2022-08-25 NOTE — PROGRESS NOTES
"Subjective:       Patient ID: Cookie Ugalde is a 61 y.o. female.    Vitals:  height is 5' 3.74" (1.619 m) and weight is 60.9 kg (134 lb 4.8 oz). Her temperature is 98 °F (36.7 °C). Her blood pressure is 129/84 and her pulse is 89. Her respiration is 20 and oxygen saturation is 99%.     Chief Complaint: COVID-19 Concerns (Home positive )    Patient is a 61-year-old female, here today for body aches, cough that started on Monday.  Patient states she took the home COVID test which was positive.  States she is currently on chemo.      Constitution: Negative.   HENT: Negative.    Neck: neck negative.   Cardiovascular: Negative.    Respiratory: Positive for cough.        Objective:      Physical Exam   Constitutional: She is oriented to person, place, and time. She appears well-developed.   HENT:   Head: Normocephalic.   Ears:   Right Ear: Tympanic membrane normal.   Left Ear: Tympanic membrane normal.   Nose: Nose normal.   Eyes: Conjunctivae and EOM are normal. Pupils are equal, round, and reactive to light.   Neck: Neck supple.   Cardiovascular: Normal rate, regular rhythm and normal heart sounds.   Pulmonary/Chest: Effort normal and breath sounds normal.   Musculoskeletal: Normal range of motion.         General: Normal range of motion.   Neurological: She is alert and oriented to person, place, and time.   Skin: Skin is warm and dry.   Psychiatric: Her behavior is normal.   Vitals reviewed.        Assessment:       1. Encounter for screening for severe acute respiratory syndrome coronavirus 2 (SARS-CoV-2) infection               No results found.   Plan:         COVID PCR pending, will notify of abnormal results.  If you have symptoms, recommend home quarantine for 5 days until improvement in symptoms and fever free for 24 hours.  If any difficulty breathing, increased wheezing, shortness of breath or any new symptoms and immediately go to ER.      Encounter for screening for severe acute respiratory syndrome " coronavirus 2 (SARS-CoV-2) infection  -     COVID-19 Routine Screening

## 2022-08-29 ENCOUNTER — INFUSION (OUTPATIENT)
Dept: INFUSION THERAPY | Facility: HOSPITAL | Age: 62
End: 2022-08-29
Attending: INTERNAL MEDICINE
Payer: MEDICAID

## 2022-08-29 ENCOUNTER — OFFICE VISIT (OUTPATIENT)
Dept: HEMATOLOGY/ONCOLOGY | Facility: CLINIC | Age: 62
End: 2022-08-29
Payer: MEDICAID

## 2022-08-29 ENCOUNTER — CLINICAL SUPPORT (OUTPATIENT)
Dept: HEMATOLOGY/ONCOLOGY | Facility: CLINIC | Age: 62
End: 2022-08-29
Payer: MEDICAID

## 2022-08-29 VITALS — HEART RATE: 100 BPM | DIASTOLIC BLOOD PRESSURE: 89 MMHG | SYSTOLIC BLOOD PRESSURE: 130 MMHG

## 2022-08-29 VITALS
HEART RATE: 89 BPM | SYSTOLIC BLOOD PRESSURE: 131 MMHG | DIASTOLIC BLOOD PRESSURE: 83 MMHG | TEMPERATURE: 98 F | BODY MASS INDEX: 23.04 KG/M2 | RESPIRATION RATE: 20 BRPM | HEIGHT: 63 IN | WEIGHT: 130.06 LBS | OXYGEN SATURATION: 100 %

## 2022-08-29 DIAGNOSIS — Z51.11 ENCOUNTER FOR CHEMOTHERAPY MANAGEMENT: ICD-10-CM

## 2022-08-29 DIAGNOSIS — C50.911 INVASIVE DUCTAL CARCINOMA OF BREAST, FEMALE, RIGHT: ICD-10-CM

## 2022-08-29 DIAGNOSIS — C50.919 TRIPLE NEGATIVE MALIGNANT NEOPLASM OF BREAST: ICD-10-CM

## 2022-08-29 DIAGNOSIS — C50.911 INFILTRATING DUCTAL CARCINOMA OF RIGHT BREAST: Primary | ICD-10-CM

## 2022-08-29 LAB
ALBUMIN SERPL-MCNC: 3.9 GM/DL (ref 3.5–5)
ALBUMIN/GLOB SERPL: 1.1 RATIO (ref 1.1–2)
ALP SERPL-CCNC: 111 UNIT/L (ref 40–150)
ALT SERPL-CCNC: 14 UNIT/L (ref 0–55)
AST SERPL-CCNC: 17 UNIT/L (ref 5–34)
BASOPHILS # BLD AUTO: 0.09 X10(3)/MCL (ref 0–0.2)
BASOPHILS NFR BLD AUTO: 0.7 %
BILIRUBIN DIRECT+TOT PNL SERPL-MCNC: 0.5 MG/DL
BUN SERPL-MCNC: 15.4 MG/DL (ref 9.8–20.1)
CALCIUM SERPL-MCNC: 9.9 MG/DL (ref 8.4–10.2)
CHLORIDE SERPL-SCNC: 106 MMOL/L (ref 98–107)
CO2 SERPL-SCNC: 27 MMOL/L (ref 22–29)
CREAT SERPL-MCNC: 0.75 MG/DL (ref 0.55–1.02)
EOSINOPHIL # BLD AUTO: 0.46 X10(3)/MCL (ref 0–0.9)
EOSINOPHIL NFR BLD AUTO: 3.5 %
ERYTHROCYTE [DISTWIDTH] IN BLOOD BY AUTOMATED COUNT: 15.8 % (ref 11.5–17)
GFR SERPLBLD CREATININE-BSD FMLA CKD-EPI: >60 MLS/MIN/1.73/M2
GLOBULIN SER-MCNC: 3.6 GM/DL (ref 2.4–3.5)
GLUCOSE SERPL-MCNC: 103 MG/DL (ref 74–100)
HCT VFR BLD AUTO: 39.2 % (ref 37–47)
HGB BLD-MCNC: 12.1 GM/DL (ref 12–16)
IMM GRANULOCYTES # BLD AUTO: 0.11 X10(3)/MCL (ref 0–0.04)
IMM GRANULOCYTES NFR BLD AUTO: 0.8 %
LYMPHOCYTES # BLD AUTO: 2.27 X10(3)/MCL (ref 0.6–4.6)
LYMPHOCYTES NFR BLD AUTO: 17.3 %
MAGNESIUM SERPL-MCNC: 2.1 MG/DL (ref 1.6–2.6)
MCH RBC QN AUTO: 30.9 PG (ref 27–31)
MCHC RBC AUTO-ENTMCNC: 30.9 MG/DL (ref 33–36)
MCV RBC AUTO: 100.3 FL (ref 80–94)
MONOCYTES # BLD AUTO: 0.66 X10(3)/MCL (ref 0.1–1.3)
MONOCYTES NFR BLD AUTO: 5 %
NEUTROPHILS # BLD AUTO: 9.5 X10(3)/MCL (ref 2.1–9.2)
NEUTROPHILS NFR BLD AUTO: 72.7 %
NRBC BLD AUTO-RTO: 0 %
PLATELET # BLD AUTO: 306 X10(3)/MCL (ref 130–400)
PMV BLD AUTO: 9.4 FL (ref 7.4–10.4)
POTASSIUM SERPL-SCNC: 5.4 MMOL/L (ref 3.5–5.1)
PROT SERPL-MCNC: 7.5 GM/DL (ref 6.4–8.3)
RBC # BLD AUTO: 3.91 X10(6)/MCL (ref 4.2–5.4)
SODIUM SERPL-SCNC: 141 MMOL/L (ref 136–145)
WBC # SPEC AUTO: 13.1 X10(3)/MCL (ref 4.5–11.5)

## 2022-08-29 PROCEDURE — 3079F PR MOST RECENT DIASTOLIC BLOOD PRESSURE 80-89 MM HG: ICD-10-PCS | Mod: CPTII,,,

## 2022-08-29 PROCEDURE — 96415 CHEMO IV INFUSION ADDL HR: CPT

## 2022-08-29 PROCEDURE — 99213 OFFICE O/P EST LOW 20 MIN: CPT | Mod: PBBFAC,25

## 2022-08-29 PROCEDURE — 36415 COLL VENOUS BLD VENIPUNCTURE: CPT

## 2022-08-29 PROCEDURE — 1160F RVW MEDS BY RX/DR IN RCRD: CPT | Mod: CPTII,,,

## 2022-08-29 PROCEDURE — 3008F PR BODY MASS INDEX (BMI) DOCUMENTED: ICD-10-PCS | Mod: CPTII,,,

## 2022-08-29 PROCEDURE — 25000003 PHARM REV CODE 250

## 2022-08-29 PROCEDURE — A4216 STERILE WATER/SALINE, 10 ML: HCPCS

## 2022-08-29 PROCEDURE — 1159F PR MEDICATION LIST DOCUMENTED IN MEDICAL RECORD: ICD-10-PCS | Mod: CPTII,,,

## 2022-08-29 PROCEDURE — 63600175 PHARM REV CODE 636 W HCPCS: Performed by: INTERNAL MEDICINE

## 2022-08-29 PROCEDURE — 83735 ASSAY OF MAGNESIUM: CPT

## 2022-08-29 PROCEDURE — 3075F PR MOST RECENT SYSTOLIC BLOOD PRESS GE 130-139MM HG: ICD-10-PCS | Mod: CPTII,,,

## 2022-08-29 PROCEDURE — 1159F MED LIST DOCD IN RCRD: CPT | Mod: CPTII,,,

## 2022-08-29 PROCEDURE — 96375 TX/PRO/DX INJ NEW DRUG ADDON: CPT

## 2022-08-29 PROCEDURE — 85025 COMPLETE CBC W/AUTO DIFF WBC: CPT

## 2022-08-29 PROCEDURE — 63600175 PHARM REV CODE 636 W HCPCS

## 2022-08-29 PROCEDURE — 1160F PR REVIEW ALL MEDS BY PRESCRIBER/CLIN PHARMACIST DOCUMENTED: ICD-10-PCS | Mod: CPTII,,,

## 2022-08-29 PROCEDURE — 3008F BODY MASS INDEX DOCD: CPT | Mod: CPTII,,,

## 2022-08-29 PROCEDURE — 99214 OFFICE O/P EST MOD 30 MIN: CPT | Mod: S$PBB,,,

## 2022-08-29 PROCEDURE — 3079F DIAST BP 80-89 MM HG: CPT | Mod: CPTII,,,

## 2022-08-29 PROCEDURE — 80053 COMPREHEN METABOLIC PANEL: CPT

## 2022-08-29 PROCEDURE — 3075F SYST BP GE 130 - 139MM HG: CPT | Mod: CPTII,,,

## 2022-08-29 PROCEDURE — 99214 PR OFFICE/OUTPT VISIT, EST, LEVL IV, 30-39 MIN: ICD-10-PCS | Mod: S$PBB,,,

## 2022-08-29 PROCEDURE — 96413 CHEMO IV INFUSION 1 HR: CPT

## 2022-08-29 RX ORDER — DIPHENHYDRAMINE HYDROCHLORIDE 50 MG/ML
50 INJECTION INTRAMUSCULAR; INTRAVENOUS ONCE AS NEEDED
Status: CANCELLED | OUTPATIENT
Start: 2022-08-29

## 2022-08-29 RX ORDER — DIPHENHYDRAMINE HYDROCHLORIDE 50 MG/ML
50 INJECTION INTRAMUSCULAR; INTRAVENOUS ONCE AS NEEDED
Status: DISCONTINUED | OUTPATIENT
Start: 2022-08-29 | End: 2022-08-29 | Stop reason: HOSPADM

## 2022-08-29 RX ORDER — EPINEPHRINE 0.3 MG/.3ML
0.3 INJECTION SUBCUTANEOUS ONCE AS NEEDED
Status: DISCONTINUED | OUTPATIENT
Start: 2022-08-29 | End: 2022-08-29 | Stop reason: HOSPADM

## 2022-08-29 RX ORDER — FAMOTIDINE 10 MG/ML
20 INJECTION INTRAVENOUS
Status: CANCELLED | OUTPATIENT
Start: 2022-08-29

## 2022-08-29 RX ORDER — DIPHENHYDRAMINE HYDROCHLORIDE 50 MG/ML
50 INJECTION INTRAMUSCULAR; INTRAVENOUS
Status: CANCELLED
Start: 2022-08-29

## 2022-08-29 RX ORDER — EPINEPHRINE 0.3 MG/.3ML
0.3 INJECTION SUBCUTANEOUS ONCE AS NEEDED
Status: CANCELLED | OUTPATIENT
Start: 2022-08-29

## 2022-08-29 RX ORDER — HEPARIN 100 UNIT/ML
500 SYRINGE INTRAVENOUS
Status: CANCELLED | OUTPATIENT
Start: 2022-08-29

## 2022-08-29 RX ORDER — FAMOTIDINE 10 MG/ML
20 INJECTION INTRAVENOUS
Status: COMPLETED | OUTPATIENT
Start: 2022-08-29 | End: 2022-08-29

## 2022-08-29 RX ORDER — DIPHENHYDRAMINE HYDROCHLORIDE 50 MG/ML
50 INJECTION INTRAMUSCULAR; INTRAVENOUS
Status: COMPLETED | OUTPATIENT
Start: 2022-08-29 | End: 2022-08-29

## 2022-08-29 RX ORDER — HEPARIN 100 UNIT/ML
500 SYRINGE INTRAVENOUS
Status: DISCONTINUED | OUTPATIENT
Start: 2022-08-29 | End: 2022-08-29 | Stop reason: HOSPADM

## 2022-08-29 RX ORDER — SODIUM CHLORIDE 0.9 % (FLUSH) 0.9 %
10 SYRINGE (ML) INJECTION
Status: DISCONTINUED | OUTPATIENT
Start: 2022-08-29 | End: 2022-08-29 | Stop reason: HOSPADM

## 2022-08-29 RX ORDER — SODIUM CHLORIDE 0.9 % (FLUSH) 0.9 %
10 SYRINGE (ML) INJECTION
Status: CANCELLED | OUTPATIENT
Start: 2022-08-29

## 2022-08-29 RX ADMIN — PACLITAXEL 288 MG: 6 INJECTION, SOLUTION INTRAVENOUS at 12:08

## 2022-08-29 RX ADMIN — PALONOSETRON 0.25 MG: 0.05 INJECTION, SOLUTION INTRAVENOUS at 11:08

## 2022-08-29 RX ADMIN — FAMOTIDINE 20 MG: 10 INJECTION INTRAVENOUS at 11:08

## 2022-08-29 RX ADMIN — SODIUM CHLORIDE, PRESERVATIVE FREE 10 ML: 5 INJECTION INTRAVENOUS at 03:08

## 2022-08-29 RX ADMIN — SODIUM CHLORIDE: 9 INJECTION, SOLUTION INTRAVENOUS at 11:08

## 2022-08-29 RX ADMIN — Medication 500 UNITS: at 03:08

## 2022-08-29 RX ADMIN — DIPHENHYDRAMINE HYDROCHLORIDE 50 MG: 50 INJECTION, SOLUTION INTRAMUSCULAR; INTRAVENOUS at 11:08

## 2022-08-29 NOTE — PROGRESS NOTES
Problem List:   IDC Right Breast, Triple Negative; follow up     Past medical history: Tobacco abuse. Hypertension. Arthritis of right shoulder.    Procedure/surgical history:  Umbilical hernia repair .  Social history: . Lives in [place], [place]. Has 4 children. Works as a  in a [employer]. Has been smoking 5 cigarettes daily for 15 years. Drinks half a pint of liquor over the weekend. No illicit drugs  Menstrual and OB/GYN history: Menarche, age 14. Menopause, late 40s. First child at age 26 years. No abortions or miscarriages. Used birth control pills for 8 years. Did not use hormone replacement therapy after menopause. Breast-fed 1 child for 7 months.    Family history:  2 brothers experienced prostate cancer in their 50s  Father  from prostate cancer in his 60s-70s  No family history of breast cancer.    Current Treatment:  DDAC x q2 wks x 4 cycles; C1D1 on 22    Treatment History:  2022: Right mastectomy, right SLN biopsy    History of Present Illness:  61 -year-old female referred from surgery clinic, with newly diagnosed right breast cancer.  She never palpated breast mass. She noticed breast/chest wall pain after extensive muscle use in 2021. At that time, she was diagnosed with musculoskeletal pain. In January, she went back to emergency department when symptoms did not resolve. At that time, physical examination apparently noted irregularity in breast and she was referred for further imaging and biopsy revealing for triple negative breast cancer.     Interval history: Here today for clinic visit and alone. Cycle 3 of Taxol infusion scheduled for today.  In good spirits.  Reports darkened skin to hands and informed expected finding due to treatment.  Denies performing SBE's at any time.  Encouraged to perform monthly SBE. She is agreeable. She denies any other complaints or concerns.     ROS: Negative as otherwise stated in HPI    Physical  Exam:  Vitals:    08/29/22 1019   BP: 131/83   Pulse: 89   Resp: 20   Temp: 97.9 °F (36.6 °C)     Constitutional: No fever, chills, weight loss, weakness or fatigue  Eye: No visual disturbances or changes in vision, no double vision  ENMT: no decreased hearing, nasal congestion, nosebleeds, sore throat, taste disturbance, tinnitus, vertigo  Respiratory: No shortness of breath, cough, sputum production, hemoptysis or pleuritic type chest pain  Breast: s/p Right simple mastectomy and SLN biopsy; site healing well  Cardiovascular: No chest pain, palpitations, syncope, leg swelling  Gastrointestinal: No nausea, vomiting, diarrhea, constipation, heartburn, abdominal pain  Genitourinary: No CVA tenderness  Hematology/lymph: no abnormal bruising, hemorrhage, petechiae swollen lymph glands  Musculoskeletal: No back or neck pain, joint pain, muscle pain or decreased range of motion  Integumentary: No rash, pruritus, skin lesion or any other significant skin complaints; L chest port   Neurologic: alert and oriented x 4, no abnormal balance, confusion, numbness, tingling, headache  Psychiatric: No anxiety, depression, suicidal or homicidal ideations  Breast: post right mastectomy. No lumps or masses to left breast, bilateral axilla or right chest noted during exam  Lab Results   Component Value Date    WBC 13.1 (H) 08/29/2022    RBC 3.91 (L) 08/29/2022    HGB 12.1 08/29/2022    HCT 39.2 08/29/2022    .3 (H) 08/29/2022    MCH 30.9 08/29/2022    MCHC 30.9 (L) 08/29/2022    RDW 15.8 08/29/2022     08/29/2022    MPV 9.4 08/29/2022     CMP  Sodium Level   Date Value Ref Range Status   08/29/2022 141 136 - 145 mmol/L Final     Potassium Level   Date Value Ref Range Status   08/29/2022 5.4 (H) 3.5 - 5.1 mmol/L Final     Carbon Dioxide   Date Value Ref Range Status   08/29/2022 27 22 - 29 mmol/L Final     Blood Urea Nitrogen   Date Value Ref Range Status   08/29/2022 15.4 9.8 - 20.1 mg/dL Final     Creatinine   Date  Value Ref Range Status   08/29/2022 0.75 0.55 - 1.02 mg/dL Final     Calcium Level Total   Date Value Ref Range Status   08/29/2022 9.9 8.4 - 10.2 mg/dL Final     Albumin Level   Date Value Ref Range Status   08/29/2022 3.9 3.5 - 5.0 gm/dL Final     Bilirubin Total   Date Value Ref Range Status   08/29/2022 0.5 <=1.5 mg/dL Final     Alkaline Phosphatase   Date Value Ref Range Status   08/29/2022 111 40 - 150 unit/L Final     Aspartate Aminotransferase   Date Value Ref Range Status   08/29/2022 17 5 - 34 unit/L Final     Alanine Aminotransferase   Date Value Ref Range Status   08/29/2022 14 0 - 55 unit/L Final     Estimated GFR-Non    Date Value Ref Range Status   04/28/2022 85 >=90      Assessment:  IDC right breast, triple negative:  -03/02/2022: Bilateral diagnostic mammogram (chest wall pain)  -03/08/2022: Needle core biopsy  -04/11/2022: Right simple mastectomy and SLN biopsy  -2.2 cm, grade 3, no LVI, margins negative, 4 lymph nodes negative (including 3 negative SLN's)  >>   pT2 pN0 MX, G3, triple negative, AJCC anatomic stage IIA, pathological prognostic stage IIA      Plan:  -Assuming postmenopausal (last menstrual cycle in her late 40s)  -Serum FSH and estradiol level checked because age <60; results indicative of menopause     -Needs genetic testing because of triple negative breast cancer; also, family history of prostate cancer    -Has no signs and symptoms of metastatic disease, no indication of additional imaging studies    -pT2 pN0, status postmastectomy  -Does not need adjuvant radiotherapy    -Starting adjuvant treatment with DD AC every 2 weeks x 4 cycles, followed by adjuvant Taxol  -LVEF 55%-5/5/2022  -Mediport placed on 5/19/2022    Proposed adjuvant chemotherapy:  1. Doxorubicin 60 mg/m² IV day 1;  2. Cyclophosphamide 600 mg/m² IV day 1;  Cycled every 14 days for 4 cycles; followed by,  3. Paclitaxel 175 mg/m² by 3 hour IV infusion on day 1, cycled every 14 days for 4 cycles;  or, paclitaxel 80 mg/m² IV 1 hour infusion weekly for 12 weeks.  All dose-dense cycles are with myeloid growth factor support.    -Consider adjuvant bisphosphonate therapy for risk reduction of distant metastasis for 3-5 years in postmenopausal patients (natural or induced) with high risk node-negative or node positive tumors  >>>   -Addition of 1 year of adjuvant olaparib is an option in this patient with pT2 triple negative tumor if germline testing returns positive for BRCA1/2 mutation    Offered bisphosphonate therapy and is agreeable, instructed will need dental clearance to initiate and she verbalized understanding.  Instructed to perform monthly SBE and is agreeable    Ok, to treat with Taxol cycle #3 today and return tomorrow for Neulasta  Labs, NP visit and Taxol cycle #4 treatment scheduled for 9/12/22  Neulasta injection to follow 9/13/22  Mammo scheduled for 10/21/22

## 2022-08-29 NOTE — NURSING
Pt did labwork, saw provider, and is here for C 3 taxol.  Pt had covid but is ok per Viv Seymour NP for chemo today.  Pt denies any pain or complaints.

## 2022-08-29 NOTE — PLAN OF CARE
Labs WNL, C 3 taxol, cont to monitor labs, pt ok to get chemo today after testing positive for covid and completing quarentine

## 2022-08-30 ENCOUNTER — INFUSION (OUTPATIENT)
Dept: INFUSION THERAPY | Facility: HOSPITAL | Age: 62
End: 2022-08-30
Attending: INTERNAL MEDICINE
Payer: MEDICAID

## 2022-08-30 VITALS
HEART RATE: 105 BPM | OXYGEN SATURATION: 98 % | TEMPERATURE: 99 F | SYSTOLIC BLOOD PRESSURE: 129 MMHG | RESPIRATION RATE: 18 BRPM | DIASTOLIC BLOOD PRESSURE: 92 MMHG

## 2022-08-30 DIAGNOSIS — C50.911 INFILTRATING DUCTAL CARCINOMA OF RIGHT BREAST: Primary | ICD-10-CM

## 2022-08-30 PROCEDURE — 63600175 PHARM REV CODE 636 W HCPCS: Mod: TB

## 2022-08-30 RX ADMIN — PEGFILGRASTIM-CBQV 6 MG: 6 INJECTION, SOLUTION SUBCUTANEOUS at 02:08

## 2022-08-31 NOTE — PROGRESS NOTES
Please see nursing note of the same day 08/29/2022.    This line is being dictated in order to close the chart.

## 2022-09-02 ENCOUNTER — TELEPHONE (OUTPATIENT)
Dept: FAMILY MEDICINE | Facility: CLINIC | Age: 62
End: 2022-09-02
Payer: MEDICAID

## 2022-09-02 NOTE — TELEPHONE ENCOUNTER
Pt's Potassium was 5.4 as of 8/19/22, ordered by Dr. Su. Pt states that she has no symptoms.  Notified Dr. Su and no recommendations at this time.  She is undergoing chemotherapy.

## 2022-09-12 ENCOUNTER — OFFICE VISIT (OUTPATIENT)
Dept: HEMATOLOGY/ONCOLOGY | Facility: CLINIC | Age: 62
End: 2022-09-12
Payer: MEDICAID

## 2022-09-12 ENCOUNTER — INFUSION (OUTPATIENT)
Dept: INFUSION THERAPY | Facility: HOSPITAL | Age: 62
End: 2022-09-12
Attending: INTERNAL MEDICINE
Payer: MEDICAID

## 2022-09-12 VITALS
SYSTOLIC BLOOD PRESSURE: 129 MMHG | OXYGEN SATURATION: 99 % | WEIGHT: 133 LBS | HEART RATE: 90 BPM | DIASTOLIC BLOOD PRESSURE: 84 MMHG | HEIGHT: 63 IN | BODY MASS INDEX: 23.57 KG/M2 | RESPIRATION RATE: 20 BRPM | TEMPERATURE: 98 F

## 2022-09-12 DIAGNOSIS — C50.911 INFILTRATING DUCTAL CARCINOMA OF RIGHT BREAST: Primary | ICD-10-CM

## 2022-09-12 DIAGNOSIS — C50.919 TRIPLE NEGATIVE MALIGNANT NEOPLASM OF BREAST: Primary | ICD-10-CM

## 2022-09-12 PROCEDURE — 3079F PR MOST RECENT DIASTOLIC BLOOD PRESSURE 80-89 MM HG: ICD-10-PCS | Mod: CPTII,,, | Performed by: NURSE PRACTITIONER

## 2022-09-12 PROCEDURE — 96413 CHEMO IV INFUSION 1 HR: CPT

## 2022-09-12 PROCEDURE — 3074F SYST BP LT 130 MM HG: CPT | Mod: CPTII,,, | Performed by: NURSE PRACTITIONER

## 2022-09-12 PROCEDURE — 96375 TX/PRO/DX INJ NEW DRUG ADDON: CPT

## 2022-09-12 PROCEDURE — 3074F PR MOST RECENT SYSTOLIC BLOOD PRESSURE < 130 MM HG: ICD-10-PCS | Mod: CPTII,,, | Performed by: NURSE PRACTITIONER

## 2022-09-12 PROCEDURE — 25000003 PHARM REV CODE 250: Performed by: NURSE PRACTITIONER

## 2022-09-12 PROCEDURE — 3079F DIAST BP 80-89 MM HG: CPT | Mod: CPTII,,, | Performed by: NURSE PRACTITIONER

## 2022-09-12 PROCEDURE — A4216 STERILE WATER/SALINE, 10 ML: HCPCS | Performed by: NURSE PRACTITIONER

## 2022-09-12 PROCEDURE — 99213 OFFICE O/P EST LOW 20 MIN: CPT | Mod: PBBFAC | Performed by: NURSE PRACTITIONER

## 2022-09-12 PROCEDURE — 1160F PR REVIEW ALL MEDS BY PRESCRIBER/CLIN PHARMACIST DOCUMENTED: ICD-10-PCS | Mod: CPTII,,, | Performed by: NURSE PRACTITIONER

## 2022-09-12 PROCEDURE — 3008F PR BODY MASS INDEX (BMI) DOCUMENTED: ICD-10-PCS | Mod: CPTII,,, | Performed by: NURSE PRACTITIONER

## 2022-09-12 PROCEDURE — 63600175 PHARM REV CODE 636 W HCPCS: Performed by: NURSE PRACTITIONER

## 2022-09-12 PROCEDURE — 1160F RVW MEDS BY RX/DR IN RCRD: CPT | Mod: CPTII,,, | Performed by: NURSE PRACTITIONER

## 2022-09-12 PROCEDURE — 99214 PR OFFICE/OUTPT VISIT, EST, LEVL IV, 30-39 MIN: ICD-10-PCS | Mod: S$PBB,,, | Performed by: NURSE PRACTITIONER

## 2022-09-12 PROCEDURE — 3008F BODY MASS INDEX DOCD: CPT | Mod: CPTII,,, | Performed by: NURSE PRACTITIONER

## 2022-09-12 PROCEDURE — 1159F PR MEDICATION LIST DOCUMENTED IN MEDICAL RECORD: ICD-10-PCS | Mod: CPTII,,, | Performed by: NURSE PRACTITIONER

## 2022-09-12 PROCEDURE — 96365 THER/PROPH/DIAG IV INF INIT: CPT

## 2022-09-12 PROCEDURE — 99214 OFFICE O/P EST MOD 30 MIN: CPT | Mod: S$PBB,,, | Performed by: NURSE PRACTITIONER

## 2022-09-12 PROCEDURE — 96415 CHEMO IV INFUSION ADDL HR: CPT

## 2022-09-12 PROCEDURE — 1159F MED LIST DOCD IN RCRD: CPT | Mod: CPTII,,, | Performed by: NURSE PRACTITIONER

## 2022-09-12 RX ORDER — SODIUM CHLORIDE 0.9 % (FLUSH) 0.9 %
10 SYRINGE (ML) INJECTION
Status: DISCONTINUED | OUTPATIENT
Start: 2022-09-12 | End: 2022-09-12 | Stop reason: HOSPADM

## 2022-09-12 RX ORDER — DIPHENHYDRAMINE HYDROCHLORIDE 50 MG/ML
50 INJECTION INTRAMUSCULAR; INTRAVENOUS
Status: COMPLETED | OUTPATIENT
Start: 2022-09-12 | End: 2022-09-12

## 2022-09-12 RX ORDER — EPINEPHRINE 0.3 MG/.3ML
0.3 INJECTION SUBCUTANEOUS ONCE AS NEEDED
Status: CANCELLED | OUTPATIENT
Start: 2022-09-12

## 2022-09-12 RX ORDER — EPINEPHRINE 0.1 MG/ML
0.3 INJECTION INTRAVENOUS ONCE AS NEEDED
Status: DISCONTINUED | OUTPATIENT
Start: 2022-09-12 | End: 2022-09-12 | Stop reason: HOSPADM

## 2022-09-12 RX ORDER — DIPHENHYDRAMINE HYDROCHLORIDE 50 MG/ML
50 INJECTION INTRAMUSCULAR; INTRAVENOUS
Status: CANCELLED
Start: 2022-09-12

## 2022-09-12 RX ORDER — DIPHENHYDRAMINE HYDROCHLORIDE 50 MG/ML
50 INJECTION INTRAMUSCULAR; INTRAVENOUS ONCE AS NEEDED
Status: CANCELLED | OUTPATIENT
Start: 2022-09-12

## 2022-09-12 RX ORDER — FAMOTIDINE 10 MG/ML
20 INJECTION INTRAVENOUS
Status: CANCELLED | OUTPATIENT
Start: 2022-09-12

## 2022-09-12 RX ORDER — FAMOTIDINE 10 MG/ML
20 INJECTION INTRAVENOUS
Status: COMPLETED | OUTPATIENT
Start: 2022-09-12 | End: 2022-09-12

## 2022-09-12 RX ORDER — DIPHENHYDRAMINE HYDROCHLORIDE 50 MG/ML
50 INJECTION INTRAMUSCULAR; INTRAVENOUS ONCE AS NEEDED
Status: DISCONTINUED | OUTPATIENT
Start: 2022-09-12 | End: 2022-09-12 | Stop reason: HOSPADM

## 2022-09-12 RX ORDER — HEPARIN 100 UNIT/ML
500 SYRINGE INTRAVENOUS
Status: CANCELLED | OUTPATIENT
Start: 2022-09-12

## 2022-09-12 RX ORDER — HEPARIN 100 UNIT/ML
500 SYRINGE INTRAVENOUS
Status: DISCONTINUED | OUTPATIENT
Start: 2022-09-12 | End: 2022-09-12 | Stop reason: HOSPADM

## 2022-09-12 RX ORDER — SODIUM CHLORIDE 0.9 % (FLUSH) 0.9 %
10 SYRINGE (ML) INJECTION
Status: CANCELLED | OUTPATIENT
Start: 2022-09-12

## 2022-09-12 RX ADMIN — DEXAMETHASONE SODIUM PHOSPHATE 20 MG: 10 INJECTION INTRAMUSCULAR; INTRAVENOUS at 10:09

## 2022-09-12 RX ADMIN — DIPHENHYDRAMINE HYDROCHLORIDE 50 MG: 50 INJECTION, SOLUTION INTRAMUSCULAR; INTRAVENOUS at 10:09

## 2022-09-12 RX ADMIN — PACLITAXEL 288 MG: 6 INJECTION, SOLUTION INTRAVENOUS at 11:09

## 2022-09-12 RX ADMIN — FAMOTIDINE 20 MG: 10 INJECTION INTRAVENOUS at 10:09

## 2022-09-12 RX ADMIN — SODIUM CHLORIDE: 9 INJECTION, SOLUTION INTRAVENOUS at 10:09

## 2022-09-12 RX ADMIN — Medication 10 ML: at 02:09

## 2022-09-12 RX ADMIN — Medication 500 UNITS: at 02:09

## 2022-09-12 NOTE — LETTER
September 12, 2022      [facility]  [address] [address]  Phone: [phone]              Patient: Cookie Ugalde  YOB: 1960  Date of Visit: 09/12/2022    To Whom It May Concern:    Cookie Ugalde was at [facility] on 09/12/2022. She may return to work/school on 9/19/2022  with no restrictions. If you have any questions or concerns, or if I can be of further assistance, please do not hesitate to contact my office.    Sincerely,    ULICES Pena

## 2022-09-12 NOTE — Clinical Note
Infusion today for Taxol cycle 4  Infusion tomorrow 9/13/2022 Fulphia injection  Follow up with  + lab work

## 2022-09-12 NOTE — PROGRESS NOTES
Problem List:   IDC Right Breast, Triple Negative; follow up     Current Treatment:  DD Taxol Q2 weeks x4 cycles   Started 2022    Treatment History:  -Mediport placed on 2022  -AC x4 cycles (2022-2022)    Past medical history: Tobacco abuse. Hypertension. Arthritis of right shoulder.  Procedure/surgical history:  Umbilical hernia repair .  Social history: . Lives in [place] [id number]. Has 4 children. Works as a  in a [employer]. Has been smoking 5 cigarettes daily for 15 years. Drinks half a pint of liquor over the weekend. No illicit drugs  Menstrual and OB/GYN history: Menarche, age 14. Menopause, late 40s. First child at age 26 years. No abortions or miscarriages. Used birth control pills for 8 years. Did not use hormone replacement therapy after menopause. Breast-fed 1 child for 7 months.    Family history:  2 brothers experienced prostate cancer in their 50s  Father  from prostate cancer in his 60s-70s  No family history of breast cancer.    Current Treatment:  DDAC x q2 wks x 4 cycles; C1D1 on 22    Treatment History:  2022: Right mastectomy, right SLN biopsy    History of Present Illness:  61 -year-old female referred from surgery clinic, with newly diagnosed right breast cancer.  She never palpated breast mass. She noticed breast/chest wall pain after extensive muscle use in 2021. At that time, she was diagnosed with musculoskeletal pain. In January, she went back to emergency department when symptoms did not resolve. At that time, physical examination apparently noted irregularity in breast and she was referred for further imaging and biopsy revealing for triple negative breast cancer.     Interval history 2022: Patient presents today alone for scheduled follow up for breast cancer. She is due to receive cycle 4 last cycle of Taxol today. She is aware that this is her last cycle and anticipating completion. Patient dental  clearance still pending at this time, however she has an appointment set up for Oct 2022 at this time patient will get clearance. Patient reports tolerating well good appetite and energy level. She reports itching hands and feet. She denies any N/V/D/C, worsening neuropathy symptoms, bone pain, lymphedema, left breast concerns, dyspnea, etc..Patient also says that she was fitted for mastectomy bra, awaiting shipment. She has not been taking steroid before each treatment. We will ensure that patient receives 20mg of Dexamethasone IV today. Lab work reviewed with patient, stable for treatment. Medications reviewed, she denies the need for any refills at this time. No further questions needs or concerns voiced.     Review of Systems: Negative as otherwise stated in HPI    Physical Exam:  Vitals:    09/12/22 0852   BP: 129/84   Pulse: 90   Resp: 20   Temp: 98 °F (36.7 °C)     Constitutional: No fever, chills, weight loss, weakness or fatigue  Eye: No visual disturbances or changes in vision, no double vision  ENMT: no decreased hearing, nasal congestion, nosebleeds, sore throat, taste disturbance, tinnitus, vertigo  Respiratory: No shortness of breath, cough, sputum production, hemoptysis or pleuritic type chest pain  Breast: s/p Right simple mastectomy and SLN biopsy; site healing well  Cardiovascular: No chest pain, palpitations, syncope, leg swelling  Gastrointestinal: No nausea, vomiting, diarrhea, constipation, heartburn, abdominal pain  Genitourinary: No CVA tenderness  Hematology/lymph: no abnormal bruising, hemorrhage, petechiae swollen lymph glands  Musculoskeletal: No back or neck pain, joint pain, muscle pain or decreased range of motion  Integumentary: No rash, pruritus, skin lesion or any other significant skin complaints; L chest port   Neurologic: alert and oriented x 4, no abnormal balance, confusion, numbness, tingling, headache  Psychiatric: No anxiety, depression, suicidal or homicidal  ideations  Breast: post right mastectomy. No lumps or masses to left breast, bilateral axilla or right chest noted during exam      Lab Results   Component Value Date    WBC 14.3 (H) 09/12/2022    RBC 3.36 (L) 09/12/2022    HGB 10.7 (L) 09/12/2022    HCT 33.9 (L) 09/12/2022    .9 (H) 09/12/2022    MCH 31.8 (H) 09/12/2022    MCHC 31.6 (L) 09/12/2022    RDW 15.2 09/12/2022     09/12/2022    MPV 9.6 09/12/2022     CMP  Sodium Level   Date Value Ref Range Status   09/12/2022 139 136 - 145 mmol/L Final     Potassium Level   Date Value Ref Range Status   09/12/2022 3.8 3.5 - 5.1 mmol/L Final     Carbon Dioxide   Date Value Ref Range Status   09/12/2022 27 22 - 29 mmol/L Final     Blood Urea Nitrogen   Date Value Ref Range Status   09/12/2022 15.1 9.8 - 20.1 mg/dL Final     Creatinine   Date Value Ref Range Status   09/12/2022 0.81 0.55 - 1.02 mg/dL Final     Calcium Level Total   Date Value Ref Range Status   09/12/2022 9.3 8.4 - 10.2 mg/dL Final     Albumin Level   Date Value Ref Range Status   09/12/2022 3.7 3.5 - 5.0 gm/dL Final     Bilirubin Total   Date Value Ref Range Status   09/12/2022 0.3 <=1.5 mg/dL Final     Alkaline Phosphatase   Date Value Ref Range Status   09/12/2022 119 40 - 150 unit/L Final     Aspartate Aminotransferase   Date Value Ref Range Status   09/12/2022 15 5 - 34 unit/L Final     Alanine Aminotransferase   Date Value Ref Range Status   09/12/2022 11 0 - 55 unit/L Final     Estimated GFR-Non    Date Value Ref Range Status   04/28/2022 85 >=90      Assessment:  IDC right breast, triple negative:  -03/02/2022: Bilateral diagnostic mammogram (chest wall pain)  -03/08/2022: Needle core biopsy  -04/11/2022: Right simple mastectomy and SLN biopsy  -2.2 cm, grade 3, no LVI, margins negative, 4 lymph nodes negative (including 3 negative SLN's)  >>   pT2 pN0 MX, G3, triple negative, AJCC anatomic stage IIA, pathological prognostic stage IIA    -5/5/2022-LVEF  55%    Plan:  -Assuming postmenopausal (last menstrual cycle in her late 40s)  -Serum FSH and estradiol level checked because age <60; results indicative of menopause     -Needs genetic testing because of triple negative breast cancer; also, family history of prostate cancer- results pending    -Has no signs and symptoms of metastatic disease, no indication of additional imaging studies    -pT2 pN0, status postmastectomy  -Does not need adjuvant radiotherapy    Proposed adjuvant chemotherapy:  1. Doxorubicin 60 mg/m² IV day 1;  2. Cyclophosphamide 600 mg/m² IV day 1;  Cycled every 14 days for 4 cycles; followed by,  3. Paclitaxel 175 mg/m² by 3 hour IV infusion on day 1, cycled every 14 days for 4 cycles; or, paclitaxel 80 mg/m² IV 1 hour infusion weekly for 12 weeks.  All dose-dense cycles are with myeloid growth factor support.    -Consider adjuvant bisphosphonate therapy for risk reduction of distant metastasis for 3-5 years in postmenopausal patients (natural or induced) with high risk node-negative or node positive tumors  >>>   -Addition of 1 year of adjuvant olaparib is an option in this patient with pT2 triple negative tumor if germline testing returns positive for BRCA1/2 mutation    Offered bisphosphonate therapy and is agreeable, instructed will need dental clearance to initiate and she verbalized understanding.  Instructed to perform monthly SBE and is agreeable    Okay to proceed with cycle 4 of DD Taxol today   Return to infusion clinic for GH injection today  Follow up with  in 2 weeks completion of adjuvant chemotherapy  Mammo scheduled for 10/21/22

## 2022-09-13 ENCOUNTER — INFUSION (OUTPATIENT)
Dept: INFUSION THERAPY | Facility: HOSPITAL | Age: 62
End: 2022-09-13
Attending: INTERNAL MEDICINE
Payer: MEDICAID

## 2022-09-13 ENCOUNTER — OFFICE VISIT (OUTPATIENT)
Dept: FAMILY MEDICINE | Facility: CLINIC | Age: 62
End: 2022-09-13
Payer: MEDICAID

## 2022-09-13 VITALS — BODY MASS INDEX: 23.68 KG/M2 | WEIGHT: 133.63 LBS | HEIGHT: 63 IN

## 2022-09-13 VITALS
HEART RATE: 96 BPM | BODY MASS INDEX: 23.68 KG/M2 | DIASTOLIC BLOOD PRESSURE: 87 MMHG | RESPIRATION RATE: 18 BRPM | WEIGHT: 133.63 LBS | SYSTOLIC BLOOD PRESSURE: 133 MMHG | HEIGHT: 63 IN | TEMPERATURE: 98 F | OXYGEN SATURATION: 100 %

## 2022-09-13 DIAGNOSIS — Z13.220 SCREENING, LIPID: ICD-10-CM

## 2022-09-13 DIAGNOSIS — C50.911 INFILTRATING DUCTAL CARCINOMA OF RIGHT BREAST: Primary | ICD-10-CM

## 2022-09-13 DIAGNOSIS — Z12.4 CERVICAL CANCER SCREENING: ICD-10-CM

## 2022-09-13 DIAGNOSIS — C50.919 TRIPLE NEGATIVE MALIGNANT NEOPLASM OF BREAST: ICD-10-CM

## 2022-09-13 DIAGNOSIS — I10 PRIMARY HYPERTENSION: ICD-10-CM

## 2022-09-13 DIAGNOSIS — Z11.59 ENCOUNTER FOR HEPATITIS C SCREENING TEST FOR LOW RISK PATIENT: ICD-10-CM

## 2022-09-13 DIAGNOSIS — Z13.1 SCREENING FOR DIABETES MELLITUS: ICD-10-CM

## 2022-09-13 LAB
ALBUMIN SERPL-MCNC: 4.1 GM/DL (ref 3.5–5)
ALBUMIN/GLOB SERPL: 1 RATIO (ref 1.1–2)
ALP SERPL-CCNC: 129 UNIT/L (ref 40–150)
ALT SERPL-CCNC: 14 UNIT/L (ref 0–55)
AST SERPL-CCNC: 22 UNIT/L (ref 5–34)
BASOPHILS # BLD AUTO: 0.03 X10(3)/MCL (ref 0–0.2)
BASOPHILS NFR BLD AUTO: 0.2 %
BILIRUB SERPL-MCNC: NEGATIVE MG/DL
BILIRUBIN DIRECT+TOT PNL SERPL-MCNC: 0.4 MG/DL
BLOOD URINE, POC: NORMAL
BUN SERPL-MCNC: 16.1 MG/DL (ref 9.8–20.1)
CALCIUM SERPL-MCNC: 10 MG/DL (ref 8.4–10.2)
CHLORIDE SERPL-SCNC: 101 MMOL/L (ref 98–107)
CHOLEST SERPL-MCNC: 250 MG/DL
CHOLEST/HDLC SERPL: 6 {RATIO} (ref 0–5)
CO2 SERPL-SCNC: 27 MMOL/L (ref 22–29)
COLOR, POC UA: YELLOW
CREAT SERPL-MCNC: 0.73 MG/DL (ref 0.55–1.02)
EOSINOPHIL # BLD AUTO: 0.01 X10(3)/MCL (ref 0–0.9)
EOSINOPHIL NFR BLD AUTO: 0.1 %
ERYTHROCYTE [DISTWIDTH] IN BLOOD BY AUTOMATED COUNT: 14.8 % (ref 11.5–17)
EST. AVERAGE GLUCOSE BLD GHB EST-MCNC: 99.7 MG/DL
GFR SERPLBLD CREATININE-BSD FMLA CKD-EPI: >60 MLS/MIN/1.73/M2
GLOBULIN SER-MCNC: 4.1 GM/DL (ref 2.4–3.5)
GLUCOSE SERPL-MCNC: 94 MG/DL (ref 74–100)
GLUCOSE UR QL STRIP: NEGATIVE
HBA1C MFR BLD: 5.1 %
HCT VFR BLD AUTO: 35.1 % (ref 37–47)
HCV AB SERPL QL IA: NONREACTIVE
HDLC SERPL-MCNC: 45 MG/DL (ref 35–60)
HGB BLD-MCNC: 11.4 GM/DL (ref 12–16)
IMM GRANULOCYTES # BLD AUTO: 0.81 X10(3)/MCL (ref 0–0.04)
IMM GRANULOCYTES NFR BLD AUTO: 4.3 %
KETONES UR QL STRIP: NEGATIVE
LDLC SERPL CALC-MCNC: 184 MG/DL (ref 50–140)
LEUKOCYTE ESTERASE URINE, POC: NEGATIVE
LYMPHOCYTES # BLD AUTO: 1.23 X10(3)/MCL (ref 0.6–4.6)
LYMPHOCYTES NFR BLD AUTO: 6.6 %
MAGNESIUM SERPL-MCNC: 2.3 MG/DL (ref 1.6–2.6)
MCH RBC QN AUTO: 31.5 PG (ref 27–31)
MCHC RBC AUTO-ENTMCNC: 32.5 MG/DL (ref 33–36)
MCV RBC AUTO: 97 FL (ref 80–94)
MONOCYTES # BLD AUTO: 0.53 X10(3)/MCL (ref 0.1–1.3)
MONOCYTES NFR BLD AUTO: 2.8 %
NEUTROPHILS # BLD AUTO: 16.1 X10(3)/MCL (ref 2.1–9.2)
NEUTROPHILS NFR BLD AUTO: 86 %
NITRITE, POC UA: NEGATIVE
NRBC BLD AUTO-RTO: 0 %
PH, POC UA: 6.5
PLATELET # BLD AUTO: 415 X10(3)/MCL (ref 130–400)
PMV BLD AUTO: 10.4 FL (ref 7.4–10.4)
POTASSIUM SERPL-SCNC: 4 MMOL/L (ref 3.5–5.1)
PROT SERPL-MCNC: 8.2 GM/DL (ref 6.4–8.3)
PROTEIN, POC: NEGATIVE
RBC # BLD AUTO: 3.62 X10(6)/MCL (ref 4.2–5.4)
SODIUM SERPL-SCNC: 140 MMOL/L (ref 136–145)
SPECIFIC GRAVITY, POC UA: 1.02
TRIGL SERPL-MCNC: 105 MG/DL (ref 37–140)
UROBILINOGEN, POC UA: 1
VLDLC SERPL CALC-MCNC: 21 MG/DL
WBC # SPEC AUTO: 18.7 X10(3)/MCL (ref 4.5–11.5)

## 2022-09-13 PROCEDURE — 86803 HEPATITIS C AB TEST: CPT

## 2022-09-13 PROCEDURE — 36415 COLL VENOUS BLD VENIPUNCTURE: CPT

## 2022-09-13 PROCEDURE — 85025 COMPLETE CBC W/AUTO DIFF WBC: CPT

## 2022-09-13 PROCEDURE — 81001 URINALYSIS AUTO W/SCOPE: CPT | Mod: PBBFAC | Performed by: NURSE PRACTITIONER

## 2022-09-13 PROCEDURE — 99214 OFFICE O/P EST MOD 30 MIN: CPT | Mod: PBBFAC,59 | Performed by: NURSE PRACTITIONER

## 2022-09-13 PROCEDURE — 63600175 PHARM REV CODE 636 W HCPCS: Mod: TB | Performed by: NURSE PRACTITIONER

## 2022-09-13 PROCEDURE — 87624 HPV HI-RISK TYP POOLED RSLT: CPT

## 2022-09-13 PROCEDURE — 83036 HEMOGLOBIN GLYCOSYLATED A1C: CPT

## 2022-09-13 PROCEDURE — 80053 COMPREHEN METABOLIC PANEL: CPT

## 2022-09-13 PROCEDURE — 83735 ASSAY OF MAGNESIUM: CPT

## 2022-09-13 PROCEDURE — 96372 THER/PROPH/DIAG INJ SC/IM: CPT

## 2022-09-13 PROCEDURE — 80061 LIPID PANEL: CPT

## 2022-09-13 RX ADMIN — PEGFILGRASTIM-CBQV 6 MG: 6 INJECTION, SOLUTION SUBCUTANEOUS at 03:09

## 2022-09-13 NOTE — PROGRESS NOTES
Family Medicine Clinic Note:    PCP: Georgina Lang MD    Cookie Ugalde is a 61 y.o. female presents to clinic today for cc: routine visit regarding breast cancer     Subjective:   Intraductal Carcinoma of Right Breast, Triple Negative  -Competed the chemo on 9/12/2022, after dental clearance to start alendronate to prevent mets   -Followed by Dr. Valle  -Labs done 9/12/22: Hgb 10.7/Hct 33.9, CMP reviewed Potassium now normal range (was 5.4) --Dr. Valle was notified and no intervention required  -Pt is post menopausal  -Last Pap unable to recall states greater than 5 years      HTN  -On amlodipine 10 mg daily and HCTZ 12.5 mg daily   -Bp today 133/87  -Denies cp, sob, palpitations, dizziness or syncopal or presyncopal episodes  -No Refills requested   -Quit tobacco use 6 months ago. Was smoking 1 ppd x 20 years    HM:  Needs a Pap today  Mammogram set for October 21, 2022  Needs colonoscopy     ROS  Constitutional: No fevers, chills or fatigue  Respiratory: no trouble breathing or SOB  Cardiovascular: no chest pain or tightness, no SOB on activity, no ankle swelling  Gastrointestinal: no constipation, no diarrhea, no nausea, no vomiting    Current Outpatient Medications   Medication Sig Dispense Refill    amLODIPine (NORVASC) 10 MG tablet Take 1 tablet (10 mg total) by mouth once daily. 90 tablet 0    hydroCHLOROthiazide (MICROZIDE) 12.5 mg capsule Take 1 capsule (12.5 mg total) by mouth once daily. 90 capsule 0     No current facility-administered medications for this visit.       Objective:     There were no vitals taken for this visit.  BP Readings from Last 3 Encounters:   09/12/22 129/84   08/30/22 (!) 129/92   08/29/22 130/89     Wt Readings from Last 3 Encounters:   09/12/22 60.3 kg (133 lb)   08/29/22 59 kg (130 lb 1.1 oz)   08/24/22 60.9 kg (134 lb 4.8 oz)     Recent Results (from the past 200 hour(s))   Comprehensive Metabolic Panel    Collection Time: 09/12/22  8:03 AM   Result  Value Ref Range    Sodium Level 139 136 - 145 mmol/L    Potassium Level 3.8 3.5 - 5.1 mmol/L    Chloride 103 98 - 107 mmol/L    Carbon Dioxide 27 22 - 29 mmol/L    Glucose Level 106 (H) 74 - 100 mg/dL    Blood Urea Nitrogen 15.1 9.8 - 20.1 mg/dL    Creatinine 0.81 0.55 - 1.02 mg/dL    Calcium Level Total 9.3 8.4 - 10.2 mg/dL    Protein Total 7.5 6.4 - 8.3 gm/dL    Albumin Level 3.7 3.5 - 5.0 gm/dL    Globulin 3.8 (H) 2.4 - 3.5 gm/dL    Albumin/Globulin Ratio 1.0 (L) 1.1 - 2.0 ratio    Bilirubin Total 0.3 <=1.5 mg/dL    Alkaline Phosphatase 119 40 - 150 unit/L    Alanine Aminotransferase 11 0 - 55 unit/L    Aspartate Aminotransferase 15 5 - 34 unit/L    eGFR >60 mls/min/1.73/m2   Magnesium    Collection Time: 09/12/22  8:03 AM   Result Value Ref Range    Magnesium Level 2.20 1.60 - 2.60 mg/dL   CBC with Differential    Collection Time: 09/12/22  8:03 AM   Result Value Ref Range    WBC 14.3 (H) 4.5 - 11.5 x10(3)/mcL    RBC 3.36 (L) 4.20 - 5.40 x10(6)/mcL    Hgb 10.7 (L) 12.0 - 16.0 gm/dL    Hct 33.9 (L) 37.0 - 47.0 %    .9 (H) 80.0 - 94.0 fL    MCH 31.8 (H) 27.0 - 31.0 pg    MCHC 31.6 (L) 33.0 - 36.0 mg/dL    RDW 15.2 11.5 - 17.0 %    Platelet 341 130 - 400 x10(3)/mcL    MPV 9.6 7.4 - 10.4 fL    Neut % 74.3 %    Lymph % 11.6 %    Mono % 6.1 %    Eos % 5.0 %    Basophil % 1.0 %    Lymph # 1.66 0.6 - 4.6 x10(3)/mcL    Neut # 10.6 (H) 2.1 - 9.2 x10(3)/mcL    Mono # 0.88 0.1 - 1.3 x10(3)/mcL    Eos # 0.72 0 - 0.9 x10(3)/mcL    Baso # 0.15 0 - 0.2 x10(3)/mcL    IG# 0.28 (H) 0 - 0.04 x10(3)/mcL    IG% 2.0 %    NRBC% 0.0 %     There is no height or weight on file to calculate BMI.    Physical Exam  Constitutional: NAD  Lungs: CTAB, No crackles, No wheezes  Cardiovascular: Normal heart sounds, No MRG  Abdomen: Soft, Nontender, No palpable hepatosplenomegaly, No abdominal masses, No ascites  Extremities: No cyanosis, No clubbing, No edema  Clinical Breast exam performed.  Right mastectomy with healed incision scar.  No  drainage or erythema.  Left breast without dimpling, retraction, tenderness or erythema or masses. .  Nipple and areola without irritation, erythema or discharge. No eversion or inversion of the nipple.  Axillary without any masses or LAD noted.  GYN: Cervix pink, no erythema, anterior, no discharge, no odor.    Assessment:   Cookie Ugalde is a 61 y.o. female with:    1. Infiltrating ductal carcinoma of right breast    2. Triple negative malignant neoplasm of breast    3. Primary hypertension        Plan:   -IDC of Right breast/Triple negative  Keep scheduled chemo appointments  Clinical Breast exam performed today and no abnormal findings noted  Dental appointment prior to starting alendronate scheduled by pt    -HTN  Stable, well controlled, Blood pressure at goal ( <140/90)   Compliant with medications, No side effects of medications.   Continue current medications regimen  Ordered Yearly:  FLP, HbA1c, UA   Encourage smoking cessation, consult nutrition for DASH diet, weight loss, regular aerobic exercise, decrease EtOH, avoid NSAIDs, take medications as prescribed   Monitors BP at home. Bring log next visit.   ED precautions given      HM  Pap smear perfomed today  Ordered hepatitis C screen  Orderd HIV screen     RTC in 3 months and will get the shingles vaccine and pneumonia vaccine. Pt to get the name of the GI doctor that her insurance will cover for the colonoscopy            No follow-ups on file.    Future Appointments   Date Time Provider Department Center   9/13/2022  9:00 AM RESIDENT 13, [facility] FAMILY MEDICINE [facility] FM RES [place]    9/13/2022  3:00 PM INFUSION ROOM 536, [facility] CHEMOTHERAPY INFUSION [facility] CHEMO [place]    9/27/2022  9:10 AM NURSE, [facility]Y [facility]    9/27/2022 10:20 AM [facility]ick Su [facility]    10/10/2022  2:00 PM Ebonie Quezada, CNP [facility]    10/27/2022  1:00 PM PROVIDER, [facility] BREAST SURGERY [facility] T[place]  [place]        Staff: Dr. Noy Pugh MD  Family Medicine PGY-2

## 2022-09-14 NOTE — PROGRESS NOTES
Discussed with resident at time of encounter (09-13-22).  Recent completion of chemotherapy (re: breast CA).  HTN.  Resident's note reviewed 09-14-22.   Agree with assessment; plan of care appropriate.  Professional services provided in an outpatient primary care center affiliated with a teaching institution.

## 2022-09-21 LAB
HPV16+18+H RISK 12 DNA CVX-IMP: NEGATIVE
INSULIN SERPL-ACNC: NORMAL U[IU]/ML
LAB AP BETHESDA CATEGORY: NORMAL
LAB AP CLINICAL FINDINGS: NORMAL
LAB AP CONTRACEPTIVES: NORMAL
LAB AP GYN ADDITIONAL FINDINGS: NORMAL
LAB AP GYN MOLECULAR TESTING: NORMAL
LAB AP LMP DATE: NORMAL
LAB AP OCHS PAP SPECIMEN ADEQUACY: NORMAL
LAB AP OHS PAP INTERPRETATION: NORMAL
LAB AP PAP DISCLAIMER COMMENTS: NORMAL
LAB AP PAP ESTROGEN REPLACEMENT THERAPY: NORMAL
LAB AP PAP PMP: NORMAL
LAB AP PAP PREVIOUS BX: NORMAL
LAB AP PAP PRIOR TREATMENT: NORMAL

## 2022-09-25 PROBLEM — Z90.11 S/P RIGHT MASTECTOMY: Status: ACTIVE | Noted: 2022-09-25

## 2022-09-25 NOTE — PROGRESS NOTES
Past medical history: Tobacco abuse.  Hypertension.  Arthritis of right shoulder.  Procedure/surgical history:   Umbilical hernia repair .  Social history: .  Lives in [place], [place].  Has 4 children.  Works as a  in a [employer].  Has been smoking 5 cigarettes daily for 15 years.  Drinks half a pint of liquor over the weekend.  No illicit drugs.  Family history:   2 brothers experienced prostate cancer in their 50s   Father  from prostate cancer in his 60s-70s  No family history of breast cancer.  Menstrual and OB/GYN history: Menarche, age 14.  Menopause, late 40s.  First child at age 26 years.  No abortions or miscarriages.  Used birth control pills for 8 years.  Did not use hormone replacement therapy after menopause.  Breast-fed 1 child for 7 months.      Reason for follow-up:  -IDC right breast, triple negative, S/P right mastectomy 2022, pT2 pN0 MX, G3, AJCC anatomic stage IIA, pathological prognostic stage IIA  -family history of prostate cancer      History of present illness:   61-year-old female referred from surgery clinic, with newly diagnosed right breast cancer.    Oncologic history:   #IDC right breast, triple negative:   -2022: Bilateral diagnostic mammogram (chest wall pain)   -2022: Needle core biopsy   -2022: Right simple mastectomy and SLN biopsy   -2.2 cm, grade 3, no LVI, margins negative, 4 lymph nodes negative (including 3 negative SLN's)   >> pT2 pN0 MX, G3, triple negative, AJCC anatomic stage IIA, pathological prognostic stage IIA  -2022: FSH level 51.03 mIU/mL (postmenopausal); estradiol level <24 pg/mL  -2022:  TTE:  LVEF 55%  -2022:  Left subclavian MediPort placed  -cycle 1 adjuvant ddAC 2022  -cycle 2 adjuvant ddAC 2022  -cycle 3 of adjuvant ddAC 2022  -cycle 4 of adjuvant ddAC 2022  -S/P adjuvant dose dense Taxol:  2022, 08/15/2022, 2022, 2022  -2022:   Cervical Pap smear:  NIL; negative for high-risk HPV 16, 18/45      04/28/2022:  Presents for initial medical oncology consultation.    Interval history    09/27/2022:  -S/P adjuvant dose dense Taxol:  08/01/2022, 08/15/2022, 08/29/2022, 09/12/2022  -09/25/2022:  Cervical Pap smear:  NIL; negative for high-risk HPV 16, 18/45  Presents for a follow-up visit.  Doing well.  Cheerful.  No complaints.  Good appetite.  Good energy.  In the process of getting dental clearance.  Genetic testing is pending.       Review of systems:   All systems reviewed, and found to be negative except for the symptoms detailed above.      Physical examination:   VITAL SIGNS:  Reviewed.      GENERAL:  In no apparent distress.    HEAD:  No signs of head trauma.   EYES:  Pupils are equal.  Extraocular motions intact.    EARS:  Hearing grossly intact.   MOUTH:  Oropharynx is normal.   NECK:  No adenopathy, no JVD.      CHEST:  Chest with clear breath sounds bilaterally.  No wheezes, rales, or rhonchi.    CARDIAC:  Regular rate and rhythm.  S1 and S2, without murmurs, gallops, or rubs.   VASCULAR:  No Edema.  Peripheral pulses normal and equal in all extremities.   ABDOMEN:  Soft, without detectable tenderness.  No sign of distention.  No   rebound or guarding, and no masses palpated.   Bowel Sounds normal.   MUSCULOSKELETAL:  Good range of motion of all major joints. Extremities without clubbing, cyanosis or edema.    NEUROLOGIC EXAM:  Alert and oriented x 3.  No focal sensory or strength deficits.   Speech normal.  Follows commands.   PSYCHIATRIC:  Mood normal.   SKIN:  No rash or lesions.      Assessment:   #IDC right breast, triple negative:  To summarize:  IDC right breast, triple negative  Right simple mastectomy 04/11/2022  2.2 cm, grade 3, no LVI, margins negative, 4 lymph nodes negative  T2 pN0 MX, G3, triple negative, AJCC anatomic stage IIA, pathological prognostic stage IIA  Postmenopausal per labs (04/28/2022)  S/P adjuvant ddAC  x4, then dose dense Taxol x4 (05/30/2022-09/12/2022)       Plan:  -postmenopausal (per labs 04/28/2022)   -Needs genetic testing because of triple negative breast cancer; also, family history of prostate cancer     -pT2 pN0, status postmastectomy   -Does not need adjuvant radiotherapy    Needed adjuvant chemotherapy  S/P adjuvant ddAC x4, then adjuvant dose dense Taxol x4 (05/30/2022-09/12/2022     -Consider adjuvant bisphosphonate therapy for risk reduction of distant metastasis for 3-5 years in postmenopausal patients (natural or induced) with high risk node-negative or node positive tumors  -postmenopausal per labs 04/28/2022   >>>   -Will offer adjuvant bisphosphonate therapy to this patient (Zometa 4 mg IV q.6 months x3-5 years)  -will need dental evaluation and preventive Dentistry prior, in order to prevent/minimize the likelihood of osteonecrosis of the jaw     -Addition of 1 year of adjuvant olaparib is an option in this patient with pT2 triple negative tumor if germline testing returns positive for BRCA1/2 mutation    Follow-up visit with me in 1 month, after dental evaluation and genetic testing.     Above discussed at length with her.  All questions answered.   She understands and agrees this plan, and was appreciative.

## 2022-09-27 ENCOUNTER — OFFICE VISIT (OUTPATIENT)
Dept: HEMATOLOGY/ONCOLOGY | Facility: CLINIC | Age: 62
End: 2022-09-27
Payer: MEDICAID

## 2022-09-27 VITALS
OXYGEN SATURATION: 100 % | DIASTOLIC BLOOD PRESSURE: 90 MMHG | HEART RATE: 93 BPM | SYSTOLIC BLOOD PRESSURE: 149 MMHG | TEMPERATURE: 98 F | BODY MASS INDEX: 23.92 KG/M2 | RESPIRATION RATE: 20 BRPM | HEIGHT: 63 IN | WEIGHT: 135 LBS

## 2022-09-27 DIAGNOSIS — C50.919 TRIPLE NEGATIVE MALIGNANT NEOPLASM OF BREAST: ICD-10-CM

## 2022-09-27 DIAGNOSIS — C50.911 INFILTRATING DUCTAL CARCINOMA OF RIGHT BREAST: Primary | ICD-10-CM

## 2022-09-27 DIAGNOSIS — Z72.0 TOBACCO USER: ICD-10-CM

## 2022-09-27 DIAGNOSIS — Z80.42 FAMILY HISTORY OF MALIGNANT NEOPLASM OF PROSTATE: ICD-10-CM

## 2022-09-27 DIAGNOSIS — Z90.11 S/P RIGHT MASTECTOMY: ICD-10-CM

## 2022-09-27 LAB
ALBUMIN SERPL-MCNC: 4 GM/DL (ref 3.5–5)
ALBUMIN/GLOB SERPL: 1.2 RATIO (ref 1.1–2)
ALP SERPL-CCNC: 102 UNIT/L (ref 40–150)
ALT SERPL-CCNC: 18 UNIT/L (ref 0–55)
AST SERPL-CCNC: 25 UNIT/L (ref 5–34)
BASOPHILS # BLD AUTO: 0.12 X10(3)/MCL (ref 0–0.2)
BASOPHILS NFR BLD AUTO: 1.1 %
BILIRUBIN DIRECT+TOT PNL SERPL-MCNC: 0.5 MG/DL
BUN SERPL-MCNC: 13.3 MG/DL (ref 9.8–20.1)
CALCIUM SERPL-MCNC: 9.9 MG/DL (ref 8.4–10.2)
CHLORIDE SERPL-SCNC: 106 MMOL/L (ref 98–107)
CO2 SERPL-SCNC: 27 MMOL/L (ref 22–29)
CREAT SERPL-MCNC: 0.77 MG/DL (ref 0.55–1.02)
EOSINOPHIL # BLD AUTO: 0.23 X10(3)/MCL (ref 0–0.9)
EOSINOPHIL NFR BLD AUTO: 2.1 %
ERYTHROCYTE [DISTWIDTH] IN BLOOD BY AUTOMATED COUNT: 14.8 % (ref 11.5–17)
GFR SERPLBLD CREATININE-BSD FMLA CKD-EPI: >60 MLS/MIN/1.73/M2
GLOBULIN SER-MCNC: 3.4 GM/DL (ref 2.4–3.5)
GLUCOSE SERPL-MCNC: 128 MG/DL (ref 74–100)
HCT VFR BLD AUTO: 37 % (ref 37–47)
HGB BLD-MCNC: 11.3 GM/DL (ref 12–16)
IMM GRANULOCYTES # BLD AUTO: 0.14 X10(3)/MCL (ref 0–0.04)
IMM GRANULOCYTES NFR BLD AUTO: 1.3 %
LYMPHOCYTES # BLD AUTO: 1.68 X10(3)/MCL (ref 0.6–4.6)
LYMPHOCYTES NFR BLD AUTO: 15.2 %
MAGNESIUM SERPL-MCNC: 2 MG/DL (ref 1.6–2.6)
MCH RBC QN AUTO: 30.9 PG (ref 27–31)
MCHC RBC AUTO-ENTMCNC: 30.5 MG/DL (ref 33–36)
MCV RBC AUTO: 101.1 FL (ref 80–94)
MONOCYTES # BLD AUTO: 0.59 X10(3)/MCL (ref 0.1–1.3)
MONOCYTES NFR BLD AUTO: 5.3 %
NEUTROPHILS # BLD AUTO: 8.3 X10(3)/MCL (ref 2.1–9.2)
NEUTROPHILS NFR BLD AUTO: 75 %
NRBC BLD AUTO-RTO: 0 %
PLATELET # BLD AUTO: 317 X10(3)/MCL (ref 130–400)
PMV BLD AUTO: 9.9 FL (ref 7.4–10.4)
POTASSIUM SERPL-SCNC: 4.8 MMOL/L (ref 3.5–5.1)
PROT SERPL-MCNC: 7.4 GM/DL (ref 6.4–8.3)
RBC # BLD AUTO: 3.66 X10(6)/MCL (ref 4.2–5.4)
SODIUM SERPL-SCNC: 142 MMOL/L (ref 136–145)
WBC # SPEC AUTO: 11.1 X10(3)/MCL (ref 4.5–11.5)

## 2022-09-27 PROCEDURE — 3080F DIAST BP >= 90 MM HG: CPT | Mod: CPTII,,, | Performed by: INTERNAL MEDICINE

## 2022-09-27 PROCEDURE — 36415 COLL VENOUS BLD VENIPUNCTURE: CPT

## 2022-09-27 PROCEDURE — 99213 OFFICE O/P EST LOW 20 MIN: CPT | Mod: PBBFAC | Performed by: INTERNAL MEDICINE

## 2022-09-27 PROCEDURE — 3008F BODY MASS INDEX DOCD: CPT | Mod: CPTII,,, | Performed by: INTERNAL MEDICINE

## 2022-09-27 PROCEDURE — 1159F PR MEDICATION LIST DOCUMENTED IN MEDICAL RECORD: ICD-10-PCS | Mod: CPTII,,, | Performed by: INTERNAL MEDICINE

## 2022-09-27 PROCEDURE — 99214 OFFICE O/P EST MOD 30 MIN: CPT | Mod: S$PBB,,, | Performed by: INTERNAL MEDICINE

## 2022-09-27 PROCEDURE — 1159F MED LIST DOCD IN RCRD: CPT | Mod: CPTII,,, | Performed by: INTERNAL MEDICINE

## 2022-09-27 PROCEDURE — 3080F PR MOST RECENT DIASTOLIC BLOOD PRESSURE >= 90 MM HG: ICD-10-PCS | Mod: CPTII,,, | Performed by: INTERNAL MEDICINE

## 2022-09-27 PROCEDURE — 1160F RVW MEDS BY RX/DR IN RCRD: CPT | Mod: CPTII,,, | Performed by: INTERNAL MEDICINE

## 2022-09-27 PROCEDURE — 3077F PR MOST RECENT SYSTOLIC BLOOD PRESSURE >= 140 MM HG: ICD-10-PCS | Mod: CPTII,,, | Performed by: INTERNAL MEDICINE

## 2022-09-27 PROCEDURE — 99214 PR OFFICE/OUTPT VISIT, EST, LEVL IV, 30-39 MIN: ICD-10-PCS | Mod: S$PBB,,, | Performed by: INTERNAL MEDICINE

## 2022-09-27 PROCEDURE — 3008F PR BODY MASS INDEX (BMI) DOCUMENTED: ICD-10-PCS | Mod: CPTII,,, | Performed by: INTERNAL MEDICINE

## 2022-09-27 PROCEDURE — 1160F PR REVIEW ALL MEDS BY PRESCRIBER/CLIN PHARMACIST DOCUMENTED: ICD-10-PCS | Mod: CPTII,,, | Performed by: INTERNAL MEDICINE

## 2022-09-27 PROCEDURE — 3077F SYST BP >= 140 MM HG: CPT | Mod: CPTII,,, | Performed by: INTERNAL MEDICINE

## 2022-09-27 NOTE — Clinical Note
Orders for today:   Needs genetic testing Does not need radiotherapy Needs dental evaluation and preventive Dentistry before we can start her on adjuvant Zometa Please arrange for MediPort flush  Follow-up visit with me in 1 month, after dental evaluation and genetic testing.

## 2022-10-10 ENCOUNTER — OFFICE VISIT (OUTPATIENT)
Dept: HEMATOLOGY/ONCOLOGY | Facility: CLINIC | Age: 62
End: 2022-10-10
Payer: MEDICAID

## 2022-10-10 VITALS — BODY MASS INDEX: 23.05 KG/M2 | WEIGHT: 135 LBS | HEIGHT: 64 IN

## 2022-10-10 DIAGNOSIS — C50.911 INFILTRATING DUCTAL CARCINOMA OF RIGHT BREAST: Primary | ICD-10-CM

## 2022-10-10 DIAGNOSIS — Z80.42 FAMILY HISTORY OF MALIGNANT NEOPLASM OF PROSTATE: ICD-10-CM

## 2022-10-10 DIAGNOSIS — Z80.3 FAMILY HISTORY OF BREAST CANCER: ICD-10-CM

## 2022-10-10 DIAGNOSIS — C50.919 TRIPLE NEGATIVE MALIGNANT NEOPLASM OF BREAST: ICD-10-CM

## 2022-10-10 PROCEDURE — 1159F MED LIST DOCD IN RCRD: CPT | Mod: CPTII,95,, | Performed by: NURSE PRACTITIONER

## 2022-10-10 PROCEDURE — 3008F BODY MASS INDEX DOCD: CPT | Mod: CPTII,95,, | Performed by: NURSE PRACTITIONER

## 2022-10-10 PROCEDURE — 99215 OFFICE O/P EST HI 40 MIN: CPT | Mod: 95,,, | Performed by: NURSE PRACTITIONER

## 2022-10-10 PROCEDURE — 99215 PR OFFICE/OUTPT VISIT, EST, LEVL V, 40-54 MIN: ICD-10-PCS | Mod: 95,,, | Performed by: NURSE PRACTITIONER

## 2022-10-10 PROCEDURE — 3008F PR BODY MASS INDEX (BMI) DOCUMENTED: ICD-10-PCS | Mod: CPTII,95,, | Performed by: NURSE PRACTITIONER

## 2022-10-10 PROCEDURE — 1159F PR MEDICATION LIST DOCUMENTED IN MEDICAL RECORD: ICD-10-PCS | Mod: CPTII,95,, | Performed by: NURSE PRACTITIONER

## 2022-10-10 NOTE — PROGRESS NOTES
REFERRING PHYSICIAN: Dr. Derick Su        Patient ID: Cookie Ugalde is a 61 y.o. female.    Chief Complaint: Personal history of triple negative breast cancer [id number] a family history of cancer. Patient presented via Telemedicine Visit (audio and video) today for risk assessment, genetic counseling, and consideration for genetic testing.    HPI  Past Medical History:   Diagnosis Date    Malignant neoplasm of right breast in female, estrogen receptor negative     infiltrating ductal, s/p right simple mastectomy, SNL biopsy, triple neg    Primary hypertension     Vitamin D deficiency         Past Surgical History:   Procedure Laterality Date    HERNIA REPAIR      SIMPLE MASTECTOMY W/ SENTINEL NODE BIOPSY Right 04/11/2022    UMBILICAL HERNIA REPAIR N/A 1966        ALLERGIES:  Patient has no known allergies.     REVIEW OF SYSTEMS:  ROS         Oncology History   Oncology History   Infiltrating ductal carcinoma of right breast           Family History   Problem Relation Age of Onset    Stroke Mother     Hypertension Mother     Diabetes Mother     Hypertension Father     Prostate cancer Father         metastatic    Heart disease Father     Hypertension Sister     Diabetes Sister     Prostate cancer Brother 50    Prostate cancer Brother 57    Breast cancer Paternal Aunt     Breast cancer Paternal Aunt     Breast cancer Paternal Aunt     Breast cancer Paternal Aunt     Breast cancer Paternal Cousin         Assessment:     Risk Assessment:  This patient is at increased risk of having an inherited genetic mutation that increases the risk for cancer. She meets criteria for genetic testing based on the National Comprehensive Cancer Network (NCCN) criteria due to a personal history of triple negative breast cancer diagnosed under 60y (dx61y), with additional evidence due to family history that includes prostate cancer (brothers x 2; father) and breast cancer (paternal aunts x 4; paternal cousin) (see family history  and pedigree). Based on her likelihood of having a mutation, [employer] BRCA1/2 Analysis with [facility] panel testing was described in detail.    Education and Counseling:  [facility] evaluates a broad number of hereditary cancer syndromes to help define patients' cancer risk. This cancer panel tests 36 genes with known association to increased cancer risk: APC, CRYS, AXIN2, BARD1, BRCA1, BRCA2, BMPR1A, BRIP1, CDH1, CDK4, CDKN2A, CHEK2, DICER1, HOXB13, EPCAM, GREM1, MLH1, MSH2, MSH6, MUTYH, NBN, NF1, PALB2, PMS2, POLD1, POLE, PTEN, RAD50, RECQL, RAD51C, RAD51D, SMAD4, SMARCA4, STK11, TP53.    Risks of cancer associated with inherited cancer predisposition mutations were discussed in detail.  If a mutation were found, this patient would have a significantly increased risk for cancer.  Inherited cancer syndromes included in this test, may have different, but still significant risk for cancer.  Risk of cancer with any particular gene mutation will be discussed at the time of results disclosure and based on the results.    The availability of clinical management options for inherited cancer predisposition mutation carriers was discussed, including increased surveillance, chemoprevention, and prophylactic surgery. Details of the testing process, including benefits and limitations of genetic analysis as well as the implications of possible test results, were discussed.  Because this patient is the first member of her family to be tested comprehensive panel testing was presented.  Related insurance issues were discussed.      Summary:  This patient was evaluated for hereditary risk of cancer and was found to be at an increased risk of having an inherited cancer predisposition gene mutation.  The option of genetic testing was explained in detail, including the possible impact of this information on family members.  Since this patient wishes to proceed with testing an order will be placed online  with [email or web address]. Informed consent will be obtained, lab drawn and sent to [email or web address].  Results will be expected 4 weeks from this time.  A follow-up appointment will be scheduled for results disclosure.       The patient location is: home.     Visit type: audiovisual    Face to Face time with patient: 35 minutes  >60 minutes of total time spent on the encounter, which includes face to face time and non-face to face time preparing to see the patient (eg, review of tests), Obtaining and/or reviewing separately obtained history, Documenting clinical information in the electronic or other health record, Independently interpreting results (not separately reported) and communicating results to the patient/family/caregiver, or Care coordination (not separately reported).       Each patient to whom he or she provides medical services by telemedicine is:  (1) informed of the relationship between the physician and patient and the respective role of any other health care provider with respect to management of the patient; and (2) notified that he or she may decline to receive medical services by telemedicine and may withdraw from such care at any time.    ANJELICA CHAIDEZ, PhD

## 2022-10-28 RX ORDER — HYDROCHLOROTHIAZIDE 12.5 MG/1
12.5 CAPSULE ORAL DAILY
Qty: 90 CAPSULE | Refills: 0 | Status: SHIPPED | OUTPATIENT
Start: 2022-10-28 | End: 2023-01-17 | Stop reason: SDUPTHER

## 2022-10-29 NOTE — PROGRESS NOTES
History:  Past Medical History:   Diagnosis Date    Malignant neoplasm of right breast in female, estrogen receptor negative     infiltrating ductal, s/p right simple mastectomy, SNL biopsy, triple neg    Primary hypertension     Vitamin D deficiency    Past medical history: Tobacco abuse.  Hypertension.  Arthritis of right shoulder.  Procedure/surgical history:   Umbilical hernia repair .  Social history: .  Lives in [place], [place].  Has 4 children.  Works as a  in a [employer].  Has been smoking 5 cigarettes daily for 15 years.  Drinks half a pint of liquor over the weekend.  No illicit drugs.  Family history:   2 brothers experienced prostate cancer in their 50s   Father  from prostate cancer in his 60s-70s  No family history of breast cancer.  Menstrual and OB/GYN history: Menarche, age 14.  Menopause, late 40s.  First child at age 26 years.  No abortions or miscarriages.  Used birth control pills for 8 years.  Did not use hormone replacement therapy after menopause.  Breast-fed 1 child for 7 months.  Past Surgical History:   Procedure Laterality Date    HERNIA REPAIR      SIMPLE MASTECTOMY W/ SENTINEL NODE BIOPSY Right 2022    UMBILICAL HERNIA REPAIR N/A 1966      Social History     Socioeconomic History    Marital status:    Tobacco Use    Smoking status: Former     Packs/day: 0.50     Years: 22.00     Pack years: 11.00     Types: Cigarettes    Smokeless tobacco: Never   Substance and Sexual Activity    Alcohol use: Yes     Alcohol/week: 1.0 standard drink     Types: 1 Glasses of wine per week     Comment: Pt. states she drinks 1/2 pint a day on weekends    Drug use: Yes     Types: Marijuana    Sexual activity: Yes     Partners: Male      Family History   Problem Relation Age of Onset    Stroke Mother     Hypertension Mother     Diabetes Mother     Hypertension Father     Prostate cancer Father         metastatic    Heart disease Father     Hypertension  Sister     Diabetes Sister     Prostate cancer Brother 50    Prostate cancer Brother 57    Breast cancer Paternal Aunt     Breast cancer Paternal Aunt     Breast cancer Paternal Aunt     Breast cancer Paternal Aunt     Breast cancer Paternal Cousin         Reason for Follow-up:  -IDC right breast, triple negative, S/P right mastectomy 04/11/2022, pT2 pN0 MX, G3, AJCC anatomic stage IIA, pathological prognostic stage IIA  -family history of prostate cancer    History of Present Illness:   No chief complaint on file.        Oncologic/Hematologic History:  Oncology History   Infiltrating ductal carcinoma of right breast   4/11/2022 Cancer Staged    Staging form: Breast, AJCC 8th Edition  - Pathologic stage from 4/11/2022: Stage IIA (pT2, pN0, cM0, G3, ER-, OH-, HER2-)       5/1/2022 Initial Diagnosis    Malignant neoplasm of right breast in female, estrogen receptor negative     5/30/2022 - 7/13/2022 Chemotherapy    Treatment Summary   Plan Name: OP BREAST DOSE-DENSE AC - DOXORUBICIN CYCLOPHOSPHAMIDE Q2W  Treatment Goal: Curative  Status: Inactive  Start Date: 5/30/2022  End Date: 7/13/2022  Provider: ULICES Pena  Chemotherapy: DOXOrubicin chemo injection 96 mg, 60 mg/m2 = 96 mg, Intravenous, Clinic/HOD 1 time, 4 of 4 cycles  Administration: 96 mg (5/30/2022), 96 mg (6/13/2022), 96 mg (6/28/2022), 96 mg (7/12/2022)  cyclophosphamide 600 mg/m2 = 960 mg in sodium chloride 0.9% 250 mL chemo infusion, 600 mg/m2 = 960 mg, Intravenous, Clinic/HOD 1 time, 4 of 4 cycles  Administration: 960 mg (5/30/2022), 960 mg (6/13/2022), 960 mg (6/28/2022), 960 mg (7/12/2022)       7/26/2022 - 7/26/2022 Chemotherapy    Treatment Summary   Plan Name: OP BREAST PACLITAXEL Q2W  Treatment Goal: Curative  Status: Inactive  Start Date:   End Date:   Provider: ULICES Pena  Chemotherapy: PACLitaxeL (TAXOL) 175 mg/m2 = 288 mg in sodium chloride 0.9% 500 mL chemo infusion, 175 mg/m2 = 288 mg, Intravenous, Clinic/HOD 1 time, 0 of  4 cycles       8/1/2022 - 8/1/2022 Chemotherapy    Treatment Summary   Plan Name: OP BREAST PACLITAXEL Q2W  Treatment Goal: Curative  Status: Inactive  Start Date:   End Date:   Provider: Derick Su MD  Chemotherapy: PACLitaxeL (TAXOL) 175 mg/m2 = 288 mg in sodium chloride 0.9% 500 mL chemo infusion, 175 mg/m2, Intravenous, Clinic/HOD 1 time, 0 of 4 cycles       8/1/2022 -  Chemotherapy    Treatment Summary   Plan Name: OP BREAST PACLITAXEL Q2W  Treatment Goal: Curative  Status: Active  Start Date: 8/1/2022  End Date: 9/13/2022  Provider: Derick Su MD  Chemotherapy: PACLitaxeL (TAXOL) 288 mg in sodium chloride 0.9% 500 mL chemo infusion, 288 mg, Intravenous, Clinic/HOD 1 time, 4 of 4 cycles  Administration: 288 mg (8/1/2022), 288 mg (8/15/2022), 288 mg (8/29/2022), 288 mg (9/12/2022)       8/10/2022 - 8/10/2022 Chemotherapy    Treatment Summary   Plan Name: OP BREAST PACLITAXEL Q2W  Treatment Goal: Curative  Status: Inactive  Start Date:   End Date:   Provider: ULICES Pena  Chemotherapy: PACLitaxeL (TAXOL) 175 mg/m2 = 288 mg in sodium chloride 0.9% 500 mL chemo infusion, 175 mg/m2, Intravenous, Clinic/HOD 1 time, 0 of 4 cycles       #IDC right breast, triple negative:   -03/02/2022: Bilateral diagnostic mammogram (chest wall pain)   -03/08/2022: Needle core biopsy   -04/11/2022: Right simple mastectomy and SLN biopsy   -2.2 cm, grade 3, no LVI, margins negative, 4 lymph nodes negative (including 3 negative SLN's)   >> pT2 pN0 MX, G3, triple negative, AJCC anatomic stage IIA, pathological prognostic stage IIA  -04/28/2022: FSH level 51.03 mIU/mL (postmenopausal); estradiol level <24 pg/mL  -05/06/2022:  TTE:  LVEF 55%  -05/23/2022:  Left subclavian MediPort placed  -cycle 1 adjuvant ddAC 05/30/2022  -cycle 2 adjuvant ddAC 06/13/2022  -cycle 3 of adjuvant ddAC 06/28/2022  -cycle 4 of adjuvant ddAC 07/12/2022  -S/P adjuvant dose dense Taxol:  08/01/2022, 08/15/2022, 08/29/2022,  09/12/2022  -09/25/2022:  Cervical Pap smear:  NIL; negative for high-risk HPV 16, 18/45  -10/05/2022: Dental clearance obtained        04/28/2022:  Presents for initial medical oncology consultation.    Interval History:  PORT FLUSH   OP BREAST PACLITAXEL Q2W   11/01/2022:   -10/05/2022: Dental clearance obtained  Presents for a follow-up visit.  No complaints.  Some heartburn.  Need to start her on Zometa to prevent bone metastases.  Results of genetic testing is pending.      Medications:  Current Outpatient Medications on File Prior to Visit   Medication Sig Dispense Refill    amLODIPine (NORVASC) 10 MG tablet Take 1 tablet (10 mg total) by mouth once daily. 90 tablet 0    hydroCHLOROthiazide (MICROZIDE) 12.5 mg capsule Take 1 capsule (12.5 mg total) by mouth once daily. 90 capsule 0     No current facility-administered medications on file prior to visit.         Review of Systems:   All systems reviewed and found to be negative except for the symptoms detailed above      Physical Examination:   VITAL SIGNS: There were no vitals filed for this visit.  GENERAL:  In no apparent distress.    HEAD:  No signs of head trauma.  EYES:  Pupils are equal.  Extraocular motions intact.    EARS:  Hearing grossly intact.  MOUTH:  Oropharynx is normal.   NECK:  No adenopathy, no JVD.     CHEST:  Chest with clear breath sounds bilaterally.  No wheezes, rales, rhonchi.    CARDIAC:  Regular rate and rhythm.  S1 and S2, without murmurs, gallops, rubs.  VASCULAR:  No Edema.  Peripheral pulses normal and equal in all extremities.  ABDOMEN:  Soft, without detectable tenderness.  No sign of distention.  No   rebound or guarding, and no masses palpated.   Bowel Sounds normal.  MUSCULOSKELETAL:  Good range of motion of all major joints. Extremities without clubbing, cyanosis or edema.    NEUROLOGIC EXAM:  Alert and oriented x 3.  No focal sensory or strength deficits.   Speech normal.  Follows commands.  PSYCHIATRIC:  Mood normal.    No  results for input(s): CBC in the last 72 hours.   No results for input(s): CMP in the last 72 hours.     Assessment:  Problem List Items Addressed This Visit    None   #IDC right breast, triple negative:  To summarize:  IDC right breast, triple negative  Right simple mastectomy 04/11/2022  2.2 cm, grade 3, no LVI, margins negative, 4 lymph nodes negative  T2 pN0 MX, G3, triple negative, AJCC anatomic stage IIA, pathological prognostic stage IIA  Postmenopausal per labs (04/28/2022)  S/P adjuvant ddAC x4, then dose dense Taxol x4 (05/30/2022-09/12/2022)         Plan:  -postmenopausal (per labs 04/28/2022)   -Needs genetic testing because of triple negative breast cancer; also, family history of prostate cancer      -pT2 pN0, status postmastectomy   -Does not need adjuvant radiotherapy     S/P adjuvant ddAC x4, then adjuvant dose dense Taxol x4 (05/30/2022-09/12/2022      -Consider adjuvant bisphosphonate therapy for risk reduction of distant metastasis for 3-5 years in postmenopausal patients (natural or induced) with high risk node-negative or node positive tumors  -postmenopausal per labs 04/28/2022  -10/05/2022: Dental clearance obtained   >>>  Will offer adjuvant bisphosphonate therapy to this patient (Zometa 4 mg IV q6 months x3-5 years)      -Addition of 1 year of adjuvant olaparib is an option in this patient with pT2 triple negative tumor if germline testing returns positive for BRCA1/2 mutation     Follow-up visit with me in 1 month, after genetic testing.      Above discussed at length with her.  All questions answered.   She understands and agrees this plan, and was appreciative.         Follow-up:  No follow-ups on file.

## 2022-11-01 ENCOUNTER — INFUSION (OUTPATIENT)
Dept: INFUSION THERAPY | Facility: HOSPITAL | Age: 62
End: 2022-11-01
Attending: INTERNAL MEDICINE
Payer: MEDICAID

## 2022-11-01 ENCOUNTER — OFFICE VISIT (OUTPATIENT)
Dept: HEMATOLOGY/ONCOLOGY | Facility: CLINIC | Age: 62
End: 2022-11-01
Payer: MEDICAID

## 2022-11-01 VITALS
OXYGEN SATURATION: 100 % | WEIGHT: 136.25 LBS | TEMPERATURE: 98 F | DIASTOLIC BLOOD PRESSURE: 86 MMHG | BODY MASS INDEX: 24.14 KG/M2 | HEIGHT: 63 IN | SYSTOLIC BLOOD PRESSURE: 137 MMHG | HEART RATE: 89 BPM | RESPIRATION RATE: 20 BRPM

## 2022-11-01 DIAGNOSIS — C50.911 INFILTRATING DUCTAL CARCINOMA OF RIGHT BREAST: Primary | ICD-10-CM

## 2022-11-01 DIAGNOSIS — C50.919 TRIPLE NEGATIVE MALIGNANT NEOPLASM OF BREAST: ICD-10-CM

## 2022-11-01 DIAGNOSIS — Z80.42 FAMILY HISTORY OF MALIGNANT NEOPLASM OF PROSTATE: ICD-10-CM

## 2022-11-01 DIAGNOSIS — Z90.11 S/P RIGHT MASTECTOMY: ICD-10-CM

## 2022-11-01 LAB
ALBUMIN SERPL-MCNC: 4.2 GM/DL (ref 3.4–4.8)
ALBUMIN/GLOB SERPL: 1.3 RATIO (ref 1.1–2)
ALP SERPL-CCNC: 83 UNIT/L (ref 40–150)
ALT SERPL-CCNC: 13 UNIT/L (ref 0–55)
AST SERPL-CCNC: 20 UNIT/L (ref 5–34)
BASOPHILS # BLD AUTO: 0.05 X10(3)/MCL (ref 0–0.2)
BASOPHILS NFR BLD AUTO: 0.7 %
BILIRUBIN DIRECT+TOT PNL SERPL-MCNC: 0.6 MG/DL
BUN SERPL-MCNC: 14.4 MG/DL (ref 9.8–20.1)
CALCIUM SERPL-MCNC: 9.9 MG/DL (ref 8.4–10.2)
CHLORIDE SERPL-SCNC: 107 MMOL/L (ref 98–107)
CO2 SERPL-SCNC: 27 MMOL/L (ref 23–31)
CREAT SERPL-MCNC: 0.79 MG/DL (ref 0.55–1.02)
EOSINOPHIL # BLD AUTO: 0.16 X10(3)/MCL (ref 0–0.9)
EOSINOPHIL NFR BLD AUTO: 2.1 %
ERYTHROCYTE [DISTWIDTH] IN BLOOD BY AUTOMATED COUNT: 12.8 % (ref 11.5–17)
GFR SERPLBLD CREATININE-BSD FMLA CKD-EPI: >60 MLS/MIN/1.73/M2
GLOBULIN SER-MCNC: 3.3 GM/DL (ref 2.4–3.5)
GLUCOSE SERPL-MCNC: 96 MG/DL (ref 82–115)
HCT VFR BLD AUTO: 41.1 % (ref 37–47)
HGB BLD-MCNC: 13.3 GM/DL (ref 12–16)
IMM GRANULOCYTES # BLD AUTO: 0.03 X10(3)/MCL (ref 0–0.04)
IMM GRANULOCYTES NFR BLD AUTO: 0.4 %
LYMPHOCYTES # BLD AUTO: 1.99 X10(3)/MCL (ref 0.6–4.6)
LYMPHOCYTES NFR BLD AUTO: 26.6 %
MCH RBC QN AUTO: 30.2 PG (ref 27–31)
MCHC RBC AUTO-ENTMCNC: 32.4 MG/DL (ref 33–36)
MCV RBC AUTO: 93.2 FL (ref 80–94)
MONOCYTES # BLD AUTO: 0.46 X10(3)/MCL (ref 0.1–1.3)
MONOCYTES NFR BLD AUTO: 6.1 %
NEUTROPHILS # BLD AUTO: 4.8 X10(3)/MCL (ref 2.1–9.2)
NEUTROPHILS NFR BLD AUTO: 64.1 %
NRBC BLD AUTO-RTO: 0 %
PLATELET # BLD AUTO: 241 X10(3)/MCL (ref 130–400)
PMV BLD AUTO: 9.6 FL (ref 7.4–10.4)
POTASSIUM SERPL-SCNC: 4.6 MMOL/L (ref 3.5–5.1)
PROT SERPL-MCNC: 7.5 GM/DL (ref 5.8–7.6)
RBC # BLD AUTO: 4.41 X10(6)/MCL (ref 4.2–5.4)
SODIUM SERPL-SCNC: 143 MMOL/L (ref 136–145)
WBC # SPEC AUTO: 7.5 X10(3)/MCL (ref 4.5–11.5)

## 2022-11-01 PROCEDURE — 1159F PR MEDICATION LIST DOCUMENTED IN MEDICAL RECORD: ICD-10-PCS | Mod: CPTII,,, | Performed by: INTERNAL MEDICINE

## 2022-11-01 PROCEDURE — 3079F DIAST BP 80-89 MM HG: CPT | Mod: CPTII,,, | Performed by: INTERNAL MEDICINE

## 2022-11-01 PROCEDURE — 1160F RVW MEDS BY RX/DR IN RCRD: CPT | Mod: CPTII,,, | Performed by: INTERNAL MEDICINE

## 2022-11-01 PROCEDURE — 85025 COMPLETE CBC W/AUTO DIFF WBC: CPT

## 2022-11-01 PROCEDURE — 99213 OFFICE O/P EST LOW 20 MIN: CPT | Mod: PBBFAC,25 | Performed by: INTERNAL MEDICINE

## 2022-11-01 PROCEDURE — 80053 COMPREHEN METABOLIC PANEL: CPT

## 2022-11-01 PROCEDURE — 99213 OFFICE O/P EST LOW 20 MIN: CPT | Mod: S$PBB,,, | Performed by: INTERNAL MEDICINE

## 2022-11-01 PROCEDURE — 3075F SYST BP GE 130 - 139MM HG: CPT | Mod: CPTII,,, | Performed by: INTERNAL MEDICINE

## 2022-11-01 PROCEDURE — 96365 THER/PROPH/DIAG IV INF INIT: CPT

## 2022-11-01 PROCEDURE — 25000003 PHARM REV CODE 250: Performed by: INTERNAL MEDICINE

## 2022-11-01 PROCEDURE — 1160F PR REVIEW ALL MEDS BY PRESCRIBER/CLIN PHARMACIST DOCUMENTED: ICD-10-PCS | Mod: CPTII,,, | Performed by: INTERNAL MEDICINE

## 2022-11-01 PROCEDURE — 3079F PR MOST RECENT DIASTOLIC BLOOD PRESSURE 80-89 MM HG: ICD-10-PCS | Mod: CPTII,,, | Performed by: INTERNAL MEDICINE

## 2022-11-01 PROCEDURE — 99213 PR OFFICE/OUTPT VISIT, EST, LEVL III, 20-29 MIN: ICD-10-PCS | Mod: S$PBB,,, | Performed by: INTERNAL MEDICINE

## 2022-11-01 PROCEDURE — 3075F PR MOST RECENT SYSTOLIC BLOOD PRESS GE 130-139MM HG: ICD-10-PCS | Mod: CPTII,,, | Performed by: INTERNAL MEDICINE

## 2022-11-01 PROCEDURE — 36415 COLL VENOUS BLD VENIPUNCTURE: CPT

## 2022-11-01 PROCEDURE — 1159F MED LIST DOCD IN RCRD: CPT | Mod: CPTII,,, | Performed by: INTERNAL MEDICINE

## 2022-11-01 PROCEDURE — 63600175 PHARM REV CODE 636 W HCPCS: Performed by: INTERNAL MEDICINE

## 2022-11-01 RX ORDER — SODIUM CHLORIDE 0.9 % (FLUSH) 0.9 %
10 SYRINGE (ML) INJECTION
Status: CANCELLED | OUTPATIENT
Start: 2023-04-18

## 2022-11-01 RX ORDER — HEPARIN 100 UNIT/ML
500 SYRINGE INTRAVENOUS
Status: DISCONTINUED | OUTPATIENT
Start: 2022-11-01 | End: 2022-11-01 | Stop reason: HOSPADM

## 2022-11-01 RX ORDER — HEPARIN 100 UNIT/ML
500 SYRINGE INTRAVENOUS
Status: CANCELLED | OUTPATIENT
Start: 2023-04-18

## 2022-11-01 RX ORDER — SODIUM CHLORIDE 0.9 % (FLUSH) 0.9 %
10 SYRINGE (ML) INJECTION
Status: DISCONTINUED | OUTPATIENT
Start: 2022-11-01 | End: 2022-11-01 | Stop reason: HOSPADM

## 2022-11-01 RX ADMIN — ZOLEDRONIC ACID 4 MG: 4 INJECTION, SOLUTION, CONCENTRATE INTRAVENOUS at 12:11

## 2022-11-01 RX ADMIN — SODIUM CHLORIDE: 9 INJECTION, SOLUTION INTRAVENOUS at 12:11

## 2022-11-01 RX ADMIN — Medication 500 UNITS: at 12:11

## 2022-11-01 NOTE — Clinical Note
Orders for today:  Needs genetic testing (triple negative breast cancer; family history of prostate cancer)  Start Zometa 4 mg IV every 6 months x5 years  Follow-up visit with me in 1 month, after genetic testing

## 2022-11-07 ENCOUNTER — OFFICE VISIT (OUTPATIENT)
Dept: HEMATOLOGY/ONCOLOGY | Facility: CLINIC | Age: 62
End: 2022-11-07
Payer: MEDICAID

## 2022-11-07 VITALS — WEIGHT: 134 LBS | BODY MASS INDEX: 23.74 KG/M2 | HEIGHT: 63 IN

## 2022-11-07 DIAGNOSIS — Z15.01 BRCA2 GENE MUTATION POSITIVE: Primary | ICD-10-CM

## 2022-11-07 DIAGNOSIS — Z15.09 BRCA2 GENE MUTATION POSITIVE: Primary | ICD-10-CM

## 2022-11-07 DIAGNOSIS — C50.911 INFILTRATING DUCTAL CARCINOMA OF RIGHT BREAST: ICD-10-CM

## 2022-11-07 PROCEDURE — 99214 PR OFFICE/OUTPT VISIT, EST, LEVL IV, 30-39 MIN: ICD-10-PCS | Mod: 95,,, | Performed by: NURSE PRACTITIONER

## 2022-11-07 PROCEDURE — 1159F PR MEDICATION LIST DOCUMENTED IN MEDICAL RECORD: ICD-10-PCS | Mod: CPTII,95,, | Performed by: NURSE PRACTITIONER

## 2022-11-07 PROCEDURE — 99214 OFFICE O/P EST MOD 30 MIN: CPT | Mod: 95,,, | Performed by: NURSE PRACTITIONER

## 2022-11-07 PROCEDURE — 1159F MED LIST DOCD IN RCRD: CPT | Mod: CPTII,95,, | Performed by: NURSE PRACTITIONER

## 2022-11-07 PROCEDURE — 3008F BODY MASS INDEX DOCD: CPT | Mod: CPTII,95,, | Performed by: NURSE PRACTITIONER

## 2022-11-07 PROCEDURE — 3008F PR BODY MASS INDEX (BMI) DOCUMENTED: ICD-10-PCS | Mod: CPTII,95,, | Performed by: NURSE PRACTITIONER

## 2022-11-07 NOTE — Clinical Note
Per patient request referrals placed to gyn (salpingo oophorectomy) and breast surgery (prophylactic mastectomy)

## 2022-11-07 NOTE — PROGRESS NOTES
REFERRING PHYSICIAN: Dr. Su    Subjective:       Patient ID: Cookie Ugalde is a 62 y.o. female.    Chief Complaint: Personal history of triple negative breast cancer [id number] a family history of cancer. Patient presented for genetic counseling on 10/10/2022 and was found appropriate for genetic testing based on the National Comprehensive Cancer Network (NCCN) criteria due to a personal history of triple negative breast cancer diagnosed under 60y (dx61y), with additional evidence due to family history that includes prostate cancer (brothers x 2; father) and breast cancer (paternal aunts x 4; paternal cousin) (see family history and pedigree). She signed consent, lab was drawn and sent to [email or web address] for BRCA1/2 Analysis with [facility] panel testing. She presented via Telemedicine Visit (audio only) today for results disclosure.     HPI  Past Medical History:   Diagnosis Date    Malignant neoplasm of right breast in female, estrogen receptor negative     infiltrating ductal, s/p right simple mastectomy, SNL biopsy, triple neg    Primary hypertension     Vitamin D deficiency       Past Surgical History:   Procedure Laterality Date    HERNIA REPAIR      SIMPLE MASTECTOMY W/ SENTINEL NODE BIOPSY Right 04/11/2022    UMBILICAL HERNIA REPAIR N/A 1966      Patient has no known allergies.     Review of Systems            Problem List Items Addressed This Visit    None    Oncology History   Infiltrating ductal carcinoma of right breast   4/11/2022 Cancer Staged    Staging form: Breast, AJCC 8th Edition  - Pathologic stage from 4/11/2022: Stage IIA (pT2, pN0, cM0, G3, ER-, KS-, HER2-)       5/1/2022 Initial Diagnosis    Malignant neoplasm of right breast in female, estrogen receptor negative     5/30/2022 - 7/13/2022 Chemotherapy    Treatment Summary   Plan Name: OP BREAST DOSE-DENSE AC - DOXORUBICIN CYCLOPHOSPHAMIDE Q2W  Treatment Goal: Curative  Status: Inactive  Start Date: 5/30/2022  End Date:  7/13/2022  Provider: ULICES Pena  Chemotherapy: DOXOrubicin chemo injection 96 mg, 60 mg/m2 = 96 mg, Intravenous, Clinic/HOD 1 time, 4 of 4 cycles  Administration: 96 mg (5/30/2022), 96 mg (6/13/2022), 96 mg (6/28/2022), 96 mg (7/12/2022)  cyclophosphamide 600 mg/m2 = 960 mg in sodium chloride 0.9% 250 mL chemo infusion, 600 mg/m2 = 960 mg, Intravenous, Clinic/HOD 1 time, 4 of 4 cycles  Administration: 960 mg (5/30/2022), 960 mg (6/13/2022), 960 mg (6/28/2022), 960 mg (7/12/2022)       7/26/2022 - 7/26/2022 Chemotherapy    Treatment Summary   Plan Name: OP BREAST PACLITAXEL Q2W  Treatment Goal: Curative  Status: Inactive  Start Date:   End Date:   Provider: ULICES Pena  Chemotherapy: PACLitaxeL (TAXOL) 175 mg/m2 = 288 mg in sodium chloride 0.9% 500 mL chemo infusion, 175 mg/m2 = 288 mg, Intravenous, Clinic/HOD 1 time, 0 of 4 cycles       8/1/2022 - 8/1/2022 Chemotherapy    Treatment Summary   Plan Name: OP BREAST PACLITAXEL Q2W  Treatment Goal: Curative  Status: Inactive  Start Date:   End Date:   Provider: Derick Su MD  Chemotherapy: PACLitaxeL (TAXOL) 175 mg/m2 = 288 mg in sodium chloride 0.9% 500 mL chemo infusion, 175 mg/m2, Intravenous, Clinic/HOD 1 time, 0 of 4 cycles       8/1/2022 -  Chemotherapy    Treatment Summary   Plan Name: OP BREAST PACLITAXEL Q2W  Treatment Goal: Curative  Status: Active  Start Date: 8/1/2022  End Date: 9/13/2022  Provider: Derick Su MD  Chemotherapy: PACLitaxeL (TAXOL) 288 mg in sodium chloride 0.9% 500 mL chemo infusion, 288 mg, Intravenous, Clinic/HOD 1 time, 4 of 4 cycles  Administration: 288 mg (8/1/2022), 288 mg (8/15/2022), 288 mg (8/29/2022), 288 mg (9/12/2022)       8/10/2022 - 8/10/2022 Chemotherapy    Treatment Summary   Plan Name: OP BREAST PACLITAXEL Q2W  Treatment Goal: Curative  Status: Inactive  Start Date:   End Date:   Provider: ULICES Pena  Chemotherapy: PACLitaxeL (TAXOL) 175 mg/m2 = 288 mg in sodium chloride 0.9% 500 mL  chemo infusion, 175 mg/m2, Intravenous, Clinic/HOD 1 time, 0 of 4 cycles              Family History   Problem Relation Age of Onset    Stroke Mother     Hypertension Mother     Diabetes Mother     Hypertension Father     Prostate cancer Father         metastatic    Heart disease Father     Hypertension Sister     Diabetes Sister     Prostate cancer Brother 50    Prostate cancer Brother 57    Breast cancer Paternal Aunt     Breast cancer Paternal Aunt     Breast cancer Paternal Aunt     Breast cancer Paternal Aunt     Breast cancer Paternal Cousin         Assessment:   Genetic testing was appropriate for this patient because of her personal and family history. This genetics test indicated that there was a deleterious mutation: BRCA2 p.* (c.8969G>A). 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  Although the exact risk of breast and ovarian cancer conferred by this specific mutation has not been determined, studies in high risk families indicate that deleterious mutations in BRCA2 may confer as much as an 84% risk of breast cancer and a 27% risk of ovarian cancer in a lifetime in women, at least a 6% lifetime risk of male breast cancer, 15% risk of prostate cancer by age 65 in men, 7% lifetime pancreatic cancer risk (risk may be higher if there is a family history of pancreatic cancer), and an elevated (not yet defined) risk of melanoma of both the skin and eye.      The National Comprehensive Cancer Network (NCCN) has established guidelines for the management of cancer risks associated with BRCA1/2. The options available to BRCA2 mutation carriers were explained to her, including increased surveillance, chemoprevention, and prophylactic surgery. The NCCN surveillance recommendation for the risk of breast cancer includes annual mammogram and annual breast MRI, clinical breast exam every 6-12 months, and self-breast exam education. A reasonable schedule is  evaluation with breast MRI alternating with mammograms every 6 months. Chemoprevention with hormone blocking medications may decrease the risk of breast cancer in BRCA2 mutation carriers by as much as 62%. A surgical option is prophylactic total (simple) mastectomy which would decrease the likelihood of breast cancer by approximately 85-90%, although not altogether eradicate the risk. Ms. Ugalde has a history of right mastectomy for her cancer diagnosis. She is requesting a prophylactic left mastectomy. A breast surgeon referral will be placed.     For the risk of ovarian cancer with BRCA2 mutations, surveillance would include transvaginal ultrasound every six months with clinical pelvic exam and lab work to include a  every 6 months. However, although recommended as an attempt to detect ovarian cancer while still potentially curable, there is no data demonstrating that these measures reduce mortality from ovarian cancer. NCCN recommends risk-reducing bilateral salpingo-oophorectomy (RRBSO) for women between 35 and 45 years of age who have completed child bearing was the surgical option discussed at length. The point was made that with this prophylactic surgery the risk of developing ovarian cancer would be reduced by approximately 96%, although not altogether eradicated. Even with RRBSO, surveillance would need to be maintained for her lifetime for the risk of peritoneal carcinomatosis. In addition to the decrease in the risk of development of ovarian cancer, RRBSO has been shown to reduce the risk of breast cancer mortality in mutation carriers that develop breast cancer. It has been shown that RRBSO within one year of breast cancer diagnosis significantly reduced the risk of dying from the disease (Allyson et al, 2015, [facility]UR[facility] [place].) Ms. Ugalde will be referred to GYN clinic for RRBSO.     For patients with a family history of pancreatic cancer and a BRCA1/2 mutation, consider available options for  pancreatic cancer screening, including the possibility of endoscopic ultrasonography (EUS) and MRI/magnetic resonance cholangiopancreatography (MRCP). Screening should begin at For patients with a family history of pancreatic cancer, consider available options for pancreatic cancer screening, including the possibility of endoscopic ultrasonography (EUS) and MRI/magnetic resonance cholangiopancreatography (MRCP). It is recommended that patients who are candidates for pancreatic cancer screening be managed by a multidisciplinary team with experience in screening for pancreatic cancer, preferably within research protocols. Screening should begin at 50y, or 10 years prior to the earliest pancreatic cancer diagnosis in the family. It is recommended that patients who are candidates for pancreatic cancer screening be managed by a multidisciplinary team with experience in screening for pancreatic cancer, preferably within research protocols. Ms. Ugalde has no family history of pancreatic cancer.    This comprehensive [facility] analysed  36 genes with known association to increased cancer risk: : APC, CRYS, AXIN2, BARD1, BMPR1A, BRCA1, BRCA2, BRIP1, CDH1, CDK4, CDKN2A, CHEK2, DICER1, MLH1, MSH2, MSH3, MSH6, MUTYH, NBN, NF1, NTHL1, PALB2, PMS2, PTEN, RAD51C, RAD51D, RECQL, SMAD4, SMARCA4, STK11 and TP53 (sequencing and deletion/duplication); HOXB13, POLD1 and POLE (sequencing only); EPCAM and GREM1 (deletion/duplication only). RNA data is routinely analyzed for use in variant interpretation for all genes. In addition to the BRCA2 mutation noted above, there was a variant of uncertain significance (VUS): PMS2 p.L114F (c.340C>T). There are currently insufficient data to determine if this variant does or does not cause increased cancer risk. Therefore, the contribution of this variant to the relative risk of cancer cannot be established from this analysis. If as a result of ongoing reclassification efforts, [facility] should determine that this variant becomes clinically actionable, an amended report will be sent to me as healthcare provider. I will then contact the patient for a discussion of implications of this new information and a healthcare plan will be made accordingly.    Each first degree relative has a 50% risk of having this same BRCA2 mutation. Family members can be tested using specific site analysis. Patient will be provided with copies of a simple letter of explanation to assist with informing family members.     Ms. Ugalde does not do email. She has requested that I mail her the results and family letter.        The patient location is: home    Visit type: audio only    Time with patient: 15 minutes  >30 minutes of total time spent on the encounter, which includes face to face time and non-face to face time preparing to see the patient (eg, review of tests), Obtaining and/or reviewing separately obtained history, Documenting clinical information in the electronic or other health record, Independently interpreting results (not separately reported) and communicating results to the patient/family/caregiver, or Care coordination (not separately reported).         Each patient to whom he or she provides medical services by telemedicine is:  (1) informed of the relationship between the physician and patient and the respective role of any other health care provider with respect to management of the patient; and (2) notified that he or she may decline to receive medical services by telemedicine and may withdraw from such care at any time.    ANJELICA CHAIDEZ, PhD

## 2022-11-18 ENCOUNTER — HOSPITAL ENCOUNTER (OUTPATIENT)
Dept: RADIOLOGY | Facility: HOSPITAL | Age: 62
Discharge: HOME | End: 2022-11-18
Attending: STUDENT IN AN ORGANIZED HEALTH CARE EDUCATION/TRAINING PROGRAM
Payer: MEDICAID

## 2022-11-18 DIAGNOSIS — Z85.3 HISTORY OF RIGHT BREAST CANCER: ICD-10-CM

## 2022-11-18 PROCEDURE — 77063 MAMMO DIGITAL SCREENING BILAT WITH TOMO: ICD-10-PCS | Mod: 26,,, | Performed by: RADIOLOGY

## 2022-11-18 PROCEDURE — 77067 MAMMO DIGITAL SCREENING BILAT WITH TOMO: ICD-10-PCS | Mod: 26,,, | Performed by: RADIOLOGY

## 2022-11-18 PROCEDURE — 77067 SCR MAMMO BI INCL CAD: CPT | Mod: 26,,, | Performed by: RADIOLOGY

## 2022-11-18 PROCEDURE — 77063 BREAST TOMOSYNTHESIS BI: CPT | Mod: 26,,, | Performed by: RADIOLOGY

## 2022-11-18 PROCEDURE — 77067 SCR MAMMO BI INCL CAD: CPT | Mod: TC

## 2022-12-01 ENCOUNTER — TELEPHONE (OUTPATIENT)
Dept: HEMATOLOGY/ONCOLOGY | Facility: CLINIC | Age: 62
End: 2022-12-01
Payer: MEDICAID

## 2022-12-01 ENCOUNTER — OFFICE VISIT (OUTPATIENT)
Dept: SURGERY | Facility: CLINIC | Age: 62
End: 2022-12-01
Payer: MEDICAID

## 2022-12-01 ENCOUNTER — OFFICE VISIT (OUTPATIENT)
Dept: HEMATOLOGY/ONCOLOGY | Facility: CLINIC | Age: 62
End: 2022-12-01
Payer: MEDICAID

## 2022-12-01 VITALS
HEIGHT: 63 IN | WEIGHT: 132.25 LBS | OXYGEN SATURATION: 100 % | HEART RATE: 92 BPM | BODY MASS INDEX: 23.43 KG/M2 | SYSTOLIC BLOOD PRESSURE: 136 MMHG | RESPIRATION RATE: 20 BRPM | DIASTOLIC BLOOD PRESSURE: 79 MMHG | TEMPERATURE: 98 F

## 2022-12-01 VITALS
SYSTOLIC BLOOD PRESSURE: 114 MMHG | HEART RATE: 97 BPM | BODY MASS INDEX: 23.21 KG/M2 | DIASTOLIC BLOOD PRESSURE: 79 MMHG | HEIGHT: 63 IN | WEIGHT: 131 LBS | TEMPERATURE: 98 F | RESPIRATION RATE: 18 BRPM | OXYGEN SATURATION: 99 %

## 2022-12-01 DIAGNOSIS — Z80.42 FAMILY HISTORY OF MALIGNANT NEOPLASM OF PROSTATE: ICD-10-CM

## 2022-12-01 DIAGNOSIS — Z15.09 BRCA2 GENE MUTATION POSITIVE: Primary | ICD-10-CM

## 2022-12-01 DIAGNOSIS — Z15.01 BRCA2 GENE MUTATION POSITIVE IN FEMALE: Primary | ICD-10-CM

## 2022-12-01 DIAGNOSIS — Z15.01 BRCA2 GENE MUTATION POSITIVE: ICD-10-CM

## 2022-12-01 DIAGNOSIS — Z15.09 BRCA2 GENE MUTATION POSITIVE: ICD-10-CM

## 2022-12-01 DIAGNOSIS — Z15.09 BRCA GENE MUTATION POSITIVE IN FEMALE: ICD-10-CM

## 2022-12-01 DIAGNOSIS — C50.919 TRIPLE NEGATIVE MALIGNANT NEOPLASM OF BREAST: ICD-10-CM

## 2022-12-01 DIAGNOSIS — Z15.02 BRCA2 GENE MUTATION POSITIVE IN FEMALE: Primary | ICD-10-CM

## 2022-12-01 DIAGNOSIS — Z15.01 BRCA2 GENE MUTATION POSITIVE: Primary | ICD-10-CM

## 2022-12-01 DIAGNOSIS — Z15.02 BRCA GENE MUTATION POSITIVE IN FEMALE: ICD-10-CM

## 2022-12-01 DIAGNOSIS — Z15.01 BRCA GENE MUTATION POSITIVE IN FEMALE: ICD-10-CM

## 2022-12-01 DIAGNOSIS — Z72.0 TOBACCO USER: ICD-10-CM

## 2022-12-01 DIAGNOSIS — Z15.09 BRCA2 GENE MUTATION POSITIVE IN FEMALE: Primary | ICD-10-CM

## 2022-12-01 DIAGNOSIS — C50.911 INFILTRATING DUCTAL CARCINOMA OF RIGHT BREAST: Primary | ICD-10-CM

## 2022-12-01 DIAGNOSIS — Z90.11 S/P RIGHT MASTECTOMY: ICD-10-CM

## 2022-12-01 PROCEDURE — 3075F SYST BP GE 130 - 139MM HG: CPT | Mod: CPTII,,, | Performed by: INTERNAL MEDICINE

## 2022-12-01 PROCEDURE — 3008F PR BODY MASS INDEX (BMI) DOCUMENTED: ICD-10-PCS | Mod: CPTII,,, | Performed by: INTERNAL MEDICINE

## 2022-12-01 PROCEDURE — 3008F BODY MASS INDEX DOCD: CPT | Mod: CPTII,,, | Performed by: INTERNAL MEDICINE

## 2022-12-01 PROCEDURE — 3075F PR MOST RECENT SYSTOLIC BLOOD PRESS GE 130-139MM HG: ICD-10-PCS | Mod: CPTII,,, | Performed by: INTERNAL MEDICINE

## 2022-12-01 PROCEDURE — 3078F PR MOST RECENT DIASTOLIC BLOOD PRESSURE < 80 MM HG: ICD-10-PCS | Mod: CPTII,,, | Performed by: INTERNAL MEDICINE

## 2022-12-01 PROCEDURE — 1159F MED LIST DOCD IN RCRD: CPT | Mod: CPTII,,, | Performed by: INTERNAL MEDICINE

## 2022-12-01 PROCEDURE — 1160F PR REVIEW ALL MEDS BY PRESCRIBER/CLIN PHARMACIST DOCUMENTED: ICD-10-PCS | Mod: CPTII,,, | Performed by: INTERNAL MEDICINE

## 2022-12-01 PROCEDURE — 1159F PR MEDICATION LIST DOCUMENTED IN MEDICAL RECORD: ICD-10-PCS | Mod: CPTII,,, | Performed by: INTERNAL MEDICINE

## 2022-12-01 PROCEDURE — 99215 OFFICE O/P EST HI 40 MIN: CPT | Mod: PBBFAC

## 2022-12-01 PROCEDURE — 99215 PR OFFICE/OUTPT VISIT, EST, LEVL V, 40-54 MIN: ICD-10-PCS | Mod: S$PBB,,, | Performed by: INTERNAL MEDICINE

## 2022-12-01 PROCEDURE — 3078F DIAST BP <80 MM HG: CPT | Mod: CPTII,,, | Performed by: INTERNAL MEDICINE

## 2022-12-01 PROCEDURE — 1160F RVW MEDS BY RX/DR IN RCRD: CPT | Mod: CPTII,,, | Performed by: INTERNAL MEDICINE

## 2022-12-01 PROCEDURE — 99213 OFFICE O/P EST LOW 20 MIN: CPT | Mod: PBBFAC,27 | Performed by: INTERNAL MEDICINE

## 2022-12-01 PROCEDURE — 99215 OFFICE O/P EST HI 40 MIN: CPT | Mod: S$PBB,,, | Performed by: INTERNAL MEDICINE

## 2022-12-01 RX ORDER — HEPARIN SODIUM 5000 [USP'U]/ML
5000 INJECTION, SOLUTION INTRAVENOUS; SUBCUTANEOUS ONCE
Status: CANCELLED | OUTPATIENT
Start: 2022-12-01

## 2022-12-01 RX ORDER — SODIUM CHLORIDE 9 MG/ML
INJECTION, SOLUTION INTRAVENOUS CONTINUOUS
Status: CANCELLED | OUTPATIENT
Start: 2022-12-01

## 2022-12-01 NOTE — PROGRESS NOTES
[facility] Breast Surgery Clinic Note    HPI: Cookie Ugalde is a 62 y.o. with PMHx of Triple (-) IDC of the R breast (s/p Mastectomy 04/11/22) who presents today to discuss prophylactic left mastectomy. Patient had genetic testing which revealed a deleterious mutation: BRAC2 p.* (c.8969G>A). She was recommended to have a prophylactic left mastectomy and bilateral oophorectomy. Denies new L breast changes. No new lumps/bumps, skin changes, nipple discharge, or pain. Denies f/c, night sweats, or weight loss. Completed chemotherapy for R breast triple (-) IDC and still has MediPort.       PMH:   Past Medical History:   Diagnosis Date    Malignant neoplasm of right breast in female, estrogen receptor negative     infiltrating ductal, s/p right simple mastectomy, SNL biopsy, triple neg    Primary hypertension     Vitamin D deficiency       Meds:   Current Outpatient Medications:     hydroCHLOROthiazide (MICROZIDE) 12.5 mg capsule, Take 1 capsule (12.5 mg total) by mouth once daily., Disp: 90 capsule, Rfl: 0    amLODIPine (NORVASC) 10 MG tablet, Take 1 tablet (10 mg total) by mouth once daily., Disp: 90 tablet, Rfl: 0  Allergies: Review of patient's allergies indicates:  No Known Allergies  Social History:   Social History     Tobacco Use    Smoking status: Former    Smokeless tobacco: Never   Substance Use Topics    Alcohol use: Not Currently     Comment: Pt. states she drinks 1/2 pint a day on weekends    Drug use: Yes     Types: Marijuana     Family History:   Family History   Problem Relation Age of Onset    Stroke Mother     Hypertension Mother     Diabetes Mother     Hypertension Father     Prostate cancer Father         metastatic    Heart disease Father     Hypertension Sister     Diabetes Sister     Prostate cancer Brother 50    Prostate cancer Brother 57    Breast cancer Paternal Aunt     Breast cancer Paternal Aunt     Breast cancer Paternal Aunt     Breast cancer Paternal Aunt     Breast cancer Paternal  Cousin      Surgical History:   Past Surgical History:   Procedure Laterality Date    HERNIA REPAIR      SIMPLE MASTECTOMY W/ SENTINEL NODE BIOPSY Right 2022    UMBILICAL HERNIA REPAIR N/A 1966     Review of Systems:  Skin: No rashes or itching.  Head: Denies headache or recent trauma.  Eyes: Denies eye pain or double vision.  Neck: Denies swelling or hoarseness of voice.  Respiratory: Denies shortness of breath or chest pain  Cardiac: Denies palpitations or swelling in hands/feet.  Gastrointestinal: Denies nausea, denies vomiting.  Urinary: Denies dysuria or hematuria.  Vascular: Denies claudication or leg swelling.  Neuro: Denies motor deficits. Denies weakness.  Endocrine: Denies excessive sweating or cold intolerance.  Psych: Denies memory problems. Denies anxiety.      Objective:    Vitals:  Vitals:    22 1252   BP: 114/79   Pulse: 97   Resp: 18   Temp: 97.9 °F (36.6 °C)        Physical Exam:  Gen: NAD  Neuro: awake, alert, answering questions appropriately  CV: RRR  Chest: R Chest surgical c/d/I, no new masses, nipple discharge, or axillary LAD; L chest with normal appearance, no palpable masses, axillary LAD, nipple discharge  Resp: non-labored breathing, EDWAR  Abd: soft, ND, NT  Ext: moves all 4 spontaneously and purposefully  Skin: warm, well perfused      Imagin22 - Mammo Bilat w/ Jesse  FINDINGS:   Right postmastectomy changes are noted.  A mediport overlies the left axilla on the oblique view mammogram, obscuring radiographic findings in the axilla.    No suspicious mass, asymmetry, distortion, or calcification is identified.      IMPRESSION: BENIGN  Right Breast: Benign (BI-RADS 2)  Left Breast: Benign (BI-RADS 2)      Assessment/Plan:  Cookie Ugalde is a 62 y.o. with PMHx of Triple (-) IDC of the R breast (s/p Mastectomy 22) who presents today to discuss prophylactic left mastectomy. Patient had genetic testing which revealed a deleterious mutation: BRAC2 p.*  (c.8969G>A).    -Will plan for L simple mastectomy on 12/14/22  -Will refer to [facility] for ovarian surgery  -Consented    Marissa Hooper MS3  12/01/2022 1:01 PM      Agree with above history and physical.   To OR for planned procedure 12/14/22    Hardeep Drummond MD  [employer] General Surgery PGY3

## 2022-12-01 NOTE — H&P (VIEW-ONLY)
[facility] Breast Surgery Clinic Note    HPI: Cookie Ugalde is a 62 y.o. with PMHx of Triple (-) IDC of the R breast (s/p Mastectomy 04/11/22) who presents today to discuss prophylactic left mastectomy. Patient had genetic testing which revealed a deleterious mutation: BRAC2 p.* (c.8969G>A). She was recommended to have a prophylactic left mastectomy and bilateral oophorectomy. Denies new L breast changes. No new lumps/bumps, skin changes, nipple discharge, or pain. Denies f/c, night sweats, or weight loss. Completed chemotherapy for R breast triple (-) IDC and still has MediPort.       PMH:   Past Medical History:   Diagnosis Date    Malignant neoplasm of right breast in female, estrogen receptor negative     infiltrating ductal, s/p right simple mastectomy, SNL biopsy, triple neg    Primary hypertension     Vitamin D deficiency       Meds:   Current Outpatient Medications:     hydroCHLOROthiazide (MICROZIDE) 12.5 mg capsule, Take 1 capsule (12.5 mg total) by mouth once daily., Disp: 90 capsule, Rfl: 0    amLODIPine (NORVASC) 10 MG tablet, Take 1 tablet (10 mg total) by mouth once daily., Disp: 90 tablet, Rfl: 0  Allergies: Review of patient's allergies indicates:  No Known Allergies  Social History:   Social History     Tobacco Use    Smoking status: Former    Smokeless tobacco: Never   Substance Use Topics    Alcohol use: Not Currently     Comment: Pt. states she drinks 1/2 pint a day on weekends    Drug use: Yes     Types: Marijuana     Family History:   Family History   Problem Relation Age of Onset    Stroke Mother     Hypertension Mother     Diabetes Mother     Hypertension Father     Prostate cancer Father         metastatic    Heart disease Father     Hypertension Sister     Diabetes Sister     Prostate cancer Brother 50    Prostate cancer Brother 57    Breast cancer Paternal Aunt     Breast cancer Paternal Aunt     Breast cancer Paternal Aunt     Breast cancer Paternal Aunt     Breast cancer Paternal  Cousin      Surgical History:   Past Surgical History:   Procedure Laterality Date    HERNIA REPAIR      SIMPLE MASTECTOMY W/ SENTINEL NODE BIOPSY Right 2022    UMBILICAL HERNIA REPAIR N/A 1966     Review of Systems:  Skin: No rashes or itching.  Head: Denies headache or recent trauma.  Eyes: Denies eye pain or double vision.  Neck: Denies swelling or hoarseness of voice.  Respiratory: Denies shortness of breath or chest pain  Cardiac: Denies palpitations or swelling in hands/feet.  Gastrointestinal: Denies nausea, denies vomiting.  Urinary: Denies dysuria or hematuria.  Vascular: Denies claudication or leg swelling.  Neuro: Denies motor deficits. Denies weakness.  Endocrine: Denies excessive sweating or cold intolerance.  Psych: Denies memory problems. Denies anxiety.      Objective:    Vitals:  Vitals:    22 1252   BP: 114/79   Pulse: 97   Resp: 18   Temp: 97.9 °F (36.6 °C)        Physical Exam:  Gen: NAD  Neuro: awake, alert, answering questions appropriately  CV: RRR  Chest: R Chest surgical c/d/I, no new masses, nipple discharge, or axillary LAD; L chest with normal appearance, no palpable masses, axillary LAD, nipple discharge  Resp: non-labored breathing, EDWAR  Abd: soft, ND, NT  Ext: moves all 4 spontaneously and purposefully  Skin: warm, well perfused      Imagin22 - Mammo Bilat w/ Jesse  FINDINGS:   Right postmastectomy changes are noted.  A mediport overlies the left axilla on the oblique view mammogram, obscuring radiographic findings in the axilla.    No suspicious mass, asymmetry, distortion, or calcification is identified.      IMPRESSION: BENIGN  Right Breast: Benign (BI-RADS 2)  Left Breast: Benign (BI-RADS 2)      Assessment/Plan:  Cookie Ugalde is a 62 y.o. with PMHx of Triple (-) IDC of the R breast (s/p Mastectomy 22) who presents today to discuss prophylactic left mastectomy. Patient had genetic testing which revealed a deleterious mutation: BRAC2 p.*  (c.8969G>A).    -Will plan for L simple mastectomy on 12/14/22  -Will refer to [facility] for ovarian surgery  -Consented    Marissa Hooper MS3  12/01/2022 1:01 PM      Agree with above history and physical.   To OR for planned procedure 12/14/22    Hardeep Drummond MD  [employer] General Surgery PGY3

## 2022-12-01 NOTE — PROGRESS NOTES
History:  Past Medical History:   Diagnosis Date    Malignant neoplasm of right breast in female, estrogen receptor negative     infiltrating ductal, s/p right simple mastectomy, SNL biopsy, triple neg    Primary hypertension     Vitamin D deficiency    Past medical history: Tobacco abuse.  Hypertension.  Arthritis of right shoulder.  Procedure/surgical history:   Umbilical hernia repair .  Social history: .  Lives in [place], [place].  Has 4 children.  Works as a  in a [employer].  Has been smoking 5 cigarettes daily for 15 years.  Drinks half a pint of liquor over the weekend.  No illicit drugs.  Family history:   2 brothers experienced prostate cancer in their 50s   Father  from prostate cancer in his 60s-70s  No family history of breast cancer.  Menstrual and OB/GYN history: Menarche, age 14.  Menopause, late 40s.  First child at age 26 years.  No abortions or miscarriages.  Used birth control pills for 8 years.  Did not use hormone replacement therapy after menopause.  Breast-fed 1 child for 7 months.  Past Surgical History:   Procedure Laterality Date    HERNIA REPAIR      SIMPLE MASTECTOMY W/ SENTINEL NODE BIOPSY Right 2022    UMBILICAL HERNIA REPAIR N/A 1966      Social History     Socioeconomic History    Marital status:    Tobacco Use    Smoking status: Former     Packs/day: 0.50     Years: 22.00     Pack years: 11.00     Types: Cigarettes    Smokeless tobacco: Never   Substance and Sexual Activity    Alcohol use: Yes     Alcohol/week: 1.0 standard drink     Types: 1 Glasses of wine per week     Comment: Pt. states she drinks 1/2 pint a day on weekends    Drug use: Yes     Types: Marijuana    Sexual activity: Yes     Partners: Male      Family History   Problem Relation Age of Onset    Stroke Mother     Hypertension Mother     Diabetes Mother     Hypertension Father     Prostate cancer Father         metastatic    Heart disease Father     Hypertension  Sister     Diabetes Sister     Prostate cancer Brother 50    Prostate cancer Brother 57    Breast cancer Paternal Aunt     Breast cancer Paternal Aunt     Breast cancer Paternal Aunt     Breast cancer Paternal Aunt     Breast cancer Paternal Cousin         Reason for Follow-up:  -IDC right breast, triple negative, S/P right mastectomy 04/11/2022, pT2 pN0 MX, G3, AJCC anatomic stage IIA, pathological prognostic stage IIA  -family history of prostate cancer  -genetic testing:  positive for deleterious mutation: BRCA2 p.* ([id number].[id number]>A).     History of Present Illness:   No chief complaint on file.        Oncologic/Hematologic History:  Oncology History   Infiltrating ductal carcinoma of right breast   4/11/2022 Cancer Staged    Staging form: Breast, AJCC 8th Edition  - Pathologic stage from 4/11/2022: Stage IIA (pT2, pN0, cM0, G3, ER-, IA-, HER2-)       5/1/2022 Initial Diagnosis    Malignant neoplasm of right breast in female, estrogen receptor negative     5/30/2022 - 7/13/2022 Chemotherapy    Treatment Summary   Plan Name: OP BREAST DOSE-DENSE AC - DOXORUBICIN CYCLOPHOSPHAMIDE Q2W  Treatment Goal: Curative  Status: Inactive  Start Date: 5/30/2022  End Date: 7/13/2022  Provider: ULICES Pena  Chemotherapy: DOXOrubicin chemo injection 96 mg, 60 mg/m2 = 96 mg, Intravenous, Clinic/HOD 1 time, 4 of 4 cycles  Administration: 96 mg (5/30/2022), 96 mg (6/13/2022), 96 mg (6/28/2022), 96 mg (7/12/2022)  cyclophosphamide 600 mg/m2 = 960 mg in sodium chloride 0.9% 250 mL chemo infusion, 600 mg/m2 = 960 mg, Intravenous, Clinic/HOD 1 time, 4 of 4 cycles  Administration: 960 mg (5/30/2022), 960 mg (6/13/2022), 960 mg (6/28/2022), 960 mg (7/12/2022)       7/26/2022 - 7/26/2022 Chemotherapy    Treatment Summary   Plan Name: OP BREAST PACLITAXEL Q2W  Treatment Goal: Curative  Status: Inactive  Start Date:   End Date:   Provider: ULICES Pena  Chemotherapy: PACLitaxeL (TAXOL) 175 mg/m2 = 288 mg in sodium chloride  0.9% 500 mL chemo infusion, 175 mg/m2 = 288 mg, Intravenous, Clinic/HOD 1 time, 0 of 4 cycles       8/1/2022 - 8/1/2022 Chemotherapy    Treatment Summary   Plan Name: OP BREAST PACLITAXEL Q2W  Treatment Goal: Curative  Status: Inactive  Start Date:   End Date:   Provider: Derick Su MD  Chemotherapy: PACLitaxeL (TAXOL) 175 mg/m2 = 288 mg in sodium chloride 0.9% 500 mL chemo infusion, 175 mg/m2, Intravenous, Clinic/HOD 1 time, 0 of 4 cycles       8/1/2022 -  Chemotherapy    Treatment Summary   Plan Name: OP BREAST PACLITAXEL Q2W  Treatment Goal: Curative  Status: Active  Start Date: 8/1/2022  End Date: 9/13/2022  Provider: Derick Su MD  Chemotherapy: PACLitaxeL (TAXOL) 288 mg in sodium chloride 0.9% 500 mL chemo infusion, 288 mg, Intravenous, Clinic/HOD 1 time, 4 of 4 cycles  Administration: 288 mg (8/1/2022), 288 mg (8/15/2022), 288 mg (8/29/2022), 288 mg (9/12/2022)       8/10/2022 - 8/10/2022 Chemotherapy    Treatment Summary   Plan Name: OP BREAST PACLITAXEL Q2W  Treatment Goal: Curative  Status: Inactive  Start Date:   End Date:   Provider: ULICES Pena  Chemotherapy: PACLitaxeL (TAXOL) 175 mg/m2 = 288 mg in sodium chloride 0.9% 500 mL chemo infusion, 175 mg/m2, Intravenous, Clinic/HOD 1 time, 0 of 4 cycles       #IDC right breast, triple negative:   -03/02/2022: Bilateral diagnostic mammogram (chest wall pain)   -03/08/2022: Needle core biopsy   -04/11/2022: Right simple mastectomy and SLN biopsy   -2.2 cm, grade 3, no LVI, margins negative, 4 lymph nodes negative (including 3 negative SLN's)   >> pT2 pN0 MX, G3, triple negative, AJCC anatomic stage IIA, pathological prognostic stage IIA  -04/28/2022: FSH level 51.03 mIU/mL (postmenopausal); estradiol level <24 pg/mL  -05/06/2022:  TTE:  LVEF 55%  -05/23/2022:  Left subclavian MediPort placed  -cycle 1 adjuvant ddAC 05/30/2022  -cycle 2 adjuvant ddAC 06/13/2022  -cycle 3 of adjuvant ddAC 06/28/2022  -cycle 4 of adjuvant ddAC  07/12/2022  -S/P adjuvant dose dense Taxol:  08/01/2022, 08/15/2022, 08/29/2022, 09/12/2022  -09/25/2022:  Cervical Pap smear:  NIL; negative for high-risk HPV 16, 18/45  -10/05/2022: Dental clearance obtained  -genetic testing:  positive for deleterious mutation: BRCA2 p.* (c.8969G>A).   -11/01/2022:  Started Zometa 4 mg IV (every 6 months x3-5 years) (for risk reduction of distant metastasis)  -11/18/2022:  Bilateral screening mammography with tomosynthesis (comparison:  03/18/2022 mammogram): Both breasts benign, BI-RADS 2 (continue annual screening mammography with digital breast tomosynthesis)        04/28/2022:  Presents for initial medical oncology consultation.    Interval History:  PORT FLUSH   OP BREAST PACLITAXEL Q2W   12/01/2022:  -11/01/2022:  Started Zometa 4 mg IV (every 6 months x3-5 years) (for risk reduction of distant metastasis)  -11/18/2022:  Bilateral screening mammography with tomosynthesis (comparison:  03/18/2022 mammogram): Both breasts benign, BI-RADS 2 (continue annual screening mammography with digital breast tomosynthesis).  Presents for a follow-up visit.  In cheerful spirits.  Says that prophylactic left mastectomy is due on the 14th of this month.  No weakness, fatigue, malaise, chest pain, dyspnea, anorexia, etc..  We discussed initiation of adjuvant olaparib for 1 year.      Medications:  Current Outpatient Medications on File Prior to Visit   Medication Sig Dispense Refill    amLODIPine (NORVASC) 10 MG tablet Take 1 tablet (10 mg total) by mouth once daily. 90 tablet 0    hydroCHLOROthiazide (MICROZIDE) 12.5 mg capsule Take 1 capsule (12.5 mg total) by mouth once daily. 90 capsule 0     No current facility-administered medications on file prior to visit.         Review of Systems:   All systems reviewed and found to be negative except for the symptoms detailed above      Physical Examination:   VITAL SIGNS: There were no vitals filed for this visit.  GENERAL:  In no apparent  distress.    HEAD:  No signs of head trauma.  EYES:  Pupils are equal.  Extraocular motions intact.    EARS:  Hearing grossly intact.  MOUTH:  Oropharynx is normal.   NECK:  No adenopathy, no JVD.     CHEST:  Chest with clear breath sounds bilaterally.  No wheezes, rales, rhonchi.    CARDIAC:  Regular rate and rhythm.  S1 and S2, without murmurs, gallops, rubs.  VASCULAR:  No Edema.  Peripheral pulses normal and equal in all extremities.  ABDOMEN:  Soft, without detectable tenderness.  No sign of distention.  No   rebound or guarding, and no masses palpated.   Bowel Sounds normal.  MUSCULOSKELETAL:  Good range of motion of all major joints. Extremities without clubbing, cyanosis or edema.    NEUROLOGIC EXAM:  Alert and oriented x 3.  No focal sensory or strength deficits.   Speech normal.  Follows commands.  PSYCHIATRIC:  Mood normal.    No results for input(s): CBC in the last 72 hours.   No results for input(s): CMP in the last 72 hours.     Assessment:  Problem List Items Addressed This Visit    None   #IDC right breast, triple negative:  To summarize:  IDC right breast, triple negative  Right simple mastectomy 04/11/2022  2.2 cm, grade 3, no LVI, margins negative, 4 lymph nodes negative  T2 pN0 MX, G3, triple negative, AJCC anatomic stage IIA, pathological prognostic stage IIA  Postmenopausal per labs (04/28/2022)  S/P adjuvant ddAC x4, then dose dense Taxol x4 (05/30/2022-09/12/2022)   -genetic testing:  positive for deleterious mutation: BRCA2 p.* (c.8990G>A).   -11/01/2022:  Started Zometa 4 mg IV (every 6 months x3-5 years) (for risk reduction of distant metastasis)  -11/18/2022:  Bilateral screening mammography with tomosynthesis (comparison:  03/18/2022 mammogram): Both breasts benign, BI-RADS 2 (continue annual screening mammography with digital breast tomosynthesis)       Plan:  -postmenopausal (per labs 04/28/2022)     S/P adjuvant ddAC x4, then adjuvant dose dense Taxol x4  (05/30/2022-09/12/2022)    pT2 pN0; S/P mastectomy   Does not need adjuvant radiotherapy    Genetic testing + for deleterious BRCA2 mutation  Will benefit from 1 year of adjuvant olaparib (triple negative pT2 disease)  Olaparib 300 mg p.o. twice daily for 1 year or until disease progression or unacceptable toxicity, whichever occurs 1st  May be taken with or without food  Monitor for myelodysplastic syndrome/acute myeloid leukemia; pneumonitis; nausea, fatigue, cytopenias, etc.      -Consider adjuvant bisphosphonate therapy for risk reduction of distant metastasis for 3-5 years in postmenopausal patients (natural or induced) with high risk node-negative or node positive tumors  -postmenopausal per labs 04/28/2022  -10/05/2022: Dental clearance obtained  -adjuvant Zometa started 11/01/2022  >>>  Continue Zometa 4 mg IV every 6 months x3-5 years (until 11/2027)    BRCA2 mutation +  Already S/P right mastectomy for breast cancer   Will benefit from prophylactic left mastectomy; refer to surgery   Will benefit from risk reduction BSO; refer to gyn  Family counseling deferred to     Continue breast cancer surveillance  -History and physical exam 1-4 times per year for 5 years (04/2022-04/2027), then annually;  -Mammogram every 12 months, with no clear advantage to shorter interval imaging;  -Educate, monitor, and referred for lymphedema management  -In the absence of clinical signs or symptoms suggestive of recurrent disease, there is no indication for laboratory or imaging studies for metastasis screening;  -Active lifestyle, healthy diet, limited alcohol intake, and achieving and maintaining an ideal body weight (20-25 BMI) may lead to optimal breast cancer outcomes.  >>>  -already S/P right mastectomy 04/11/2022; will need screening mammography with tomosynthesis in 1 year (11/2023);  -if, in the meantime, undergoes prophylactic left mastectomy as well (BRCA2 +), then, there would be no role of surveillance  mammography    Follow-up with NP in a week for chemotherapy teaching (about olaparib).      Above discussed at length with the patient.  All questions answered.  Discussed the rationale and potential side effects of olaparib, and gave her educational materials from [employer].  Discussed the implications of BRCA2 mutation.  She understands and agrees with this plan.  -----------------      Discussion:  Olaparib:   Dose: 300 mg p.o. twice daily with or without food.    For moderate renal impairment (creatinine clearance 31-50 mL/min), reduced dose to 200 mg twice daily.   For adverse reactions, consider dose interruption or dose reduction for recommendations.     Warnings and precautions:   1.  Myelodysplastic syndrome/acute myeloid leukemia in less than 1.5% of patients;   2.  Pneumonitis occurs in less than 1% of patients;   3.  Embryo fetal toxicity     Adverse reactions:   Most common adverse reactions (greater than or equal 20%) were anemia, nausea, fatigue, vomiting, neutropenia, leukopenia, nasopharyngitis/URI/influenza, respiratory tract infection, diarrhea, arthralgia/myalgia/dysgeusia, headache, dyspepsia, decreased appetite, constipation and stomatitis.   Most common laboratory abnormalities (in 25% or greater) were anemia, increase in MCV, decrease in lymphocytes, decrease in leukocytes, decrease in absolute neutrophil count, increasing serum creatinine, and decrease in platelets.    Follow-up:  No follow-ups on file.

## 2022-12-01 NOTE — Clinical Note
Orders for today:   Start olaparib 300 mg p.o. b.i.d. for 1 year  Olaparib teaching by nursing staff within a week.    Continue Zometa 4 mg every 6 months x5 years (next, May 2022)  Referred to surgery for prophylactic left mastectomy  Referred to gyn for risk reduction bilateral salpingo-oophorectomy Family counseling in respect of BRCA2 mutation, deferred to   Follow-up with NP within a week for teaching about olaparib.

## 2022-12-01 NOTE — PROGRESS NOTES
I have reviewed the notes, assessments, and/or procedures performed by the resident, I concur with her/his documentation of Cookie Ugalde.     Sofie Powers MD    Recent mammogram BIRADS2; agree with prophylactic Left Mastectomy

## 2022-12-07 ENCOUNTER — OFFICE VISIT (OUTPATIENT)
Dept: HEMATOLOGY/ONCOLOGY | Facility: CLINIC | Age: 62
End: 2022-12-07
Payer: MEDICAID

## 2022-12-07 VITALS
RESPIRATION RATE: 20 BRPM | OXYGEN SATURATION: 100 % | TEMPERATURE: 98 F | BODY MASS INDEX: 24.63 KG/M2 | DIASTOLIC BLOOD PRESSURE: 97 MMHG | HEIGHT: 63 IN | HEART RATE: 90 BPM | WEIGHT: 139 LBS | SYSTOLIC BLOOD PRESSURE: 147 MMHG

## 2022-12-07 DIAGNOSIS — Z15.01 BRCA2 GENE MUTATION POSITIVE: ICD-10-CM

## 2022-12-07 DIAGNOSIS — C50.911 INFILTRATING DUCTAL CARCINOMA OF RIGHT BREAST: Primary | ICD-10-CM

## 2022-12-07 DIAGNOSIS — Z15.09 BRCA2 GENE MUTATION POSITIVE: ICD-10-CM

## 2022-12-07 PROCEDURE — 1160F RVW MEDS BY RX/DR IN RCRD: CPT | Mod: CPTII,,, | Performed by: NURSE PRACTITIONER

## 2022-12-07 PROCEDURE — 1160F PR REVIEW ALL MEDS BY PRESCRIBER/CLIN PHARMACIST DOCUMENTED: ICD-10-PCS | Mod: CPTII,,, | Performed by: NURSE PRACTITIONER

## 2022-12-07 PROCEDURE — 99213 OFFICE O/P EST LOW 20 MIN: CPT | Mod: PBBFAC | Performed by: NURSE PRACTITIONER

## 2022-12-07 PROCEDURE — 3080F PR MOST RECENT DIASTOLIC BLOOD PRESSURE >= 90 MM HG: ICD-10-PCS | Mod: CPTII,,, | Performed by: NURSE PRACTITIONER

## 2022-12-07 PROCEDURE — 1159F PR MEDICATION LIST DOCUMENTED IN MEDICAL RECORD: ICD-10-PCS | Mod: CPTII,,, | Performed by: NURSE PRACTITIONER

## 2022-12-07 PROCEDURE — 3077F SYST BP >= 140 MM HG: CPT | Mod: CPTII,,, | Performed by: NURSE PRACTITIONER

## 2022-12-07 PROCEDURE — 1159F MED LIST DOCD IN RCRD: CPT | Mod: CPTII,,, | Performed by: NURSE PRACTITIONER

## 2022-12-07 PROCEDURE — 3080F DIAST BP >= 90 MM HG: CPT | Mod: CPTII,,, | Performed by: NURSE PRACTITIONER

## 2022-12-07 PROCEDURE — 3077F PR MOST RECENT SYSTOLIC BLOOD PRESSURE >= 140 MM HG: ICD-10-PCS | Mod: CPTII,,, | Performed by: NURSE PRACTITIONER

## 2022-12-07 PROCEDURE — 3008F PR BODY MASS INDEX (BMI) DOCUMENTED: ICD-10-PCS | Mod: CPTII,,, | Performed by: NURSE PRACTITIONER

## 2022-12-07 PROCEDURE — 99215 OFFICE O/P EST HI 40 MIN: CPT | Mod: S$PBB,,, | Performed by: NURSE PRACTITIONER

## 2022-12-07 PROCEDURE — 99215 PR OFFICE/OUTPT VISIT, EST, LEVL V, 40-54 MIN: ICD-10-PCS | Mod: S$PBB,,, | Performed by: NURSE PRACTITIONER

## 2022-12-07 PROCEDURE — 3008F BODY MASS INDEX DOCD: CPT | Mod: CPTII,,, | Performed by: NURSE PRACTITIONER

## 2022-12-07 NOTE — PROGRESS NOTES
Problem List:   IDC Right Breast, Triple Negative; follow up     Current Treatment:  Olaparib 300 mg p.o. twice daily for 1 year or until disease progression or unacceptable toxicity, whichever occurs 1st  Start date pending    Zometa IV Q 6 months   Started 2022    Treatment History:  -Mediport placed on 2022  -AC x4 cycles (2022-2022)  -DD Taxol Q2 weeks x4 cycles (2022-2022)     Past medical history: Tobacco abuse. Hypertension. Arthritis of right shoulder.  Procedure/surgical history:  Umbilical hernia repair .  Social history: . Lives in [place] [id number]. Has 4 children. Works as a  in a [employer]. Has been smoking 5 cigarettes daily for 15 years. Drinks half a pint of liquor over the weekend. No illicit drugs  Menstrual and OB/GYN history: Menarche, age 14. Menopause, late 40s. First child at age 26 years. No abortions or miscarriages. Used birth control pills for 8 years. Did not use hormone replacement therapy after menopause. Breast-fed 1 child for 7 months.    Family history:  2 brothers experienced prostate cancer in their 50s  Father  from prostate cancer in his 60s-70s  No family history of breast cancer.    Current Treatment:  DDAC x q2 wks x 4 cycles; C1D1 on 22    Treatment History:  2022: Right mastectomy, right SLN biopsy    History of Present Illness:  61 -year-old female referred from surgery clinic, with newly diagnosed right breast cancer.  She never palpated breast mass. She noticed breast/chest wall pain after extensive muscle use in 2021. At that time, she was diagnosed with musculoskeletal pain. In January, she went back to emergency department when symptoms did not resolve. At that time, physical examination apparently noted irregularity in breast and she was referred for further imaging and biopsy revealing for triple negative breast cancer.     Interval history 2022: Patient presents today alone  for scheduled teaching for Lynjaylin. Discussed treatment regimen and potential side effects. We also discussed treatment options for any presenting symptoms.     ROS:  Constitutional: No fever, chills, weight loss, weakness or fatigue  Eye: No visual disturbances or changes in vision, no double vision  ENMT: no decreased hearing, nasal congestion, nosebleeds, sore throat, taste disturbance, tinnitus, vertigo  Respiratory: No shortness of breath, cough, sputum production, hemoptysis or pleuritic type chest pain  Cardiovascular: No chest pain, palpitations, syncope, leg swelling  Gastrointestinal: No nausea, vomiting, diarrhea, constipation, heartburn, abdominal pain  Genitourinary: No CVA tenderness  Hematology/lymph: no abnormal bruising, hemorrhage, petechiae swollen lymph glands  Musculoskeletal: No back or neck pain, joint pain, muscle pain or decreased range of motion  Integumentary: No rash, pruritus, skin lesion or any other significant skin complaints  Neurologic: alert and oriented x4, no abnormal balance, confusion, numbness, tingling, headache  Psychiatric: No anxiety, depression, suicidal or homicidal ideations  12 point ROS done in full with pertinent positives as described in interval history. Remainder of ROS are negative.     Patient Instructions  Discussed:  Goals of therapy to be:  Premedication  Chemotherapy agents  Chemotherapy education:   Discussed common side effects to include nausea and vomiting, which is quite manageable, alopecia, taste changes, mouth ulcers, fatigue, fertility issues, low blood counts that may require transfusion, peripheral neuropathy 42% to 70%; grade 3/4: Less than 7% arthralgia and or myalgias, and rare side effects including cardiac toxicity.  Discussed chemotherapy (Lynparza) to be given every day for 1 year.  Notify our office for any problems or concerns, specifically shortness of breath, swelling, chest pain or fast heartbeat, intractable pain or nausea and  vomiting, fever greater than 100.4, extreme fatigue or weakness.  Self-care tips include good hygiene in frequent handwashing to avoid infection, anti-nausea medicines to reduce nausea, soft toothbrush and mouth rinses 3 times a day with 1 teaspoon of baking soda with 8 ounces of water to prevent and treat mouth sores, avoid contact sports, avoid sun exposure and apply sunblock with SPF 30 or higher, drink 2-3 quarts of water every 24 hours, and maintaining good nutrition.  Short term and long term effects of treatment  Signs and symptoms to call clinic--->Notify our office for any problems or concerns, specifically shortness of breath, swelling, chest pain or fast heartbeat, intractable pain or nausea and vomiting, fever greater than 100.4, extreme fatigue or weakness.  Written material given to patient   All questions answered; patient verbalized understanding.    Review of Systems: Negative as otherwise stated in HPI    Physical Exam:  Vitals & Measurements  Vitals:    12/07/22 1309   BP: (!) 147/97   Pulse: 90   Resp: 20   Temp: 97.7 °F (36.5 °C)       Assessment:  #IDC right breast, triple negative:  To summarize:  IDC right breast, triple negative  Right simple mastectomy 04/11/2022  2.2 cm, grade 3, no LVI, margins negative, 4 lymph nodes negative  T2 pN0 MX, G3, triple negative, AJCC anatomic stage IIA, pathological prognostic stage IIA  Postmenopausal per labs (04/28/2022)  S/P adjuvant ddAC x4, then dose dense Taxol x4 (05/30/2022-09/12/2022)   -genetic testing:  positive for deleterious mutation: BRCA2 p.* (c.8969G>A).   -11/01/2022:  Started Zometa 4 mg IV (every 6 months x3-5 years) (for risk reduction of distant metastasis)  -11/18/2022:  Bilateral screening mammography with tomosynthesis (comparison:  03/18/2022 mammogram): Both breasts benign, BI-RADS 2 (continue annual screening mammography with digital breast tomosynthesis)    Plan:  -postmenopausal (per labs 04/28/2022)     S/P adjuvant ddAC  x4, then adjuvant dose dense Taxol x4 (05/30/2022-09/12/2022)     pT2 pN0; S/P mastectomy   Does not need adjuvant radiotherapy     Genetic testing + for deleterious BRCA2 mutation  Will benefit from 1 year of adjuvant olaparib (triple negative pT2 disease)  Olaparib 300 mg p.o. twice daily for 1 year or until disease progression or unacceptable toxicity, whichever occurs 1st  May be taken with or without food  Monitor for myelodysplastic syndrome/acute myeloid leukemia; pneumonitis; nausea, fatigue, cytopenias, etc.      -Consider adjuvant bisphosphonate therapy for risk reduction of distant metastasis for 3-5 years in postmenopausal patients (natural or induced) with high risk node-negative or node positive tumors  -postmenopausal per labs 04/28/2022  -10/05/2022: Dental clearance obtained  -adjuvant Zometa started 11/01/2022  >>>  Continue Zometa 4 mg IV every 6 months x3-5 years (until 11/2027)-Scheduled for 5/1/2023     Left mastectomy surgery date pending   Will benefit from risk reduction BSO; surgery date pending  Family counseling deferred to      Continue breast cancer surveillance  -History and physical exam 1-4 times per year for 5 years (04/2022-04/2027), then annually;  -Mammogram every 12 months, with no clear advantage to shorter interval imaging;  -Educate, monitor, and referred for lymphedema management  -In the absence of clinical signs or symptoms suggestive of recurrent disease, there is no indication for laboratory or imaging studies for metastasis screening;  -Active lifestyle, healthy diet, limited alcohol intake, and achieving and maintaining an ideal body weight (20-25 BMI) may lead to optimal breast cancer outcomes.  >>>  -already S/P right mastectomy 04/11/2022; will need screening mammography with tomosynthesis in 1 year (11/2023);  -if, in the meantime, undergoes prophylactic left mastectomy as well (BRCA2 +), then, there would be no role of surveillance mammography    Follow  up with NP in 4 weeks with lab work   May start Lynparza once receive from pharmacy  [facility] flush Q 2-3 months due in 2/2023    Above discussed at length with the patient.  All questions answered.  Discussed the rationale and potential side effects of olaparib, and gave her educational materials from [facility].    -----------------  Discussion:  Olaparib:   Dose: 300 mg p.o. twice daily with or without food.    For moderate renal impairment (creatinine clearance 31-50 mL/min), reduced dose to 200 mg twice daily.   For adverse reactions, consider dose interruption or dose reduction for recommendations.      Warnings and precautions:   1.  Myelodysplastic syndrome/acute myeloid leukemia in less than 1.5% of patients;   2.  Pneumonitis occurs in less than 1% of patients;   3.  Embryo fetal toxicity      Adverse reactions:   Most common adverse reactions (greater than or equal 20%) were anemia, nausea, fatigue, vomiting, neutropenia, leukopenia, nasopharyngitis/URI/influenza, respiratory tract infection, diarrhea, arthralgia/myalgia/dysgeusia, headache, dyspepsia, decreased appetite, constipation and stomatitis.   Most common laboratory abnormalities (in 25% or greater) were anemia, increase in MCV, decrease in lymphocytes, decrease in leukocytes, decrease in absolute neutrophil count, increasing serum creatinine, and decrease in platelets.

## 2022-12-08 ENCOUNTER — ANESTHESIA EVENT (OUTPATIENT)
Dept: SURGERY | Facility: HOSPITAL | Age: 62
End: 2022-12-08
Payer: MEDICAID

## 2022-12-08 ENCOUNTER — DOCUMENTATION ONLY (OUTPATIENT)
Dept: HEMATOLOGY/ONCOLOGY | Facility: CLINIC | Age: 62
End: 2022-12-08
Payer: MEDICAID

## 2022-12-08 NOTE — ANESTHESIA PREPROCEDURE EVALUATION
12/08/2022  Cookie Ugalde is a 62 y.o., female.    COVID STATUS: VACCINE X 3 (PFIZER, LASTLY 11/5/21); 8/24/22 COVID + (REFUSED MONOCLONAL Ab INFUSION)  BETA-BLOCKER: NONE    PAT NURSE PHONE INTERVIEW 12/12/22    PROBLEM LIST:  -  BRCA 2+; RIGHT BREAST IDC (Dx 3/8/22) - CHEMO 5/30-7/12/22, 8/1-9/12/22, ZOMETA IV Q 6mos. 11/1/22 (TO CONTINUE UNTIL 11/2027)     - S/P 4/11/22 RIGHT SIMPLE MASTECTOMY, SLNBx     - S/P 5/23/22 MEDIPORT  -  HTN  -  HLD (NO MEDs)  -  OA - RIGHT SHOULDER  -  PAST SMOKER - QUIT 5/2022, 10+PPY; COPD, LLL PULM. NODULE per 11/29/13 CTA CHEST  -  PAST ETOH (LASTLY 5/2022)- PREV. 1/2 PINT on WEEKENDS  -  MARIJUANA USE    Vitals:    12/14/22 1010 12/14/22 1100 12/14/22 1112 12/14/22 1200   BP: (!) 133/99 135/84  (!) 141/87   BP Location: Left arm   Right leg   Patient Position: Lying      Pulse: 85 79  87   Resp: 16 16 16 17   Temp: 36.2 °C (97.2 °F)      TempSrc: Oral      SpO2: 99% 100%  99%   Weight:           Lab Results   Component Value Date    WBC 7.5 11/01/2022    HGB 13.3 11/01/2022    HCT 41.1 11/01/2022     11/01/2022    CHOL 250 (H) 09/13/2022    TRIG 105 09/13/2022    HDL 45 09/13/2022    ALT 13 11/01/2022    AST 20 11/01/2022     11/01/2022    K 4.6 11/01/2022    CREATININE 0.79 11/01/2022    BUN 14.4 11/01/2022    CO2 27 11/01/2022    HGBA1C 5.1 09/13/2022     AM Rx DOS: AMLODIPINE, LYNPARZA    ORDERS -   SURGEON: 11/29/13 CTA CHEST; 4/8/22 EKG; 11/1/22 CBC, CMP; NO NEW ORDERS  ANESTHESIA: NONE    Pre-op Assessment    I have reviewed the Patient Summary Reports.     I have reviewed the Nursing Notes. I have reviewed the NPO Status.   I have reviewed the Medications.     Review of Systems  Anesthesia Hx:  No problems with previous Anesthesia  History of prior surgery of interest to airway management or planning: Denies Family Hx of Anesthesia complications.    Denies Personal Hx of Anesthesia complications.   Social:  Smoker    Hematology/Oncology:  Hematology Normal   Oncology Normal     EENT/Dental:EENT/Dental Normal   Cardiovascular:   Hypertension    Pulmonary:   COPD    Renal/:  Renal/ Normal     Hepatic/GI:  Hepatic/GI Normal    Musculoskeletal:  Musculoskeletal Normal    Neurological:  Neurology Normal    Endocrine:  Endocrine Normal    Dermatological:  Skin Normal    Psych:  Psychiatric Normal           Physical Exam  General: Well nourished, Cooperative, Alert and Oriented    Airway:  Mallampati: I / I  Mouth Opening: Normal  TM Distance: Normal  Tongue: Normal  Neck ROM: Normal ROM    Dental:  Intact        Anesthesia Plan  Type of Anesthesia, risks & benefits discussed:    Anesthesia Type: Gen Supraglottic Airway  Intra-op Monitoring Plan: Standard ASA Monitors  Post Op Pain Control Plan: IV/PO Opioids PRN  Induction:  IV  Airway Plan: Direct  Informed Consent: Informed consent signed with the Patient representative and all parties understand the risks and agree with anesthesia plan.  All questions answered. Patient consented to blood products? No  ASA Score: 3  Day of Surgery Review of History & Physical: H&P Update referred to the surgeon/provider.    Ready For Surgery From Anesthesia Perspective.     .    Lab Results   Component Value Date    WBC 7.5 11/01/2022    HGB 13.3 11/01/2022    HCT 41.1 11/01/2022    MCV 93.2 11/01/2022     11/01/2022     CMP  Sodium Level   Date Value Ref Range Status   11/01/2022 143 136 - 145 mmol/L Final     Potassium Level   Date Value Ref Range Status   11/01/2022 4.6 3.5 - 5.1 mmol/L Final     Carbon Dioxide   Date Value Ref Range Status   11/01/2022 27 23 - 31 mmol/L Final     Blood Urea Nitrogen   Date Value Ref Range Status   11/01/2022 14.4 9.8 - 20.1 mg/dL Final     Creatinine   Date Value Ref Range Status   11/01/2022 0.79 0.55 - 1.02 mg/dL Final     Calcium Level Total   Date Value Ref Range Status    11/01/2022 9.9 8.4 - 10.2 mg/dL Final     Albumin Level   Date Value Ref Range Status   11/01/2022 4.2 3.4 - 4.8 gm/dL Final     Bilirubin Total   Date Value Ref Range Status   11/01/2022 0.6 <=1.5 mg/dL Final     Alkaline Phosphatase   Date Value Ref Range Status   11/01/2022 83 40 - 150 unit/L Final     Aspartate Aminotransferase   Date Value Ref Range Status   11/01/2022 20 5 - 34 unit/L Final     Alanine Aminotransferase   Date Value Ref Range Status   11/01/2022 13 0 - 55 unit/L Final     eGFR   Date Value Ref Range Status   11/01/2022 >60 mls/min/1.73/m2 Final         5/6/22 ECHO   The left ventricle is normal in size with normal systolic function.   The estimated ejection fraction is 55%.   Grade I left ventricular diastolic dysfunction.   Normal right ventricular size with normal right ventricular systolic function.   Mild pulmonic regurgitation.   IVC normal.   There is no pulmonary hypertension.    11/29/13 CTA CHEST      5/23/22 ANESTHESIA

## 2022-12-08 NOTE — NURSING
Spoke with pharmacist at patient's local pharmacy, [facility], who advised that the patient does not have any co-pay and that the medication will be filled/shipped by [facility] specialty pharmacy. Informed patient of above. Patient agreed to contact [facility] if has not received medication by next week.

## 2022-12-12 ENCOUNTER — OFFICE VISIT (OUTPATIENT)
Dept: FAMILY MEDICINE | Facility: CLINIC | Age: 62
End: 2022-12-12
Payer: MEDICAID

## 2022-12-12 VITALS
RESPIRATION RATE: 18 BRPM | HEART RATE: 78 BPM | OXYGEN SATURATION: 100 % | BODY MASS INDEX: 24.83 KG/M2 | SYSTOLIC BLOOD PRESSURE: 138 MMHG | DIASTOLIC BLOOD PRESSURE: 90 MMHG | HEIGHT: 62 IN | WEIGHT: 134.94 LBS | TEMPERATURE: 98 F

## 2022-12-12 DIAGNOSIS — Z12.11 ENCOUNTER FOR SCREENING COLONOSCOPY: Primary | ICD-10-CM

## 2022-12-12 DIAGNOSIS — M79.10 MUSCLE PAIN: ICD-10-CM

## 2022-12-12 DIAGNOSIS — M54.9 BACK PAIN, UNSPECIFIED BACK LOCATION, UNSPECIFIED BACK PAIN LATERALITY, UNSPECIFIED CHRONICITY: ICD-10-CM

## 2022-12-12 PROCEDURE — 99214 OFFICE O/P EST MOD 30 MIN: CPT | Mod: PBBFAC | Performed by: NURSE PRACTITIONER

## 2022-12-12 RX ORDER — LIDOCAINE 50 MG/G
1 PATCH TOPICAL DAILY
Qty: 30 PATCH | Refills: 2 | Status: SHIPPED | OUTPATIENT
Start: 2022-12-12 | End: 2023-01-11

## 2022-12-12 NOTE — PROGRESS NOTES
Family Medicine Clinic Note:    PCP: Georgina Lang MD    Cookie Ugalde is a 62 y.o. female with a history of IDC right breast, triple negative (Sees Dr. Hansen), right simple mastectomy 04/11/2022, currently on Olaparib 300 mg p.o. twice daily x 1 year and on Zometa 4 mg IV every 6 months x -5 years, and BRCA 2 positive , PMB, presents to clinic today for f/up regarding chronic diseases      Subjective:   Acute   Pain between the shoulders 2/2 to reported OA  Takes the alleve and helps with the pain  No injury or trauma       IDC  -Saw the Breast surgeon on 12/1/22 and she is to have a prophylactic left mastectomy  on 12/14/22 and referred to GYN for  bilateral oophorectomy due to being  BRCA 2 positive by genetic testing  -Rang the bell 9/13/22 and has completed the chemotherapy      HTN:  Bp today 149/89 , manual 138/90  -Pt attributes that elevated bp due to pain  -On amlodipine 10 mg and HCTZ 12.5 mg daily just took the bp medication in the office   -Reviewed labs on 11/18/22 (CBC,CMP WNL)      HM  Per record:  11/18/2022:  Bilateral screening mammography with tomosynthesis (comparison:  03/18/2022 mammogram): Both breasts benign, BI-RADS 2 (continue annual screening mammography with digital breast tomosynthesis) and due 11/2023  Last PAP 9/13/22 NIL HPV negative   Needs the flue --> will do after the surgery  Needs the shingles vaccine after the suregery  Needs the colonoscoopy    ROS  Constitutional: No fevers, chills or fatigue  Respiratory: no trouble breathing or SOB  Cardiovascular: no chest pain or tightness, no SOB on activity, no ankle swelling  Gastrointestinal: no constipation, no diarrhea, no nausea, no vomiting  Musculoskeletal: no muscle pain, no joint pain    Current Outpatient Medications   Medication Sig Dispense Refill    amLODIPine (NORVASC) 10 MG tablet Take 1 tablet (10 mg total) by mouth once daily. 90 tablet 0    hydroCHLOROthiazide (MICROZIDE) 12.5 mg capsule Take 1  capsule (12.5 mg total) by mouth once daily. 90 capsule 0    olaparib (LYNPARZA) tablet 150 mg Take 2 tablets (300 mg total) by mouth 2 (two) times daily. 120 tablet 6     No current facility-administered medications for this visit.       Objective:     There were no vitals taken for this visit.  BP Readings from Last 3 Encounters:   12/07/22 (!) 147/97   12/01/22 136/79   12/01/22 114/79     Wt Readings from Last 3 Encounters:   12/07/22 63 kg (139 lb)   12/01/22 60 kg (132 lb 4.4 oz)   12/01/22 59.4 kg (131 lb)     No results found for this or any previous visit (from the past 200 hour(s)).  There is no height or weight on file to calculate BMI.    Physical Exam  Constitutional: NAD  Head: Normocephalic, atraumatic  Lungs: CTAB, No crackles, No wheezes  Cardiovascular: Normal heart sounds, No MRG  Abdomen: Soft, Nontender, No palpable hepatosplenomegaly, No abdominal masses      The 10-year ASCVD risk score (Kourtney MANZANARES, et al., 2019) is: 13.7%    Values used to calculate the score:      Age: 62 years      Sex: Female      Is Non- : Yes      Diabetic: No      Tobacco smoker: No      Systolic Blood Pressure: 147 mmHg      Is BP treated: Yes      HDL Cholesterol: 45 mg/dL      Total Cholesterol: 250 mg/dL      Assessment:   Cookie Ugalde is a 62 y.o. female with:    IDC/BRCA 2  HTN  Muscle pain  Plan:   IDC/BRCA 2  -Elective left mastectomy to be done on 2/14/22  -Will contact GYN to make sure that they do oophorectomies  -Olaparib 300 mg p.o. twice daily for 1 year or until disease progression or unacceptable toxicity, whichever occurs 1st per oncology record    HTN  -Pt attributes the pain with arthritis   -No change to bp medications at this time    Muscle pain  -Ordered Lidoderm pain 5 % daily  -Okay to use the heating pad prn to the affected site      HM  Needs the  flu  Needs Shingles vaccine  Sent the colonoscopy referral to Dr. Ye (has medicaid [employer])    RTC 1 month          No  follow-ups on file.    Future Appointments   Date Time Provider Department Center   12/12/2022  9:20 AM RESIDENT 13, [facility] FAMILY MEDICINE [facility] FM RES [facility] Un   12/22/2022 12:40 PM PROVIDER, [facility] BREAST SURGERY [facility] TONY [place] [facility]   1/4/2023 10:30 AM NURSE, [facility] HEMATOLOGY ONCOLOGY [facility] HEMOMC [place] [facility]   1/4/2023 11:30 AM MASON PenaP [facility] HEMOMC [place]   1/24/2023  2:00 PM INFUSION ROOM 531, [facility] INFUSION GH CHEMO [place]   2/13/2023  1:30 PM INFUSION ROOM 530, [facility] CHEMOTHERAP[facility] INFUSION [facility] CHEMO [place] [facility]   5/1/2023  1:00 PM INFUSION ROOM 534, [facility] CHEMOTHERAPY INFUSION [facility] CHEMO [facility] Un       Staff: Dr. Lupe Pugh MD  Family Medicine PGY-2

## 2022-12-14 ENCOUNTER — ANESTHESIA (OUTPATIENT)
Dept: SURGERY | Facility: HOSPITAL | Age: 62
End: 2022-12-14
Payer: MEDICAID

## 2022-12-14 ENCOUNTER — HOSPITAL ENCOUNTER (OUTPATIENT)
Facility: HOSPITAL | Age: 62
Discharge: HOME | End: 2022-12-14
Attending: SURGERY | Admitting: SURGERY
Payer: MEDICAID

## 2022-12-14 DIAGNOSIS — Z15.01 BRCA2 GENE MUTATION POSITIVE: ICD-10-CM

## 2022-12-14 DIAGNOSIS — Z15.09 BRCA2 GENE MUTATION POSITIVE: ICD-10-CM

## 2022-12-14 DIAGNOSIS — Z90.12 S/P LEFT MASTECTOMY: Primary | ICD-10-CM

## 2022-12-14 DIAGNOSIS — Z15.02 BRCA GENE MUTATION POSITIVE IN FEMALE: ICD-10-CM

## 2022-12-14 DIAGNOSIS — Z15.01 BRCA GENE MUTATION POSITIVE IN FEMALE: ICD-10-CM

## 2022-12-14 DIAGNOSIS — Z15.09 BRCA GENE MUTATION POSITIVE IN FEMALE: ICD-10-CM

## 2022-12-14 PROCEDURE — 88307 TISSUE EXAM BY PATHOLOGIST: CPT | Performed by: SURGERY

## 2022-12-14 PROCEDURE — 63600175 PHARM REV CODE 636 W HCPCS: Performed by: STUDENT IN AN ORGANIZED HEALTH CARE EDUCATION/TRAINING PROGRAM

## 2022-12-14 PROCEDURE — 63600175 PHARM REV CODE 636 W HCPCS: Performed by: SPECIALIST

## 2022-12-14 PROCEDURE — 00400 ANES INTEGUMENTARY SYS NOS: CPT | Performed by: SURGERY

## 2022-12-14 PROCEDURE — 71000015 HC POSTOP RECOV 1ST HR: Performed by: SURGERY

## 2022-12-14 PROCEDURE — 37000008 HC ANESTHESIA 1ST 15 MINUTES: Performed by: SURGERY

## 2022-12-14 PROCEDURE — 63600175 PHARM REV CODE 636 W HCPCS: Performed by: NURSE ANESTHETIST, CERTIFIED REGISTERED

## 2022-12-14 PROCEDURE — 36000707: Performed by: SURGERY

## 2022-12-14 PROCEDURE — 25000003 PHARM REV CODE 250: Performed by: SURGERY

## 2022-12-14 PROCEDURE — 71000033 HC RECOVERY, INTIAL HOUR: Performed by: SURGERY

## 2022-12-14 PROCEDURE — 71000016 HC POSTOP RECOV ADDL HR: Performed by: SURGERY

## 2022-12-14 PROCEDURE — 36000706: Performed by: SURGERY

## 2022-12-14 PROCEDURE — 37000009 HC ANESTHESIA EA ADD 15 MINS: Performed by: SURGERY

## 2022-12-14 PROCEDURE — 25000003 PHARM REV CODE 250: Performed by: NURSE ANESTHETIST, CERTIFIED REGISTERED

## 2022-12-14 PROCEDURE — 25000003 PHARM REV CODE 250: Performed by: SPECIALIST

## 2022-12-14 PROCEDURE — C1729 CATH, DRAINAGE: HCPCS | Performed by: SURGERY

## 2022-12-14 RX ORDER — MIDAZOLAM HYDROCHLORIDE 1 MG/ML
INJECTION INTRAMUSCULAR; INTRAVENOUS
Status: DISCONTINUED
Start: 2022-12-14 | End: 2022-12-14 | Stop reason: HOSPADM

## 2022-12-14 RX ORDER — ONDANSETRON 2 MG/ML
4 INJECTION INTRAMUSCULAR; INTRAVENOUS ONCE
Status: DISCONTINUED | OUTPATIENT
Start: 2022-12-14 | End: 2023-07-17

## 2022-12-14 RX ORDER — HYDROMORPHONE HYDROCHLORIDE 1 MG/ML
0.5 INJECTION, SOLUTION INTRAMUSCULAR; INTRAVENOUS; SUBCUTANEOUS EVERY 5 MIN PRN
Status: DISCONTINUED | OUTPATIENT
Start: 2022-12-14 | End: 2023-07-17

## 2022-12-14 RX ORDER — OXYCODONE AND ACETAMINOPHEN 10; 325 MG/1; MG/1
1 TABLET ORAL EVERY 4 HOURS PRN
Qty: 20 TABLET | Refills: 0 | Status: SHIPPED | OUTPATIENT
Start: 2022-12-14 | End: 2022-12-14 | Stop reason: SDUPTHER

## 2022-12-14 RX ORDER — CEFAZOLIN SODIUM 1 G/3ML
INJECTION, POWDER, FOR SOLUTION INTRAMUSCULAR; INTRAVENOUS
Status: DISCONTINUED | OUTPATIENT
Start: 2022-12-14 | End: 2022-12-14

## 2022-12-14 RX ORDER — CEFAZOLIN SODIUM 2 G/50ML
2 SOLUTION INTRAVENOUS
Status: DISCONTINUED | OUTPATIENT
Start: 2022-12-14 | End: 2022-12-14 | Stop reason: HOSPADM

## 2022-12-14 RX ORDER — DIPHENHYDRAMINE HYDROCHLORIDE 50 MG/ML
25 INJECTION INTRAMUSCULAR; INTRAVENOUS ONCE AS NEEDED
Status: COMPLETED | OUTPATIENT
Start: 2022-12-14 | End: 2022-12-14

## 2022-12-14 RX ORDER — HYDROMORPHONE HYDROCHLORIDE 1 MG/ML
0.2 INJECTION, SOLUTION INTRAMUSCULAR; INTRAVENOUS; SUBCUTANEOUS EVERY 5 MIN PRN
Status: DISCONTINUED | OUTPATIENT
Start: 2022-12-14 | End: 2023-07-17

## 2022-12-14 RX ORDER — KETOROLAC TROMETHAMINE 30 MG/ML
INJECTION, SOLUTION INTRAMUSCULAR; INTRAVENOUS
Status: DISCONTINUED | OUTPATIENT
Start: 2022-12-14 | End: 2022-12-14

## 2022-12-14 RX ORDER — OXYCODONE AND ACETAMINOPHEN 10; 325 MG/1; MG/1
1 TABLET ORAL EVERY 4 HOURS PRN
Qty: 20 TABLET | Refills: 0 | Status: SHIPPED | OUTPATIENT
Start: 2022-12-14 | End: 2023-02-06 | Stop reason: CLARIF

## 2022-12-14 RX ORDER — IPRATROPIUM BROMIDE AND ALBUTEROL SULFATE 2.5; .5 MG/3ML; MG/3ML
3 SOLUTION RESPIRATORY (INHALATION) ONCE AS NEEDED
Status: DISCONTINUED | OUTPATIENT
Start: 2022-12-14 | End: 2023-07-17

## 2022-12-14 RX ORDER — SODIUM CHLORIDE 9 MG/ML
INJECTION, SOLUTION INTRAVENOUS CONTINUOUS
Status: DISCONTINUED | OUTPATIENT
Start: 2022-12-14 | End: 2022-12-14 | Stop reason: HOSPADM

## 2022-12-14 RX ORDER — BUPIVACAINE HYDROCHLORIDE 2.5 MG/ML
INJECTION, SOLUTION EPIDURAL; INFILTRATION; INTRACAUDAL
Status: DISCONTINUED
Start: 2022-12-14 | End: 2022-12-14 | Stop reason: HOSPADM

## 2022-12-14 RX ORDER — FENTANYL CITRATE 50 UG/ML
INJECTION, SOLUTION INTRAMUSCULAR; INTRAVENOUS
Status: DISCONTINUED | OUTPATIENT
Start: 2022-12-14 | End: 2022-12-14

## 2022-12-14 RX ORDER — DEXAMETHASONE SODIUM PHOSPHATE 4 MG/ML
INJECTION, SOLUTION INTRA-ARTICULAR; INTRALESIONAL; INTRAMUSCULAR; INTRAVENOUS; SOFT TISSUE
Status: DISCONTINUED | OUTPATIENT
Start: 2022-12-14 | End: 2022-12-14

## 2022-12-14 RX ORDER — HEPARIN SODIUM 5000 [USP'U]/ML
5000 INJECTION, SOLUTION INTRAVENOUS; SUBCUTANEOUS ONCE
Status: COMPLETED | OUTPATIENT
Start: 2022-12-14 | End: 2022-12-14

## 2022-12-14 RX ORDER — ONDANSETRON 2 MG/ML
INJECTION INTRAMUSCULAR; INTRAVENOUS
Status: DISCONTINUED | OUTPATIENT
Start: 2022-12-14 | End: 2022-12-14

## 2022-12-14 RX ORDER — MEPERIDINE HYDROCHLORIDE 25 MG/ML
12.5 INJECTION INTRAMUSCULAR; INTRAVENOUS; SUBCUTANEOUS ONCE
Status: ACTIVE | OUTPATIENT
Start: 2022-12-14 | End: 2022-12-15

## 2022-12-14 RX ORDER — SODIUM CHLORIDE, SODIUM LACTATE, POTASSIUM CHLORIDE, CALCIUM CHLORIDE 600; 310; 30; 20 MG/100ML; MG/100ML; MG/100ML; MG/100ML
INJECTION, SOLUTION INTRAVENOUS CONTINUOUS
Status: DISCONTINUED | OUTPATIENT
Start: 2022-12-14 | End: 2023-07-17

## 2022-12-14 RX ORDER — LIDOCAINE HCL/PF 100 MG/5ML
SYRINGE (ML) INTRAVENOUS
Status: DISCONTINUED | OUTPATIENT
Start: 2022-12-14 | End: 2022-12-14

## 2022-12-14 RX ORDER — OXYCODONE AND ACETAMINOPHEN 5; 325 MG/1; MG/1
2 TABLET ORAL ONCE
Status: COMPLETED | OUTPATIENT
Start: 2022-12-14 | End: 2022-12-14

## 2022-12-14 RX ORDER — MIDAZOLAM HYDROCHLORIDE 1 MG/ML
2 INJECTION INTRAMUSCULAR; INTRAVENOUS ONCE
Status: COMPLETED | OUTPATIENT
Start: 2022-12-14 | End: 2022-12-14

## 2022-12-14 RX ORDER — PROPOFOL 10 MG/ML
INJECTION, EMULSION INTRAVENOUS
Status: DISCONTINUED | OUTPATIENT
Start: 2022-12-14 | End: 2022-12-14

## 2022-12-14 RX ORDER — BUPIVACAINE HYDROCHLORIDE 2.5 MG/ML
INJECTION, SOLUTION EPIDURAL; INFILTRATION; INTRACAUDAL
Status: DISCONTINUED | OUTPATIENT
Start: 2022-12-14 | End: 2022-12-14 | Stop reason: HOSPADM

## 2022-12-14 RX ORDER — LIDOCAINE HYDROCHLORIDE 10 MG/ML
1 INJECTION, SOLUTION EPIDURAL; INFILTRATION; INTRACAUDAL; PERINEURAL ONCE
Status: DISCONTINUED | OUTPATIENT
Start: 2022-12-14 | End: 2023-07-17

## 2022-12-14 RX ORDER — PROCHLORPERAZINE EDISYLATE 5 MG/ML
5 INJECTION INTRAMUSCULAR; INTRAVENOUS ONCE AS NEEDED
Status: DISCONTINUED | OUTPATIENT
Start: 2022-12-14 | End: 2023-07-17

## 2022-12-14 RX ADMIN — FENTANYL CITRATE 50 MCG: 50 INJECTION, SOLUTION INTRAMUSCULAR; INTRAVENOUS at 07:12

## 2022-12-14 RX ADMIN — SODIUM CHLORIDE, POTASSIUM CHLORIDE, SODIUM LACTATE AND CALCIUM CHLORIDE: 600; 310; 30; 20 INJECTION, SOLUTION INTRAVENOUS at 06:12

## 2022-12-14 RX ADMIN — ONDANSETRON 4 MG: 2 INJECTION INTRAMUSCULAR; INTRAVENOUS at 08:12

## 2022-12-14 RX ADMIN — DIPHENHYDRAMINE HYDROCHLORIDE 25 MG: 50 INJECTION INTRAMUSCULAR; INTRAVENOUS at 11:12

## 2022-12-14 RX ADMIN — OXYCODONE HYDROCHLORIDE AND ACETAMINOPHEN 2 TABLET: 5; 325 TABLET ORAL at 11:12

## 2022-12-14 RX ADMIN — HEPARIN SODIUM 5000 UNITS: 5000 INJECTION, SOLUTION INTRAVENOUS; SUBCUTANEOUS at 05:12

## 2022-12-14 RX ADMIN — LIDOCAINE HYDROCHLORIDE 60 MG: 20 INJECTION, SOLUTION INTRAVENOUS at 07:12

## 2022-12-14 RX ADMIN — FENTANYL CITRATE 25 MCG: 50 INJECTION, SOLUTION INTRAMUSCULAR; INTRAVENOUS at 08:12

## 2022-12-14 RX ADMIN — KETOROLAC TROMETHAMINE 30 MG: 30 INJECTION, SOLUTION INTRAMUSCULAR; INTRAVENOUS at 07:12

## 2022-12-14 RX ADMIN — FENTANYL CITRATE 25 MCG: 50 INJECTION, SOLUTION INTRAMUSCULAR; INTRAVENOUS at 07:12

## 2022-12-14 RX ADMIN — SODIUM CHLORIDE, POTASSIUM CHLORIDE, SODIUM LACTATE AND CALCIUM CHLORIDE: 600; 310; 30; 20 INJECTION, SOLUTION INTRAVENOUS at 07:12

## 2022-12-14 RX ADMIN — MIDAZOLAM HYDROCHLORIDE 2 MG: 1 INJECTION, SOLUTION INTRAMUSCULAR; INTRAVENOUS at 06:12

## 2022-12-14 RX ADMIN — PROPOFOL 150 MG: 10 INJECTION, EMULSION INTRAVENOUS at 07:12

## 2022-12-14 RX ADMIN — DEXAMETHASONE SODIUM PHOSPHATE 8 MG: 4 INJECTION, SOLUTION INTRA-ARTICULAR; INTRALESIONAL; INTRAMUSCULAR; INTRAVENOUS; SOFT TISSUE at 07:12

## 2022-12-14 RX ADMIN — CEFAZOLIN 2 G: 330 INJECTION, POWDER, FOR SOLUTION INTRAMUSCULAR; INTRAVENOUS at 07:12

## 2022-12-14 NOTE — OP NOTE
Operation date:  12/14/22    Surgeon:  Dr. Powers    Assisting surgeons:  Miladis Drummond MD PGY3; Satish Moran MD PGY5    Preoperative diagnosis: BRCA2 positive    Postoperative diagnosis: same    Procedure: L Simple Mastectomy with Mediport removal    Anesthesia: General    Technique:The patient was then brought to the operating room and placed supine on the operative table. General anesthesia was induced. The skin of the Left/Right breast was prepped and draped in standard sterile surgical fashion along with the Left axilla and upper arm. A time-out was completed verifying correct patient, procedure, site, positioning and equipment prior to beginning the procedure.     A large elliptical skin incision was made that encompassed the nipple-areola complex on the Left. Flaps were raised in the avascular plane between subcutaneous tissue and breast tissue from the clavicle superiorly, the sternum medially, the anterior rectus sheath inferiorly, and the lateral border of the pectoralis major muscle laterally. Hemostasis was achieved in the flaps. Next, the breast tissue and underlying pectoralis fascia were excised from the pectoralis major muscle, progressing from medial to lateral. At the lateral border of the pectoralis major muscle, the breast tissue was sung laterally and a lateral pedicle identified where breast tissue gave way to fat of axilla. The entirety of the breast tissue was excised while meticulously teased away from pec major.     The cavity was irrigated and hemostasis checked. One miladis drain (round) was placed over the pectoralis. The JPs were secured with 3-0 Nylon suture. The subdermal layer was closed with 3-0 Monocryl interrupted suture. The skin was closed with running 4-0 Monocryl subcuticular suture. Dressings were applied    Findings: grossly normal fibrofatty breast tissue    EBL: 10cc    Specimens: Left breast, mediport    Miladis Drummond MD  [facility] General Surgery PGY3

## 2022-12-14 NOTE — ANESTHESIA POSTPROCEDURE EVALUATION
Anesthesia Post Evaluation    Patient: Cookie Ugalde    Procedure(s) Performed: Procedure(s) (LRB):  MASTECTOMY, SIMPLE (Left)  REMOVAL, VASCULAR ACCESS PORT (Left)    Final Anesthesia Type: general      Patient location during evaluation: PACU  Patient participation: Yes- Able to Participate  Level of consciousness: awake and responds to stimulation  Post-procedure vital signs: reviewed and stable  Pain management: adequate  Airway patency: patent    PONV status at discharge: No PONV  Anesthetic complications: no      Cardiovascular status: blood pressure returned to baseline  Respiratory status: unassisted  Hydration status: euvolemic  Follow-up not needed.          Vitals Value Taken Time   /87 12/14/22 1200   Temp 36.2 °C (97.2 °F) 12/14/22 1010   Pulse 87 12/14/22 1200   Resp 17 12/14/22 1200   SpO2 99 % 12/14/22 1200         Event Time   Out of Recovery 10:05:00         Pain/Timoteo Score: Pain Rating Prior to Med Admin: 5 (12/14/2022 11:12 AM)  Timoteo Score: 10 (12/14/2022 10:27 AM)

## 2022-12-14 NOTE — DISCHARGE INSTRUCTIONS
See attached instrauctions  Keep wrap on for 48 hrs then may remove for shower  Keep upper body dry, no swimming or bathing x 2 weeks  Empty and record drainage as needed

## 2022-12-14 NOTE — PLAN OF CARE
Problem: Infection  Goal: Absence of Infection Signs and Symptoms  Outcome: Ongoing, Progressing     Problem: Adult Inpatient Plan of Care  Goal: Plan of Care Review  Outcome: Ongoing, Progressing  Goal: Patient-Specific Goal (Individualized)  Outcome: Ongoing, Progressing  Goal: Absence of Hospital-Acquired Illness or Injury  Outcome: Ongoing, Progressing

## 2022-12-14 NOTE — INTERVAL H&P NOTE
The patient has been examined and the H&P has been reviewed:    I concur with the findings and no changes have occurred since H&P was written.    Surgery risks, benefits and alternative options discussed and understood by patient/family.    NPO    No AC    Stated that she gives consent for mediport removal as well      To OR for planned procedure.    Hardeep Drummond MD  [employer] General Surgery PGY3

## 2022-12-14 NOTE — ANESTHESIA PROCEDURE NOTES
Intubation    Date/Time: 12/14/2022 7:18 AM  Performed by: Odalys Garza CRNA  Authorized by: Viky Su MD     Intubation:     Induction:  Intravenous    Intubated:  Postinduction    Mask Ventilation:  Easy with oral airway    Attempts:  1    Attempted By:  Student (ELFEGO SAHA)    Difficult Airway Encountered?: No      Complications:  None    Airway Device:  Supraglottic airway/LMA    Airway Device Size:  4.0 (I- GEL 4)    Style/Cuff Inflation:  Uncuffed    Endotracheal tube placement: taped to face.    Placement Verified By:  Capnometry    Complicating Factors:  None    Findings Post-Intubation:  BS equal bilateral and atraumatic/condition of teeth unchanged

## 2022-12-14 NOTE — DISCHARGE SUMMARY
[facility] - Periop Services  Discharge Note  Short Stay    Procedure(s) (LRB):  MASTECTOMY, SIMPLE (Left)  REMOVAL, VASCULAR ACCESS PORT (Left)      OUTCOME: Patient tolerated treatment/procedure well without complication and is now ready for discharge.    DISPOSITION: Home or Self Care    FINAL DIAGNOSIS:  S/p L mastectomy    FOLLOWUP: In clinic    DISCHARGE INSTRUCTIONS:    Discharge Procedure Orders   Diet Adult Regular     Lifting restrictions   Order Comments: No heavy lifting more than 10 pounds in each arm like a gallon of milk for 2-4 weeks.     Other restrictions (specify):     No driving until:   Order Comments: No driving while still requiring opioid pain medication for pain control     Notify your health care provider if you experience any of the following:  temperature >100.4     Notify your health care provider if you experience any of the following:  persistent nausea and vomiting or diarrhea     Notify your health care provider if you experience any of the following:  severe uncontrolled pain     Notify your health care provider if you experience any of the following:  redness, tenderness, or signs of infection (pain, swelling, redness, odor or green/yellow discharge around incision site)     Notify your health care provider if you experience any of the following:  difficulty breathing or increased cough     Notify your health care provider if you experience any of the following:  worsening rash     Nursing communication   Order Comments: Please perform drain teaching     Remove dressing in 48 hours   Order Comments: Use wrap as needed for support     Activity as tolerated   Order Comments: Light activity     Shower on day dressing removed (No bath)   Order Comments: Do not submerge incisional site in tub for 14 days. Able to Shower post op day 1 but just allow water to rinse

## 2022-12-14 NOTE — TRANSFER OF CARE
Anesthesia Transfer of Care Note    Patient: Cookie Ugalde    Procedure(s) Performed: Procedure(s) (LRB):  MASTECTOMY, SIMPLE (Left)  REMOVAL, VASCULAR ACCESS PORT (Left)    Patient location: PACU    Anesthesia Type: general    Transport from OR: Transported from OR on room air with adequate spontaneous ventilation    Post pain: adequate analgesia    Post assessment: no apparent anesthetic complications and tolerated procedure well    Post vital signs: stable    Level of consciousness: responds to stimulation and awake    Nausea/Vomiting: no nausea/vomiting    Complications: none    Transfer of care protocol was followed      Last vitals:   Visit Vitals  BP (!) 138/94 (BP Location: Left arm, Patient Position: Lying)   Pulse 104   Temp 36.2 °C (97.2 °F) (Temporal)   Resp 20   Wt 59.6 kg (131 lb 6.3 oz)   SpO2 100%   Breastfeeding No   BMI 24.03 kg/m²

## 2022-12-15 VITALS
WEIGHT: 131.38 LBS | BODY MASS INDEX: 24.03 KG/M2 | TEMPERATURE: 97 F | RESPIRATION RATE: 17 BRPM | SYSTOLIC BLOOD PRESSURE: 141 MMHG | OXYGEN SATURATION: 99 % | HEART RATE: 87 BPM | DIASTOLIC BLOOD PRESSURE: 87 MMHG

## 2022-12-15 DIAGNOSIS — C50.911 INFILTRATING DUCTAL CARCINOMA OF RIGHT BREAST: ICD-10-CM

## 2022-12-15 DIAGNOSIS — C50.919 TRIPLE NEGATIVE MALIGNANT NEOPLASM OF BREAST: ICD-10-CM

## 2022-12-15 LAB
ESTROGEN SERPL-MCNC: NORMAL PG/ML
INSULIN SERPL-ACNC: NORMAL U[IU]/ML
LAB AP CLINICAL INFORMATION: NORMAL
LAB AP GROSS DESCRIPTION: NORMAL
LAB AP REPORT FOOTNOTES: NORMAL
T3RU NFR SERPL: NORMAL %

## 2022-12-16 ENCOUNTER — DOCUMENTATION ONLY (OUTPATIENT)
Dept: HEMATOLOGY/ONCOLOGY | Facility: CLINIC | Age: 62
End: 2022-12-16
Payer: MEDICAID

## 2022-12-16 DIAGNOSIS — C50.911 INFILTRATING DUCTAL CARCINOMA OF RIGHT BREAST: ICD-10-CM

## 2022-12-16 DIAGNOSIS — C50.919 TRIPLE NEGATIVE MALIGNANT NEOPLASM OF BREAST: ICD-10-CM

## 2022-12-16 NOTE — PROGRESS NOTES
Faculty addendum: Patient discussed with resident. Chart was reviewed including vitals, labs, etc. Care provided reasonable and necessary. I participated in the management of the patient and was immediately available throughout the encounter. Services were furnished in a primary care center located in the outpatient department of a [facility] hospital. I agree with the resident's findings and plan as documented in the resident's note.

## 2022-12-16 NOTE — NURSING
Prescription for Lynparza sent to [facility] who reports that they are unable to fill prescription for this medication. Noted on patient's insurance card that the in network pharmacy is [facility]. New prescription to send to [facility]

## 2022-12-22 ENCOUNTER — OFFICE VISIT (OUTPATIENT)
Dept: SURGERY | Facility: CLINIC | Age: 62
End: 2022-12-22
Payer: MEDICAID

## 2022-12-22 VITALS
HEIGHT: 62 IN | RESPIRATION RATE: 18 BRPM | TEMPERATURE: 98 F | WEIGHT: 133.63 LBS | OXYGEN SATURATION: 99 % | DIASTOLIC BLOOD PRESSURE: 98 MMHG | SYSTOLIC BLOOD PRESSURE: 140 MMHG | HEART RATE: 91 BPM | BODY MASS INDEX: 24.59 KG/M2

## 2022-12-22 DIAGNOSIS — Z15.01 BRCA GENE MUTATION POSITIVE: Primary | ICD-10-CM

## 2022-12-22 DIAGNOSIS — Z15.09 BRCA GENE MUTATION POSITIVE: Primary | ICD-10-CM

## 2022-12-22 PROCEDURE — 99214 OFFICE O/P EST MOD 30 MIN: CPT | Mod: PBBFAC

## 2022-12-22 NOTE — PROGRESS NOTES
Pt seen by Dr. Moran; Pt instructed to return to clinic in 1 week to check drainage & possible removal of SALOME drain; Discharge paperwork given w/pt verbalizing understanding

## 2022-12-22 NOTE — PROGRESS NOTES
I have reviewed the notes, assessments, and/or procedures performed by the resident, I concur with her/his documentation of Cookie Ugalde.     Sofie Powers MD

## 2022-12-22 NOTE — PROGRESS NOTES
\[facility]\"" General Surgery Clinic Note    HPI: 62F hx of L breast cancer triple negative s/p R mastectomy subsequently found to be BRCA positive now s/p prophylactic L mastectomy on 12/14  Doing well  SALOME out 40cc /24hrs in past day  No fevers or pain  Doing well      PMH:   Past Medical History:   Diagnosis Date    Malignant neoplasm of right breast in female, estrogen receptor negative     infiltrating ductal, s/p right simple mastectomy, SNL biopsy, triple neg    Primary hypertension     Vitamin D deficiency       Meds:   Current Outpatient Medications:     hydroCHLOROthiazide (MICROZIDE) 12.5 mg capsule, Take 1 capsule (12.5 mg total) by mouth once daily., Disp: 90 capsule, Rfl: 0    LIDOcaine (LIDODERM) 5 %, Place 1 patch onto the skin once daily. Remove & Discard patch within 12 hours or as directed by MD, Disp: 30 patch, Rfl: 2    olaparib (LYNPARZA) tablet 150 mg, Take 2 tablets (300 mg total) by mouth 2 (two) times daily., Disp: 120 tablet, Rfl: 6    oxyCODONE-acetaminophen (PERCOCET)  mg per tablet, Take 1 tablet by mouth every 4 (four) hours as needed for Pain., Disp: 20 tablet, Rfl: 0    amLODIPine (NORVASC) 10 MG tablet, Take 1 tablet (10 mg total) by mouth once daily., Disp: 90 tablet, Rfl: 0  No current facility-administered medications for this visit.    Facility-Administered Medications Ordered in Other Visits:     albuterol-ipratropium 2.5 mg-0.5 mg/3 mL nebulizer solution 3 mL, 3 mL, Nebulization, Once PRN, Viky Su MD    HYDROmorphone injection 0.2 mg, 0.2 mg, Intravenous, Q5 Min PRN, Viky Su MD    HYDROmorphone injection 0.5 mg, 0.5 mg, Intravenous, Q5 Min PRN, Viky Su MD    lactated ringers infusion, , Intravenous, Continuous, Viky Su MD, Last Rate: 10 mL/hr at 12/14/22 0626, New Bag at 12/14/22 0626    LIDOcaine (PF) 10 mg/ml (1%) injection 10 mg, 1 mL, Intradermal, Once, ULICES De La Paz    ondansetron injection 4 mg, 4 mg, Intravenous, Once,  Viky Su MD    prochlorperazine injection Soln 5 mg, 5 mg, Intravenous, Once PRN, Viky Su MD  Allergies: Review of patient's allergies indicates:  No Known Allergies  Social History:   Social History     Tobacco Use    Smoking status: Former     Packs/day: 1.00     Years: 24.00     Pack years: 24.00     Types: Cigarettes     Quit date:      Years since quittin.9    Smokeless tobacco: Never   Substance Use Topics    Alcohol use: Not Currently     Comment: Pt. states she drinks 1/2 pint a day on weekends    Drug use: Yes     Types: Marijuana     Comment: on weekends     Family History:   Family History   Problem Relation Age of Onset    Stroke Mother     Hypertension Mother     Diabetes Mother     Hypertension Father     Prostate cancer Father         metastatic    Heart disease Father     Hypertension Sister     Diabetes Sister     Prostate cancer Brother 50    Prostate cancer Brother 57    Breast cancer Paternal Aunt     Breast cancer Paternal Aunt     Breast cancer Paternal Aunt     Breast cancer Paternal Aunt     Breast cancer Paternal Cousin      Surgical History:   Past Surgical History:   Procedure Laterality Date    HERNIA REPAIR      MEDIPORT INSERTION, SINGLE  2022    REMOVAL OF VASCULAR ACCESS PORT Left 2022    Procedure: REMOVAL, VASCULAR ACCESS PORT;  Surgeon: Sofie Powers MD;  Location: [facility];  Service: General;  Laterality: Left;    SIMPLE MASTECTOMY Left 2022    Procedure: MASTECTOMY, SIMPLE;  Surgeon: Sofie Powers MD;  Location: [facility];  Service: General;  Laterality: Left;    SIMPLE MASTECTOMY W/ SENTINEL NODE BIOPSY Right 2022    UMBILICAL HERNIA REPAIR N/A        Review of Systems:  Negative other than as stated in HPI    Objective:    Vitals:  Vitals:    22 1240   BP: (!) 140/98   Pulse: 91   Resp: 18   Temp: 98.1 °F (36.7 °C)          Physical Exam:  Gen: NAD  Neuro: awake, alert, answering questions appropriately  Resp:  non-labored breathing, EDWAR  Ext: moves all 4 spontaneously and purposefully  Skin: warm, well perfused  Surgical site: incision intact with steristrips in place. SALOME with serous output        Assessment/Plan:  S/p L prophylactic mastectomy 12/14  -path reviewed, no malignancy   -rtc next week for drain removal if <20cc in 24hrs          Satish Moran  [employer] General Surgery, 5  12/22/2022 1:15 PM        Statement Selected

## 2022-12-29 ENCOUNTER — OFFICE VISIT (OUTPATIENT)
Dept: SURGERY | Facility: CLINIC | Age: 62
End: 2022-12-29
Payer: MEDICAID

## 2022-12-29 VITALS
DIASTOLIC BLOOD PRESSURE: 94 MMHG | TEMPERATURE: 98 F | HEIGHT: 62 IN | WEIGHT: 130 LBS | OXYGEN SATURATION: 99 % | SYSTOLIC BLOOD PRESSURE: 138 MMHG | RESPIRATION RATE: 18 BRPM | HEART RATE: 92 BPM | BODY MASS INDEX: 23.92 KG/M2

## 2022-12-29 DIAGNOSIS — Z15.02 BRCA GENE MUTATION POSITIVE IN FEMALE: Primary | ICD-10-CM

## 2022-12-29 DIAGNOSIS — Z15.09 BRCA GENE MUTATION POSITIVE IN FEMALE: Primary | ICD-10-CM

## 2022-12-29 DIAGNOSIS — Z15.01 BRCA GENE MUTATION POSITIVE IN FEMALE: Primary | ICD-10-CM

## 2022-12-29 PROCEDURE — 99214 OFFICE O/P EST MOD 30 MIN: CPT | Mod: PBBFAC

## 2022-12-29 NOTE — PROGRESS NOTES
I have reviewed the note and assessment with the Resident .  Drain was removed.   I agree with the documentation and plan of care.

## 2022-12-29 NOTE — PROGRESS NOTES
[facility] General Surgery Clinic Note    HPI: Cookie Ugalde is a 62 y.o. 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 Pt states that she's doing well. She does have pain at surgical site but improved with pain medication. Drain output has been minimal. PT states that she denies F/C, SOB, CP, or abdominal pain.    PMH:   Past Medical History:   Diagnosis Date    Malignant neoplasm of right breast in female, estrogen receptor negative     infiltrating ductal, s/p right simple mastectomy, SNL biopsy, triple neg    Primary hypertension     Vitamin D deficiency       Meds:   Current Outpatient Medications:     hydroCHLOROthiazide (MICROZIDE) 12.5 mg capsule, Take 1 capsule (12.5 mg total) by mouth once daily., Disp: 90 capsule, Rfl: 0    LIDOcaine (LIDODERM) 5 %, Place 1 patch onto the skin once daily. Remove & Discard patch within 12 hours or as directed by MD, Disp: 30 patch, Rfl: 2    olaparib (LYNPARZA) tablet 150 mg, Take 2 tablets (300 mg total) by mouth 2 (two) times daily., Disp: 120 tablet, Rfl: 6    oxyCODONE-acetaminophen (PERCOCET)  mg per tablet, Take 1 tablet by mouth every 4 (four) hours as needed for Pain., Disp: 20 tablet, Rfl: 0    amLODIPine (NORVASC) 10 MG tablet, Take 1 tablet (10 mg total) by mouth once daily., Disp: 90 tablet, Rfl: 0  No current facility-administered medications for this visit.    Facility-Administered Medications Ordered in Other Visits:     albuterol-ipratropium 2.5 mg-0.5 mg/3 mL nebulizer solution 3 mL, 3 mL, Nebulization, Once PRN, Viky Su MD    HYDROmorphone injection 0.2 mg, 0.2 mg, Intravenous, Q5 Min PRN, Viky Su MD    HYDROmorphone injection 0.5 mg, 0.5 mg, Intravenous, Q5 Min PRN, Viky Su MD    lactated ringers infusion, , Intravenous, Continuous, Viky Su MD, Last Rate: 10 mL/hr at 12/14/22 0626, New Bag at 12/14/22 0626    LIDOcaine (PF) 10 mg/ml (1%)  injection 10 mg, 1 mL, Intradermal, Once, ULICES De La Paz    ondansetron injection 4 mg, 4 mg, Intravenous, Once, Viky Su MD    prochlorperazine injection Soln 5 mg, 5 mg, Intravenous, Once PRN, Viky Su MD  Allergies: Review of patient's allergies indicates:  No Known Allergies  Social History:   Social History     Tobacco Use    Smoking status: Former     Packs/day: 1.00     Years: 24.00     Pack years: 24.00     Types: Cigarettes     Quit date:      Years since quittin.9    Smokeless tobacco: Never   Substance Use Topics    Alcohol use: Not Currently     Comment: Pt. states she drinks 1/2 pint a day on weekends    Drug use: Yes     Types: Marijuana     Comment: on weekends     Family History:   Family History   Problem Relation Age of Onset    Stroke Mother     Hypertension Mother     Diabetes Mother     Hypertension Father     Prostate cancer Father         metastatic    Heart disease Father     Hypertension Sister     Diabetes Sister     Prostate cancer Brother 50    Prostate cancer Brother 57    Breast cancer Paternal Aunt     Breast cancer Paternal Aunt     Breast cancer Paternal Aunt     Breast cancer Paternal Aunt     Breast cancer Paternal Cousin      Surgical History:   Past Surgical History:   Procedure Laterality Date    HERNIA REPAIR      MEDIPORT INSERTION, SINGLE  2022    REMOVAL OF VASCULAR ACCESS PORT Left 2022    Procedure: REMOVAL, VASCULAR ACCESS PORT;  Surgeon: Sofie Powers MD;  Location: [facility];  Service: General;  Laterality: Left;    SIMPLE MASTECTOMY Left 2022    Procedure: MASTECTOMY, SIMPLE;  Surgeon: Sofie Powers MD;  Location: [facility];  Service: General;  Laterality: Left;    SIMPLE MASTECTOMY W/ SENTINEL NODE BIOPSY Right 2022    UMBILICAL HERNIA REPAIR N/A      Review of Systems:  Skin: No rashes or itching.  Head: Denies headache or recent trauma.  Eyes: Denies eye pain or double vision.  Neck: Denies swelling  or hoarseness of voice.  Respiratory: Denies shortness of breath or chest pain  Cardiac: Denies palpitations or swelling in hands/feet.  Gastrointestinal: Denies nausea, denies vomiting.   Urinary: Denies dysuria or hematuria.  Vascular: Denies claudication or leg swelling.  Neuro: Denies motor deficits. Denies weakness.  Endocrine: Denies excessive sweating or cold intolerance.  Psych: Denies memory problems. Denies anxiety.    Objective:    Vitals:  Vitals:    12/29/22 1342   BP: (!) 138/94   Pulse: 92   Resp: 18   Temp: 98.1 °F (36.7 °C)      Physical Exam:  Gen: NAD  Neuro: awake, alert, answering questions appropriately  CV: RRR  Resp: non-labored breathing, EDWAR  Abd: soft, ND, NT  : deferred  Ext: moves all 4 spontaneously and purposefully  Skin: L Breast Mastectomy site c/d/I, drain intact      Micro/Path/Other:  1. Left breast, prophylactic simple mastectomy:   - Minimal fibrocystic change with dilated ducts, cystic apocrine metaplasia, usual ductal hyperplasia, and fibrosis.  - No evidence of malignancy or atypia.      Assessment/Plan:  63 yo F s/p L mastectomy.    - RTC in 6 months  -She is f/uing up with Heme/Onc next week.    Hardeep Drummond MD MD PGY3  12/29/2022 2:02 PM

## 2022-12-30 ENCOUNTER — DOCUMENTATION ONLY (OUTPATIENT)
Dept: HEMATOLOGY/ONCOLOGY | Facility: CLINIC | Age: 62
End: 2022-12-30
Payer: MEDICAID

## 2022-12-30 NOTE — NURSING
RAUL Ahn follow up phone contact. Patient confirms she received prescription of Lynparza through specialty pharmacy. Denies any concerns. Confirmed with patient that the mastectomy bras order was [facility] to [facility]. Patient states she called [facility] and will have to schedule next week. Patient agrees to contact RAUL Ahn if has any further needs.

## 2023-01-04 ENCOUNTER — OFFICE VISIT (OUTPATIENT)
Dept: HEMATOLOGY/ONCOLOGY | Facility: CLINIC | Age: 63
End: 2023-01-04
Payer: MEDICAID

## 2023-01-04 VITALS
OXYGEN SATURATION: 96 % | HEIGHT: 62 IN | WEIGHT: 136 LBS | TEMPERATURE: 98 F | BODY MASS INDEX: 25.03 KG/M2 | SYSTOLIC BLOOD PRESSURE: 136 MMHG | DIASTOLIC BLOOD PRESSURE: 84 MMHG | HEART RATE: 84 BPM | RESPIRATION RATE: 20 BRPM

## 2023-01-04 DIAGNOSIS — C50.919 TRIPLE NEGATIVE MALIGNANT NEOPLASM OF BREAST: Primary | ICD-10-CM

## 2023-01-04 PROCEDURE — 3008F BODY MASS INDEX DOCD: CPT | Mod: CPTII,,, | Performed by: NURSE PRACTITIONER

## 2023-01-04 PROCEDURE — 3008F PR BODY MASS INDEX (BMI) DOCUMENTED: ICD-10-PCS | Mod: CPTII,,, | Performed by: NURSE PRACTITIONER

## 2023-01-04 PROCEDURE — 99214 PR OFFICE/OUTPT VISIT, EST, LEVL IV, 30-39 MIN: ICD-10-PCS | Mod: S$PBB,,, | Performed by: NURSE PRACTITIONER

## 2023-01-04 PROCEDURE — 3075F PR MOST RECENT SYSTOLIC BLOOD PRESS GE 130-139MM HG: ICD-10-PCS | Mod: CPTII,,, | Performed by: NURSE PRACTITIONER

## 2023-01-04 PROCEDURE — 1159F PR MEDICATION LIST DOCUMENTED IN MEDICAL RECORD: ICD-10-PCS | Mod: CPTII,,, | Performed by: NURSE PRACTITIONER

## 2023-01-04 PROCEDURE — 1159F MED LIST DOCD IN RCRD: CPT | Mod: CPTII,,, | Performed by: NURSE PRACTITIONER

## 2023-01-04 PROCEDURE — 3079F PR MOST RECENT DIASTOLIC BLOOD PRESSURE 80-89 MM HG: ICD-10-PCS | Mod: CPTII,,, | Performed by: NURSE PRACTITIONER

## 2023-01-04 PROCEDURE — 1160F RVW MEDS BY RX/DR IN RCRD: CPT | Mod: CPTII,,, | Performed by: NURSE PRACTITIONER

## 2023-01-04 PROCEDURE — 1160F PR REVIEW ALL MEDS BY PRESCRIBER/CLIN PHARMACIST DOCUMENTED: ICD-10-PCS | Mod: CPTII,,, | Performed by: NURSE PRACTITIONER

## 2023-01-04 PROCEDURE — 3075F SYST BP GE 130 - 139MM HG: CPT | Mod: CPTII,,, | Performed by: NURSE PRACTITIONER

## 2023-01-04 PROCEDURE — 3079F DIAST BP 80-89 MM HG: CPT | Mod: CPTII,,, | Performed by: NURSE PRACTITIONER

## 2023-01-04 PROCEDURE — 99213 OFFICE O/P EST LOW 20 MIN: CPT | Mod: PBBFAC | Performed by: NURSE PRACTITIONER

## 2023-01-04 PROCEDURE — 99214 OFFICE O/P EST MOD 30 MIN: CPT | Mod: S$PBB,,, | Performed by: NURSE PRACTITIONER

## 2023-01-04 NOTE — PROGRESS NOTES
Problem List:   IDC Right Breast, Triple Negative; follow up     Current Treatment:  Olaparib 300 mg p.o. twice daily for 1 year or until disease progression or unacceptable toxicity, whichever occurs 1st  Start date pending 2022    Zometa IV Q 6 months   Started 2022    Treatment History:  -Mediport placed on 2022  -AC x4 cycles (2022-2022)  -DD Taxol Q2 weeks x4 cycles (2022-2022)   2022-S/p Left mastectomy and mediport removed     Past medical history: Tobacco abuse. Hypertension. Arthritis of right shoulder.  Procedure/surgical history:  Umbilical hernia repair .  Social history: . Lives in [place] [id number]. Has 4 children. Works as a  in a [employer]. Has been smoking 5 cigarettes daily for 15 years. Drinks half a pint of liquor over the weekend. No illicit drugs  Menstrual and OB/GYN history: Menarche, age 14. Menopause, late 40s. First child at age 26 years. No abortions or miscarriages. Used birth control pills for 8 years. Did not use hormone replacement therapy after menopause. Breast-fed 1 child for 7 months.    Family history:  2 brothers experienced prostate cancer in their 50s  Father  from prostate cancer in his 60s-70s  No family history of breast cancer.    History of Present Illness:  61 -year-old female referred from surgery clinic, with newly diagnosed right breast cancer.  She never palpated breast mass. She noticed breast/chest wall pain after extensive muscle use in 2021. At that time, she was diagnosed with musculoskeletal pain. In January, she went back to emergency department when symptoms did not resolve. At that time, physical examination apparently noted irregularity in breast and she was referred for further imaging and biopsy revealing for triple negative breast cancer.     Interval history 2022: Patient presents today alone for scheduled follow up for breast cancer. She is currently taking  Mariely of which she reports adherence. Patient said she started 2 days post mastectomy and tolerating well. She also says she is recovering well. Lab work reviewed with patient, stable. She request refills on Percocet for pain. Patient says that she is also going to have ovaries removed. Discussed follow up appointments with patient.     Review of Systems: Negative as otherwise stated in HPI    Lab Results   Component Value Date    WBC 9.9 01/04/2023    RBC 4.26 01/04/2023    HGB 12.6 01/04/2023    HCT 37.3 01/04/2023    MCV 87.6 01/04/2023    MCH 29.6 01/04/2023    MCHC 33.8 01/04/2023    RDW 14.1 01/04/2023     01/04/2023    MPV 10.7 01/04/2023     CMP  Sodium Level   Date Value Ref Range Status   01/04/2023 138 136 - 145 mmol/L Final     Potassium Level   Date Value Ref Range Status   01/04/2023 3.6 3.5 - 5.1 mmol/L Final     Carbon Dioxide   Date Value Ref Range Status   01/04/2023 24 23 - 31 mmol/L Final     Blood Urea Nitrogen   Date Value Ref Range Status   01/04/2023 12.0 9.8 - 20.1 mg/dL Final     Creatinine   Date Value Ref Range Status   01/04/2023 0.75 0.55 - 1.02 mg/dL Final     Calcium Level Total   Date Value Ref Range Status   01/04/2023 10.1 8.4 - 10.2 mg/dL Final     Albumin Level   Date Value Ref Range Status   01/04/2023 4.0 3.4 - 4.8 g/dL Final     Bilirubin Total   Date Value Ref Range Status   01/04/2023 0.8 <=1.5 mg/dL Final     Alkaline Phosphatase   Date Value Ref Range Status   01/04/2023 78 40 - 150 unit/L Final     Aspartate Aminotransferase   Date Value Ref Range Status   01/04/2023 16 5 - 34 unit/L Final     Alanine Aminotransferase   Date Value Ref Range Status   01/04/2023 8 0 - 55 unit/L Final     eGFR   Date Value Ref Range Status   01/04/2023 >90 mls/min/1.73/m2 Final     Physical Exam:  Vitals & Measurements  Vitals:    01/04/23 1127   BP: 136/84   Pulse: 84   Resp: 20   Temp: 98.2 °F (36.8 °C)     Assessment:  #IDC right breast, triple negative:  To summarize:  IDC  right breast, triple negative  Right simple mastectomy 04/11/2022  2.2 cm, grade 3, no LVI, margins negative, 4 lymph nodes negative  T2 pN0 MX, G3, triple negative, AJCC anatomic stage IIA, pathological prognostic stage IIA  Postmenopausal per labs (04/28/2022)  S/P adjuvant ddAC x4, then dose dense Taxol x4 (05/30/2022-09/12/2022)   -genetic testing:  positive for deleterious mutation: BRCA2 p.* ([id number]).   -11/01/2022:  Started Zometa 4 mg IV (every 6 months x3-5 years) (for risk reduction of distant metastasis)  -11/18/2022:  Bilateral screening mammography with tomosynthesis (comparison:  03/18/2022 mammogram): Both breasts benign, BI-RADS 2 (continue annual screening mammography with digital breast tomosynthesis)    Plan:  -postmenopausal (per labs 04/28/2022)     S/P adjuvant ddAC x4, then adjuvant dose dense Taxol x4 (05/30/2022-09/12/2022)     pT2 pN0; S/P Bilateral mastectomy   Does not need adjuvant radiotherapy     Genetic testing + for deleterious BRCA2 mutation  Will benefit from 1 year of adjuvant olaparib (triple negative pT2 disease)  Olaparib 300 mg p.o. twice daily for 1 year or until disease progression or unacceptable toxicity, whichever occurs 1st  May be taken with or without food  Monitor for myelodysplastic syndrome/acute myeloid leukemia; pneumonitis; nausea, fatigue, cytopenias, etc.      -Consider adjuvant bisphosphonate therapy for risk reduction of distant metastasis for 3-5 years in postmenopausal patients (natural or induced) with high risk node-negative or node positive tumors  -postmenopausal per labs 04/28/2022  -10/05/2022: Dental clearance obtained  -adjuvant Zometa started 11/01/2022  >>>  Continue Zometa 4 mg IV every 6 months x3-5 years (until 11/2027)-Scheduled for 5/1/2023     Left mastectomy surgery date pending   Will benefit from risk reduction BSO; surgery date pending  Family counseling deferred to      Continue breast cancer surveillance  -History and  physical exam 1-4 times per year for 5 years (04/2022-04/2027), then annually;  -Mammogram every 12 months, with no clear advantage to shorter interval imaging;  -Educate, monitor, and referred for lymphedema management  -In the absence of clinical signs or symptoms suggestive of recurrent disease, there is no indication for laboratory or imaging studies for metastasis screening;  -Active lifestyle, healthy diet, limited alcohol intake, and achieving and maintaining an ideal body weight (20-25 BMI) may lead to optimal breast cancer outcomes.  >>>  -S/P Bilateral mastectomy Therefore, no role of surveillance mammography      S/p Left mastectomy and mediport removed on 12/14/2022    Follow up with NP in 4 weeks with lab work   Continue Olaparib-no refills needed   Mediport dc'd during left mastectomy     Above discussed at length with the patient.  All questions answered.  Discussed the rationale and potential side effects of olaparib, and gave her educational materials from [facility].  -----------------  Discussion:  Olaparib:   Dose: 300 mg p.o. twice daily with or without food.    For moderate renal impairment (creatinine clearance 31-50 mL/min), reduced dose to 200 mg twice daily.   For adverse reactions, consider dose interruption or dose reduction for recommendations.      Warnings and precautions:   1.  Myelodysplastic syndrome/acute myeloid leukemia in less than 1.5% of patients;   2.  Pneumonitis occurs in less than 1% of patients;   3.  Embryo fetal toxicity      Adverse reactions:   Most common adverse reactions (greater than or equal 20%) were anemia, nausea, fatigue, vomiting, neutropenia, leukopenia, nasopharyngitis/URI/influenza, respiratory tract infection, diarrhea, arthralgia/myalgia/dysgeusia, headache, dyspepsia, decreased appetite, constipation and stomatitis.   Most common laboratory abnormalities (in 25% or greater) were anemia, increase in MCV, decrease in lymphocytes, decrease in leukocytes,  decrease in absolute neutrophil count, increasing serum creatinine, and decrease in platelets.

## 2023-01-17 ENCOUNTER — OFFICE VISIT (OUTPATIENT)
Dept: FAMILY MEDICINE | Facility: CLINIC | Age: 63
End: 2023-01-17
Payer: MEDICAID

## 2023-01-17 ENCOUNTER — TELEPHONE (OUTPATIENT)
Dept: HEMATOLOGY/ONCOLOGY | Facility: CLINIC | Age: 63
End: 2023-01-17
Payer: MEDICAID

## 2023-01-17 VITALS
OXYGEN SATURATION: 99 % | BODY MASS INDEX: 24.99 KG/M2 | HEIGHT: 62 IN | TEMPERATURE: 98 F | SYSTOLIC BLOOD PRESSURE: 134 MMHG | DIASTOLIC BLOOD PRESSURE: 84 MMHG | WEIGHT: 135.81 LBS | RESPIRATION RATE: 17 BRPM | HEART RATE: 96 BPM

## 2023-01-17 DIAGNOSIS — M79.10 MYALGIA: Primary | ICD-10-CM

## 2023-01-17 DIAGNOSIS — I10 PRIMARY HYPERTENSION: ICD-10-CM

## 2023-01-17 DIAGNOSIS — K59.00 CONSTIPATION, UNSPECIFIED CONSTIPATION TYPE: ICD-10-CM

## 2023-01-17 PROCEDURE — 99215 OFFICE O/P EST HI 40 MIN: CPT | Mod: PBBFAC | Performed by: NURSE PRACTITIONER

## 2023-01-17 RX ORDER — AMLODIPINE BESYLATE 10 MG/1
10 TABLET ORAL DAILY
Qty: 90 TABLET | Refills: 0 | Status: SHIPPED | OUTPATIENT
Start: 2023-01-17 | End: 2023-04-17 | Stop reason: SDUPTHER

## 2023-01-17 RX ORDER — LIDOCAINE 50 MG/G
1 PATCH TOPICAL DAILY
Qty: 30 PATCH | Refills: 2 | Status: SHIPPED | OUTPATIENT
Start: 2023-01-17 | End: 2023-04-17 | Stop reason: SDUPTHER

## 2023-01-17 RX ORDER — AMLODIPINE BESYLATE 10 MG/1
10 TABLET ORAL DAILY
Qty: 90 TABLET | Refills: 0 | Status: SHIPPED | OUTPATIENT
Start: 2023-01-17 | End: 2023-01-17 | Stop reason: SDUPTHER

## 2023-01-17 RX ORDER — HYDROCHLOROTHIAZIDE 12.5 MG/1
12.5 CAPSULE ORAL DAILY
Qty: 90 CAPSULE | Refills: 0 | Status: SHIPPED | OUTPATIENT
Start: 2023-01-17 | End: 2023-04-17 | Stop reason: SDUPTHER

## 2023-01-17 RX ORDER — POLYETHYLENE GLYCOL 3350 17 G/17G
17 POWDER, FOR SOLUTION ORAL DAILY
Qty: 31 EACH | Refills: 0 | Status: SHIPPED | OUTPATIENT
Start: 2023-01-17 | End: 2023-02-17 | Stop reason: SDUPTHER

## 2023-01-17 RX ORDER — HYDROCHLOROTHIAZIDE 12.5 MG/1
12.5 CAPSULE ORAL DAILY
Qty: 90 CAPSULE | Refills: 0 | Status: SHIPPED | OUTPATIENT
Start: 2023-01-17 | End: 2023-01-17 | Stop reason: SDUPTHER

## 2023-01-17 NOTE — PROGRESS NOTES
Family Medicine Clinic Note:    PCP: Georgina Lang MD    Cookie Ugalde is a 62 y.o. female with a history of IDC presents to clinic today s/p left side mastectomy       Subjective:   s/p elective left side mastectomy   -States that she has  been getting her pain medication from Hem/Onc    Constipation  -Having a BM two times a week  -No blood in stool noted  -On opioids    Pain between shoulders 2/2 reported OA  -states that the lidocaine patch helps   -intermittent pain on the scapulas bilateral that she reports she has had since a child and due to OA  -No imaging  -Requesting refills  -Denies any side effects    HTN  -Bp today 134/84   -Requesting refills on amlodipine and HCTZ  -Denies chest pain, SOB or palpitations      ROS  Constitutional: No fevers, chills or fatigue  Respiratory: no trouble breathing or SOB  Cardiovascular: no chest pain or tightness, no SOB   Gastrointestinal:  no diarrhea, no nausea, no vomiting, see HPI  Musculoskeletal: see HPI    HM: Needs shingles vaccine --> Will hold for now due to recent surgery  Colonoscopy to Dr. Ye pending (has medicaid/[facility])  Current Outpatient Medications   Medication Sig Dispense Refill    amLODIPine (NORVASC) 10 MG tablet Take 1 tablet (10 mg total) by mouth once daily. 90 tablet 0    hydroCHLOROthiazide (MICROZIDE) 12.5 mg capsule Take 1 capsule (12.5 mg total) by mouth once daily. 90 capsule 0    oxyCODONE-acetaminophen (PERCOCET)  mg per tablet Take 1 tablet by mouth every 4 (four) hours as needed for Pain. 20 tablet 0     No current facility-administered medications for this visit.     Facility-Administered Medications Ordered in Other Visits   Medication Dose Route Frequency Provider Last Rate Last Admin    albuterol-ipratropium 2.5 mg-0.5 mg/3 mL nebulizer solution 3 mL  3 mL Nebulization Once PRN Viky Su MD        HYDROmorphone injection 0.2 mg  0.2 mg Intravenous Q5 Min PRN Viky Su MD         "HYDROmorphone injection 0.5 mg  0.5 mg Intravenous Q5 Min PRN Viky Su MD        lactated ringers infusion   Intravenous Continuous Viky Su MD 10 mL/hr at 12/14/22 0626 New Bag at 12/14/22 0626    LIDOcaine (PF) 10 mg/ml (1%) injection 10 mg  1 mL Intradermal Once Edelmira Shelton, FNLOIS        ondansetron injection 4 mg  4 mg Intravenous Once Viky Su MD        prochlorperazine injection Soln 5 mg  5 mg Intravenous Once PRN Viky Su MD           Objective:     /84 (BP Location: Right arm, Patient Position: Sitting, BP Method: Medium (Automatic)) Comment (BP Location): BP assessed on right wrist  Pulse 96   Temp 98.2 °F (36.8 °C) (Oral)   Resp 17   Ht 5' 1.81" (1.57 m)   Wt 61.6 kg (135 lb 12.8 oz)   SpO2 99%   BMI 24.99 kg/m²   BP Readings from Last 3 Encounters:   01/17/23 134/84   01/04/23 136/84   12/29/22 (!) 138/94     Wt Readings from Last 3 Encounters:   01/17/23 61.6 kg (135 lb 12.8 oz)   01/04/23 61.7 kg (136 lb)   12/29/22 59 kg (130 lb)     No results found for this or any previous visit (from the past 200 hour(s)).  Body mass index is 24.99 kg/m².    Physical Exam  Constitutional: NAD  Lungs: CTAB, No crackles, No wheezes  Cardiovascular: Normal heart sounds, No MRG  Abdomen: Soft, Nontender, No palpable hepatosplenomegaly, No abdominal masses  Skin: left mastectomy incision well approximated without erythema or drainage or tenderness to site  MSK: AROM of both shoulders. Non tender between the shoulders or the scapulas. No limitations to ROM.        The 10-year ASCVD risk score (Kourtney MANZANARES, et al., 2019) is: 10.6%    Values used to calculate the score:      Age: 62 years      Sex: Female      Is Non- : Yes      Diabetic: No      Tobacco smoker: No      Systolic Blood Pressure: 134 mmHg      Is BP treated: Yes      HDL Cholesterol: 45 mg/dL      Total Cholesterol: 250 mg/dL      Assessment:   Cookie Ugalde is a 62 y.o. " female with:  s/p elective left side mastectomy   Constipation  Pain between shoulders 2/2 OA  HTN    Plan:   s/p elective left side mastectomy   -Incision healing well  -Keep appointment with Heme/Onc    Constipation  -On opioids and suspect the etiology of the constipation  -Ordered Miralax daily while on opioids    Muscle pain to shoulders 2/2 OA  -Due to history of breast cancer will obtain x-ray thoracic spine to assure no mets to bone  -Refilled Lidoderm patch    HTN  -Refilled amlodipine and HCTZ  -Continue current regimen              Future Appointments   Date Time Provider Department Center   1/24/2023  2:00 PM INFUSION ROOM 531, [facility] CHEMOTHERAPY INFUSION [facility] CHEMO [place] [facility]   2/1/2023  1:30 PM NURSE, [facility]   2/1/2023  2:30 PM ULICES Pena [facility]    2/13/2023  1:30 PM INFUSION ROOM 530, [facility] CHEMOTHERAPY INFUSION [facility] CHEMO [place] [facility]   5/1/2023  1:00 PM INFUSION ROOM 534, [facility] CHEMOTHERAPY INFUSION [facility] CHEMO [place] [facility]   6/29/2023  1:20 PM PROVIDER, [facility] BREAST SURGERY [facility] TONY [place] [facility]       Staff: Dr. Anderson Pugh MD  Family Medicine PGY-2

## 2023-01-18 NOTE — TELEPHONE ENCOUNTER
On 1/12/2023, I saw Ms. Ugalde's daughter for genetic counseling and predictive testing given Ms. Ugalde's reported BRCA mutation. During the visit, we called Ms. Ugalde and she gave me permission to access her chart to obtain her genetic testing information and to share those results with her daughter.

## 2023-01-25 ENCOUNTER — TELEPHONE (OUTPATIENT)
Dept: GYNECOLOGY | Facility: CLINIC | Age: 63
End: 2023-01-25
Payer: MEDICAID

## 2023-01-26 ENCOUNTER — TELEPHONE (OUTPATIENT)
Dept: GYNECOLOGY | Facility: CLINIC | Age: 63
End: 2023-01-26
Payer: MEDICAID

## 2023-01-26 NOTE — TELEPHONE ENCOUNTER
Called patient to discuss surgery date. She is to be scheduled for TLH/risk reducing BSO/cystoscopy on 2/7/23 in the setting of BRCA2+ status. She will come for her preoperative visit on 1/30/23. Patient states understanding and agrees to plan.    Marissa Dale MD  [facility] PGY3

## 2023-01-27 DIAGNOSIS — N93.9 ABNORMAL UTERINE BLEEDING (AUB): Primary | ICD-10-CM

## 2023-01-30 ENCOUNTER — DOCUMENTATION ONLY (OUTPATIENT)
Dept: GYNECOLOGY | Facility: CLINIC | Age: 63
End: 2023-01-30

## 2023-01-30 ENCOUNTER — OFFICE VISIT (OUTPATIENT)
Dept: GYNECOLOGY | Facility: CLINIC | Age: 63
End: 2023-01-30
Payer: MEDICAID

## 2023-01-30 VITALS
OXYGEN SATURATION: 97 % | HEIGHT: 61 IN | DIASTOLIC BLOOD PRESSURE: 88 MMHG | BODY MASS INDEX: 26.09 KG/M2 | WEIGHT: 138.19 LBS | TEMPERATURE: 98 F | HEART RATE: 99 BPM | RESPIRATION RATE: 18 BRPM | SYSTOLIC BLOOD PRESSURE: 133 MMHG

## 2023-01-30 DIAGNOSIS — Z15.01 BRCA2 GENE MUTATION POSITIVE IN FEMALE: ICD-10-CM

## 2023-01-30 DIAGNOSIS — Z15.09 BRCA2 GENE MUTATION POSITIVE: ICD-10-CM

## 2023-01-30 DIAGNOSIS — Z15.01 BRCA GENE POSITIVE: ICD-10-CM

## 2023-01-30 DIAGNOSIS — Z85.3 HISTORY OF BREAST CANCER: Primary | ICD-10-CM

## 2023-01-30 DIAGNOSIS — Z15.02 BRCA2 GENE MUTATION POSITIVE IN FEMALE: ICD-10-CM

## 2023-01-30 DIAGNOSIS — Z15.09 BRCA2 GENE MUTATION POSITIVE IN FEMALE: ICD-10-CM

## 2023-01-30 DIAGNOSIS — Z15.09 BRCA GENE POSITIVE: ICD-10-CM

## 2023-01-30 DIAGNOSIS — Z15.01 BRCA2 GENE MUTATION POSITIVE: ICD-10-CM

## 2023-01-30 DIAGNOSIS — Z01.818 PREOPERATIVE EXAM FOR GYNECOLOGIC SURGERY: ICD-10-CM

## 2023-01-30 PROCEDURE — 99214 OFFICE O/P EST MOD 30 MIN: CPT | Mod: PBBFAC

## 2023-01-30 NOTE — PROGRESS NOTES
"[facility] Preoperative Visit History and Physical    HPI:   62 y.o.  with a history of triple negative breast cancer, found to be BRCA2+. She presents today to discuss risk-reducing BSO as well as hysterectomy per patient preference.     PMH:  Right IDC of breast, triple negative, Stage IIA. S/p adjuvant chemo and bilateral mastectomy. Does not plan for reconstruction.  HTN  OA - states since childhood, in her back.   Constipation - now has daily BM with use of Miralax    ObHx:    Term  x 4    GynHx:  Menarche, age 14. Menopause, early 50s. States she has been having hot flashes "all her life." Denies trouble sleeping or vaginal dryness.  First child at age 26 years. No abortions or miscarriages. Used birth control pills for 8 years. Did not use hormone replacement therapy after menopause. Breast-fed 1 child for 7 months.   Denies STIs/abnormal paps     SurgHx:  Right mastectomy and lymph node dissection 2022  Left mastectomy 2022  BTL ()  Umbilical hernia repair (, age 6)    FamHx:  2 brothers experienced prostate cancer in their 50s  Father  from prostate cancer in his 60s-70s  Multiple aunts with breast cancer on her father's side of family  Denies history of breast, ovarian, endometrial, colon cancers.    SocialHx:  Former tobacco use: Approximately 20 pack years, started in late 30s and quit when she started her chemo  Denies alcohol/illicit drug use.    All:  NKDA    Meds:  Amlodipine  HCTZ  Olaparib    OB History    Para Term  AB Living   4 4           SAB IAB Ectopic Multiple Live Births                  # Outcome Date GA Lbr [place]/2nd Weight Sex Delivery Anes PTL Lv   4 Para            3 Para            2 Para            1 Para              Past Medical History:   Diagnosis Date    Malignant neoplasm of right breast in female, estrogen receptor negative     infiltrating ductal, s/p right simple mastectomy, SNL biopsy, triple neg    Primary hypertension     " Vitamin D deficiency      Past Surgical History:   Procedure Laterality Date    HERNIA REPAIR      MEDIPORT INSERTION, SINGLE  2022    REMOVAL OF VASCULAR ACCESS PORT Left 2022    Procedure: REMOVAL, VASCULAR ACCESS PORT;  Surgeon: Sofie Powers MD;  Location: [facility] OR;  Service: General;  Laterality: Left;    SIMPLE MASTECTOMY Left 2022    Procedure: MASTECTOMY, SIMPLE;  Surgeon: Sofie Powers MD;  Location: [facility] OR;  Service: General;  Laterality: Left;    SIMPLE MASTECTOMY W/ SENTINEL NODE BIOPSY Right 2022    UMBILICAL HERNIA REPAIR N/A      Prior to Admission medications    Medication Sig Start Date End Date Taking? Authorizing Provider   amLODIPine (NORVASC) 10 MG tablet Take 1 tablet (10 mg total) by mouth once daily. 23  Georgina Lang MD   hydroCHLOROthiazide (MICROZIDE) 12.5 mg capsule Take 1 capsule (12.5 mg total) by mouth once daily. 23   Georgina Lang MD   LIDOcaine (LIDODERM) 5 % Place 1 patch onto the skin once daily. Remove & Discard patch within 12 hours or as directed by MD 23   Georgina Lang MD   olaparib (LYNPARZA) tablet 150 mg Take 2 tablets (300 mg total) by mouth 2 (two) times daily. 23   ULICES Pena   oxyCODONE-acetaminophen (PERCOCET)  mg per tablet Take 1 tablet by mouth every 4 (four) hours as needed for Pain. 22   Hardeep Drummond MD   polyethylene glycol (GLYCOLAX) 17 gram PwPk Take 17 g by mouth once daily. 23   Georgina Lang MD     Review of patient's allergies indicates:  No Known Allergies  Social History     Socioeconomic History    Marital status:    Tobacco Use    Smoking status: Former     Packs/day: 1.00     Years: 24.00     Pack years: 24.00     Types: Cigarettes     Quit date:      Years since quittin.0    Smokeless tobacco: Never   Substance and Sexual Activity    Alcohol use: Not Currently     Comment: Pt. states she drinks  "1/2 pint a day on weekends    Drug use: Yes     Types: Marijuana     Comment: on weekends    Sexual activity: Yes     Partners: Male     Family History   Problem Relation Age of Onset    Stroke Mother     Hypertension Mother     Diabetes Mother     Hypertension Father     Prostate cancer Father         metastatic    Heart disease Father     Hypertension Sister     Diabetes Sister     Prostate cancer Brother 50    Prostate cancer Brother 57    Breast cancer Paternal Aunt     Breast cancer Paternal Aunt     Breast cancer Paternal Aunt     Breast cancer Paternal Aunt     Breast cancer Paternal Cousin        Review of Systems: As stated in HPI.      Objective:     Vitals:    23 0856   BP: 133/88   Pulse: 99   Resp: 18   Temp: 97.6 °F (36.4 °C)   TempSrc: Oral   SpO2: 97%   Weight: 62.7 kg (138 lb 3.2 oz)   Height: 5' 1" (1.549 m)     Body mass index is 26.11 kg/m².    PHYSICAL EXAM  GEN: NAD, A&O x3  CV: RRR   LUNGS: CTABL  ABDOMEN: soft, NTND, no masses  INCISIONS: bilateral mastectomy incisions well healed. Abdominal incisions well healed.   VAGINA: Moist, no lesions or masses  VULVA: Normal external genitalia  CERVIX: Well visualized without any masses or lesions. No CMT. Os normal in appearance without bleeding or discharge.   UTERUS: 6-8 cm in size, mid position, mobile, not broad, smooth in contour  ADNEXA: No fullness, masses or tenderness  RECTAL:  Normal, No masses; normal tone; no gross blood.    LABS:  Last mammogram: 2022 BIRADS 2  Last pap: 2022: NILM, HPV (-)  EMB: N/A  Colonoscopy: Referral to Dr. Ye pending per family medicine  Last CBC: 2023: 9.9>12.6/37.3<277  Last Cr: 2023: 0.75    IMAGING:  TVUS:   None, ordered    Assessment:      62 y.o.  with BRCA2+ status and history of triple negative breast cancer for definitive surgical management with TLH/rrBSO/cystoscopy.  1. History of breast cancer        2. BRCA gene positive        3. BRCA2 gene mutation positive  Ambulatory " referral/consult to Gynecology      4. BRCA2 gene mutation positive in female  Ambulatory referral/consult to Gynecology      5. Preoperative exam for gynecologic surgery  US Transvaginal Non OB        Plan:     Counseling: Alternatives to this planned procedure were explained to the patient including expectant, medical and other types of surgical management. This procedure and its risks, reasons, benefits and complications (including injury to bowel, bladder, major blood vessel, ureter, bleeding, possibility of transfusion, infection, scarring, dyspareunia, erosion, further surgery, worsening incontinence, failure of the procedure or fistula formation) were reviewed in detail.    We have reviewed the typical perioperative course with hospital stay, and post-operative precautions and restrictions have been reviewed.    Additionally, ovarian conservation versus risk reducing ovarian resection were discussed with the patient.  She understands the risk for reoperation for ovarian etiology to be 5-10%, the risk for ovarian cancer in women to be 1 in 70, and the protective effects of ovarian hormones including cardiovascular and bone health. Discussed with the patient that given her known BRCA2+ mutation, her lifetime risk of ovarian cancer is estimated ~15%. No recommended screening protocols for ovarian cancer have been established. Discussed this surgery may worsen any menopausal symptoms. Discussed the risk of finding occult cancer on pathology specimen. After discussion, the patient desires risk reducing bilateral salpingoophorectomy.   Pt counseled on the risks related to occult Leiomyosarcoma (1 in 770 to 1 in 10,000 based on recent ACOG publication) and the likely need for morcellation given size of uterine fibroid. Pt verbalized understanding.  TVUS ordered to assess pelvic structures preoperatively  Patient counseled on the fact that cystotomy complicates approximately 0.3 to 11/1000 benign gynecologic  surgeries, especially urogynecologic procedures and hysterectomy.  Patient is aware that, in the event of cystotomy, continuous bladder drainage will be continued for 7-10 days with Tirado catheter in place. Patient particularly counseled on her increased risk for cystotomy given her history of pelvic surgery.   Counseled on ureteral injury rates of 0.2-7.3%, bowel injury rates of <1%.   Transfusion of blood and blood products discussed. Patient was consented for blood transfusion in the case of an emergency.  She understands the possibility of blood transfusion reaction and the attendant risk of transmission of HIV & Hepatitis C to be 1 in 2 million and the risk of Hepatitis B to be 1 in 200,000.  She is aware of the possibility of transfusion reaction. All questions were answered.   Patient understands we are affiliated with a teaching institution and residents and medical students will be involved in her care.  She has consented to an exam under anesthesia and understands that medical students participate in this portion of the procedure.  All questions were answered.    Preop testing ordered.  Surgery case requested. Surgical consents signed.  Instructions reviewed, including NPO after midnight.   Counseled to hold the following medications on the day of surgery: HCTZ   PAT appointment: requested   Current contraception: N/A postmenopausal    To OR for TLH/rrBSO/cystoscopy with Dr. Lira.   Scheduled on 2/7/23    Discussed with Dr. Lira.    Marissa Dale MD  [employer] [facility] PGY3

## 2023-01-30 NOTE — PROGRESS NOTES
Consents signed/witnessed. Consents scanned into patients chart.     Education packet and wipes given to patient upon discharge.     Scheduled patients transvag US for this week per Dr. Dale request. Informed patient of appt this Wednesday at 11 AM with 10:30 arrival time. Patient verbalized understanding.

## 2023-01-30 NOTE — H&P (VIEW-ONLY)
"[facility] Preoperative Visit History and Physical    HPI:   62 y.o.  with a history of triple negative breast cancer, found to be BRCA2+. She presents today to discuss risk-reducing BSO as well as hysterectomy per patient preference.     PMH:  Right IDC of breast, triple negative, Stage IIA. S/p adjuvant chemo and bilateral mastectomy. Does not plan for reconstruction.  HTN  OA - states since childhood, in her back.   Constipation - now has daily BM with use of Miralax    ObHx:    Term  x 4    GynHx:  Menarche, age 14. Menopause, early 50s. States she has been having hot flashes "all her life." Denies trouble sleeping or vaginal dryness.  First child at age 26 years. No abortions or miscarriages. Used birth control pills for 8 years. Did not use hormone replacement therapy after menopause. Breast-fed 1 child for 7 months.   Denies STIs/abnormal paps     SurgHx:  Right mastectomy and lymph node dissection 2022  Left mastectomy 2022  BTL ()  Umbilical hernia repair (, age 6)    FamHx:  2 brothers experienced prostate cancer in their 50s  Father  from prostate cancer in his 60s-70s  Multiple aunts with breast cancer on her father's side of family  Denies history of breast, ovarian, endometrial, colon cancers.    SocialHx:  Former tobacco use: Approximately 20 pack years, started in late 30s and quit when she started her chemo  Denies alcohol/illicit drug use.    All:  NKDA    Meds:  Amlodipine  HCTZ  Olaparib    OB History    Para Term  AB Living   4 4           SAB IAB Ectopic Multiple Live Births                  # Outcome Date GA Lbr [place]/2nd Weight Sex Delivery Anes PTL Lv   4 Para            3 Para            2 Para            1 Para              Past Medical History:   Diagnosis Date    Malignant neoplasm of right breast in female, estrogen receptor negative     infiltrating ductal, s/p right simple mastectomy, SNL biopsy, triple neg    Primary hypertension     " Vitamin D deficiency      Past Surgical History:   Procedure Laterality Date    HERNIA REPAIR      MEDIPORT INSERTION, SINGLE  2022    REMOVAL OF VASCULAR ACCESS PORT Left 2022    Procedure: REMOVAL, VASCULAR ACCESS PORT;  Surgeon: Sofie Powers MD;  Location: [facility] OR;  Service: General;  Laterality: Left;    SIMPLE MASTECTOMY Left 2022    Procedure: MASTECTOMY, SIMPLE;  Surgeon: Sofie Powers MD;  Location: [facility] OR;  Service: General;  Laterality: Left;    SIMPLE MASTECTOMY W/ SENTINEL NODE BIOPSY Right 2022    UMBILICAL HERNIA REPAIR N/A      Prior to Admission medications    Medication Sig Start Date End Date Taking? Authorizing Provider   amLODIPine (NORVASC) 10 MG tablet Take 1 tablet (10 mg total) by mouth once daily. 23  Georgina Lang MD   hydroCHLOROthiazide (MICROZIDE) 12.5 mg capsule Take 1 capsule (12.5 mg total) by mouth once daily. 23   Georgina Lang MD   LIDOcaine (LIDODERM) 5 % Place 1 patch onto the skin once daily. Remove & Discard patch within 12 hours or as directed by MD 23   Georgina Lang MD   olaparib (LYNPARZA) tablet 150 mg Take 2 tablets (300 mg total) by mouth 2 (two) times daily. 23   ULICES Pena   oxyCODONE-acetaminophen (PERCOCET)  mg per tablet Take 1 tablet by mouth every 4 (four) hours as needed for Pain. 22   Hardeep Drummond MD   polyethylene glycol (GLYCOLAX) 17 gram PwPk Take 17 g by mouth once daily. 23   Georgina Lang MD     Review of patient's allergies indicates:  No Known Allergies  Social History     Socioeconomic History    Marital status:    Tobacco Use    Smoking status: Former     Packs/day: 1.00     Years: 24.00     Pack years: 24.00     Types: Cigarettes     Quit date:      Years since quittin.0    Smokeless tobacco: Never   Substance and Sexual Activity    Alcohol use: Not Currently     Comment: Pt. states she drinks  "1/2 pint a day on weekends    Drug use: Yes     Types: Marijuana     Comment: on weekends    Sexual activity: Yes     Partners: Male     Family History   Problem Relation Age of Onset    Stroke Mother     Hypertension Mother     Diabetes Mother     Hypertension Father     Prostate cancer Father         metastatic    Heart disease Father     Hypertension Sister     Diabetes Sister     Prostate cancer Brother 50    Prostate cancer Brother 57    Breast cancer Paternal Aunt     Breast cancer Paternal Aunt     Breast cancer Paternal Aunt     Breast cancer Paternal Aunt     Breast cancer Paternal Cousin        Review of Systems: As stated in HPI.      Objective:     Vitals:    23 0856   BP: 133/88   Pulse: 99   Resp: 18   Temp: 97.6 °F (36.4 °C)   TempSrc: Oral   SpO2: 97%   Weight: 62.7 kg (138 lb 3.2 oz)   Height: 5' 1" (1.549 m)     Body mass index is 26.11 kg/m².    PHYSICAL EXAM  GEN: NAD, A&O x3  CV: RRR   LUNGS: CTABL  ABDOMEN: soft, NTND, no masses  INCISIONS: bilateral mastectomy incisions well healed. Abdominal incisions well healed.   VAGINA: Moist, no lesions or masses  VULVA: Normal external genitalia  CERVIX: Well visualized without any masses or lesions. No CMT. Os normal in appearance without bleeding or discharge.   UTERUS: 6-8 cm in size, mid position, mobile, not broad, smooth in contour  ADNEXA: No fullness, masses or tenderness  RECTAL:  Normal, No masses; normal tone; no gross blood.    LABS:  Last mammogram: 2022 BIRADS 2  Last pap: 2022: NILM, HPV (-)  EMB: N/A  Colonoscopy: Referral to Dr. Ye pending per family medicine  Last CBC: 2023: 9.9>12.6/37.3<277  Last Cr: 2023: 0.75    IMAGING:  TVUS:   None, ordered    Assessment:      62 y.o.  with BRCA2+ status and history of triple negative breast cancer for definitive surgical management with TLH/rrBSO/cystoscopy.  1. History of breast cancer        2. BRCA gene positive        3. BRCA2 gene mutation positive  Ambulatory " referral/consult to Gynecology      4. BRCA2 gene mutation positive in female  Ambulatory referral/consult to Gynecology      5. Preoperative exam for gynecologic surgery  US Transvaginal Non OB        Plan:     Counseling: Alternatives to this planned procedure were explained to the patient including expectant, medical and other types of surgical management. This procedure and its risks, reasons, benefits and complications (including injury to bowel, bladder, major blood vessel, ureter, bleeding, possibility of transfusion, infection, scarring, dyspareunia, erosion, further surgery, worsening incontinence, failure of the procedure or fistula formation) were reviewed in detail.    We have reviewed the typical perioperative course with hospital stay, and post-operative precautions and restrictions have been reviewed.    Additionally, ovarian conservation versus risk reducing ovarian resection were discussed with the patient.  She understands the risk for reoperation for ovarian etiology to be 5-10%, the risk for ovarian cancer in women to be 1 in 70, and the protective effects of ovarian hormones including cardiovascular and bone health. Discussed with the patient that given her known BRCA2+ mutation, her lifetime risk of ovarian cancer is estimated ~15%. No recommended screening protocols for ovarian cancer have been established. Discussed this surgery may worsen any menopausal symptoms. Discussed the risk of finding occult cancer on pathology specimen. After discussion, the patient desires risk reducing bilateral salpingoophorectomy.   Pt counseled on the risks related to occult Leiomyosarcoma (1 in 770 to 1 in 10,000 based on recent ACOG publication) and the likely need for morcellation given size of uterine fibroid. Pt verbalized understanding.  TVUS ordered to assess pelvic structures preoperatively  Patient counseled on the fact that cystotomy complicates approximately 0.3 to 11/1000 benign gynecologic  surgeries, especially urogynecologic procedures and hysterectomy.  Patient is aware that, in the event of cystotomy, continuous bladder drainage will be continued for 7-10 days with Tirado catheter in place. Patient particularly counseled on her increased risk for cystotomy given her history of pelvic surgery.   Counseled on ureteral injury rates of 0.2-7.3%, bowel injury rates of <1%.   Transfusion of blood and blood products discussed. Patient was consented for blood transfusion in the case of an emergency.  She understands the possibility of blood transfusion reaction and the attendant risk of transmission of HIV & Hepatitis C to be 1 in 2 million and the risk of Hepatitis B to be 1 in 200,000.  She is aware of the possibility of transfusion reaction. All questions were answered.   Patient understands we are affiliated with a teaching institution and residents and medical students will be involved in her care.  She has consented to an exam under anesthesia and understands that medical students participate in this portion of the procedure.  All questions were answered.    Preop testing ordered.  Surgery case requested. Surgical consents signed.  Instructions reviewed, including NPO after midnight.   Counseled to hold the following medications on the day of surgery: HCTZ   PAT appointment: requested   Current contraception: N/A postmenopausal    To OR for TLH/rrBSO/cystoscopy with Dr. Lira.   Scheduled on 2/7/23    Discussed with Dr. Lira.    Marissa Dale MD  [employer] [facility] PGY3

## 2023-02-01 ENCOUNTER — HOSPITAL ENCOUNTER (OUTPATIENT)
Dept: RADIOLOGY | Facility: HOSPITAL | Age: 63
Discharge: HOME | End: 2023-02-01
Attending: STUDENT IN AN ORGANIZED HEALTH CARE EDUCATION/TRAINING PROGRAM
Payer: MEDICAID

## 2023-02-01 ENCOUNTER — OFFICE VISIT (OUTPATIENT)
Dept: HEMATOLOGY/ONCOLOGY | Facility: CLINIC | Age: 63
End: 2023-02-01
Payer: MEDICAID

## 2023-02-01 VITALS
HEART RATE: 80 BPM | SYSTOLIC BLOOD PRESSURE: 129 MMHG | RESPIRATION RATE: 20 BRPM | OXYGEN SATURATION: 100 % | DIASTOLIC BLOOD PRESSURE: 82 MMHG | WEIGHT: 136 LBS | BODY MASS INDEX: 25.68 KG/M2 | TEMPERATURE: 98 F | HEIGHT: 61 IN

## 2023-02-01 DIAGNOSIS — Z01.818 PREOPERATIVE EXAM FOR GYNECOLOGIC SURGERY: ICD-10-CM

## 2023-02-01 DIAGNOSIS — C50.919 TRIPLE NEGATIVE MALIGNANT NEOPLASM OF BREAST: Primary | ICD-10-CM

## 2023-02-01 PROCEDURE — 1159F MED LIST DOCD IN RCRD: CPT | Mod: CPTII,,, | Performed by: NURSE PRACTITIONER

## 2023-02-01 PROCEDURE — 3079F DIAST BP 80-89 MM HG: CPT | Mod: CPTII,,, | Performed by: NURSE PRACTITIONER

## 2023-02-01 PROCEDURE — 1160F RVW MEDS BY RX/DR IN RCRD: CPT | Mod: CPTII,,, | Performed by: NURSE PRACTITIONER

## 2023-02-01 PROCEDURE — 99213 PR OFFICE/OUTPT VISIT, EST, LEVL III, 20-29 MIN: ICD-10-PCS | Mod: S$PBB,,, | Performed by: NURSE PRACTITIONER

## 2023-02-01 PROCEDURE — 99213 OFFICE O/P EST LOW 20 MIN: CPT | Mod: S$PBB,,, | Performed by: NURSE PRACTITIONER

## 2023-02-01 PROCEDURE — 1159F PR MEDICATION LIST DOCUMENTED IN MEDICAL RECORD: ICD-10-PCS | Mod: CPTII,,, | Performed by: NURSE PRACTITIONER

## 2023-02-01 PROCEDURE — 3074F SYST BP LT 130 MM HG: CPT | Mod: CPTII,,, | Performed by: NURSE PRACTITIONER

## 2023-02-01 PROCEDURE — 3008F BODY MASS INDEX DOCD: CPT | Mod: CPTII,,, | Performed by: NURSE PRACTITIONER

## 2023-02-01 PROCEDURE — 3074F PR MOST RECENT SYSTOLIC BLOOD PRESSURE < 130 MM HG: ICD-10-PCS | Mod: CPTII,,, | Performed by: NURSE PRACTITIONER

## 2023-02-01 PROCEDURE — 1160F PR REVIEW ALL MEDS BY PRESCRIBER/CLIN PHARMACIST DOCUMENTED: ICD-10-PCS | Mod: CPTII,,, | Performed by: NURSE PRACTITIONER

## 2023-02-01 PROCEDURE — 76856 US EXAM PELVIC COMPLETE: CPT | Mod: TC

## 2023-02-01 PROCEDURE — 3079F PR MOST RECENT DIASTOLIC BLOOD PRESSURE 80-89 MM HG: ICD-10-PCS | Mod: CPTII,,, | Performed by: NURSE PRACTITIONER

## 2023-02-01 PROCEDURE — 99213 OFFICE O/P EST LOW 20 MIN: CPT | Mod: PBBFAC,25 | Performed by: NURSE PRACTITIONER

## 2023-02-01 PROCEDURE — 3008F PR BODY MASS INDEX (BMI) DOCUMENTED: ICD-10-PCS | Mod: CPTII,,, | Performed by: NURSE PRACTITIONER

## 2023-02-01 NOTE — PROGRESS NOTES
Problem List:   IDC Right Breast, Triple Negative; follow up     Current Treatment:  Olaparib 300 mg p.o. twice daily for 1 year or until disease progression or unacceptable toxicity, whichever occurs 1st  Start date pending 2022    Zometa IV Q 6 months   Started 2022    Treatment History:  -Mediport placed on 2022  -AC x4 cycles (2022-2022)  -DD Taxol Q2 weeks x4 cycles (2022-2022)   2022-S/p Left mastectomy and mediport removed     Past medical history: Tobacco abuse. Hypertension. Arthritis of right shoulder.  Procedure/surgical history:  Umbilical hernia repair .  Social history: . Lives in [place] [id number]. Has 4 children. Works as a  in a [email or web address]. Has been smoking 5 cigarettes daily for 15 years. Drinks half a pint of liquor over the weekend. No illicit drugs  Menstrual and OB/GYN history: Menarche, age 14. Menopause, late 40s. First child at age 26 years. No abortions or miscarriages. Used birth control pills for 8 years. Did not use hormone replacement therapy after menopause. Breast-fed 1 child for 7 months.    Family history:  2 brothers experienced prostate cancer in their 50s  Father  from prostate cancer in his 60s-70s  No family history of breast cancer.    History of Present Illness:  61 -year-old female referred from surgery clinic, with newly diagnosed right breast cancer.  She never palpated breast mass. She noticed breast/chest wall pain after extensive muscle use in 2021. At that time, she was diagnosed with musculoskeletal pain. In January, she went back to emergency department when symptoms did not resolve. At that time, physical examination apparently noted irregularity in breast and she was referred for further imaging and biopsy revealing for triple negative breast cancer.     Interval history 2022: Patient presents today alone for scheduled follow up for breast cancer. She is currently taking  Mariely of which she reports adherence. Patient says she is tolerating well. She also says she is scheduled to have ovaries removed upcoming. Lab work reviewed with patient, stable.She denies any pain, unintentional weight loss, N/V/D/C, SOB lymphedema, or adenopathy. Discussed follow up appointments with patient.     Review of Systems: Negative as otherwise stated in HPI    Lab Results   Component Value Date    WBC 6.8 02/01/2023    RBC 4.04 (L) 02/01/2023    HGB 12.0 02/01/2023    HCT 36.1 (L) 02/01/2023    MCV 89.4 02/01/2023    MCH 29.7 02/01/2023    MCHC 33.2 02/01/2023    RDW 16.1 02/01/2023     02/01/2023    MPV 9.6 02/01/2023     CMP  Sodium Level   Date Value Ref Range Status   02/01/2023 142 136 - 145 mmol/L Final     Potassium Level   Date Value Ref Range Status   02/01/2023 3.8 3.5 - 5.1 mmol/L Final     Carbon Dioxide   Date Value Ref Range Status   02/01/2023 28 23 - 31 mmol/L Final     Blood Urea Nitrogen   Date Value Ref Range Status   02/01/2023 10.2 9.8 - 20.1 mg/dL Final     Creatinine   Date Value Ref Range Status   02/01/2023 1.04 (H) 0.55 - 1.02 mg/dL Final     Calcium Level Total   Date Value Ref Range Status   02/01/2023 9.7 8.4 - 10.2 mg/dL Final     Albumin Level   Date Value Ref Range Status   02/01/2023 4.0 3.4 - 4.8 g/dL Final     Bilirubin Total   Date Value Ref Range Status   02/01/2023 0.9 <=1.5 mg/dL Final     Alkaline Phosphatase   Date Value Ref Range Status   02/01/2023 71 40 - 150 unit/L Final     Aspartate Aminotransferase   Date Value Ref Range Status   02/01/2023 14 5 - 34 unit/L Final     Alanine Aminotransferase   Date Value Ref Range Status   02/01/2023 10 0 - 55 unit/L Final     eGFR   Date Value Ref Range Status   02/01/2023 >60 mls/min/1.73/m2 Final     Physical Exam:  Vitals & Measurements  Vitals:    02/01/23 1419   BP: 129/82   Pulse: 80   Resp: 20   Temp: 97.9 °F (36.6 °C)     Assessment:  #IDC right breast, triple negative:  To summarize:  IDC right breast,  triple negative  Right simple mastectomy 04/11/2022  2.2 cm, grade 3, no LVI, margins negative, 4 lymph nodes negative  T2 pN0 MX, G3, triple negative, AJCC anatomic stage IIA, pathological prognostic stage IIA  Postmenopausal per labs (04/28/2022)  S/P adjuvant ddAC x4, then dose dense Taxol x4 (05/30/2022-09/12/2022)   -genetic testing:  positive for deleterious mutation: BRCA2 p.* ([id number]).   -11/01/2022:  Started Zometa 4 mg IV (every 6 months x3-5 years) (for risk reduction of distant metastasis)  -11/18/2022:  Bilateral screening mammography with tomosynthesis (comparison:  03/18/2022 mammogram): Both breasts benign, BI-RADS 2 (continue annual screening mammography with digital breast tomosynthesis)    Plan:  -postmenopausal (per labs 04/28/2022)     S/P adjuvant ddAC x4, then adjuvant dose dense Taxol x4 (05/30/2022-09/12/2022)     pT2 pN0; S/P Bilateral mastectomy   Does not need adjuvant radiotherapy     Genetic testing + for deleterious BRCA2 mutation  Will benefit from 1 year of adjuvant olaparib (triple negative pT2 disease)  Olaparib 300 mg p.o. twice daily for 1 year or until disease progression or unacceptable toxicity, whichever occurs 1st  May be taken with or without food  Monitor for myelodysplastic syndrome/acute myeloid leukemia; pneumonitis; nausea, fatigue, cytopenias, etc.      -Consider adjuvant bisphosphonate therapy for risk reduction of distant metastasis for 3-5 years in postmenopausal patients (natural or induced) with high risk node-negative or node positive tumors  -postmenopausal per labs 04/28/2022  -10/05/2022: Dental clearance obtained  -adjuvant Zometa started 11/01/2022  >>>  Continue Zometa 4 mg IV every 6 months x3-5 years (until 11/2027)-Scheduled for 5/1/2023     Left mastectomy surgery date pending   Will benefit from risk reduction BSO; surgery date pending  Family counseling deferred to      Continue breast cancer surveillance  -History and physical exam  1-4 times per year for 5 years (04/2022-04/2027), then annually;  -Mammogram every 12 months, with no clear advantage to shorter interval imaging;  -Educate, monitor, and referred for lymphedema management  -In the absence of clinical signs or symptoms suggestive of recurrent disease, there is no indication for laboratory or imaging studies for metastasis screening;  -Active lifestyle, healthy diet, limited alcohol intake, and achieving and maintaining an ideal body weight (20-25 BMI) may lead to optimal breast cancer outcomes.  >>>  -S/P Bilateral mastectomy Therefore, no role of surveillance mammography      S/p Left mastectomy and mediport removed on 12/14/2022    Follow up with NP in 4 weeks with lab work prior to Zometa infusion  Continue Olaparib-no refills needed   Mediport dc'd during left mastectomy     Above discussed at length with the patient.  All questions answered.  Discussed the rationale and potential side effects of olaparib, and gave her educational materials from [facility].  -----------------  Discussion:  Olaparib:   Dose: 300 mg p.o. twice daily with or without food.    For moderate renal impairment (creatinine clearance 31-50 mL/min), reduced dose to 200 mg twice daily.   For adverse reactions, consider dose interruption or dose reduction for recommendations.      Warnings and precautions:   1.  Myelodysplastic syndrome/acute myeloid leukemia in less than 1.5% of patients;   2.  Pneumonitis occurs in less than 1% of patients;   3.  Embryo fetal toxicity      Adverse reactions:   Most common adverse reactions (greater than or equal 20%) were anemia, nausea, fatigue, vomiting, neutropenia, leukopenia, nasopharyngitis/URI/influenza, respiratory tract infection, diarrhea, arthralgia/myalgia/dysgeusia, headache, dyspepsia, decreased appetite, constipation and stomatitis.   Most common laboratory abnormalities (in 25% or greater) were anemia, increase in MCV, decrease in lymphocytes, decrease in  leukocytes, decrease in absolute neutrophil count, increasing serum creatinine, and decrease in platelets.

## 2023-02-03 ENCOUNTER — ANESTHESIA EVENT (OUTPATIENT)
Dept: SURGERY | Facility: HOSPITAL | Age: 63
End: 2023-02-03
Payer: MEDICAID

## 2023-02-03 NOTE — ANESTHESIA PREPROCEDURE EVALUATION
02/03/2023  Cookie Ugalde is a 62 y.o., female with PMHx of HTN, HLD, smoking, COPD, h/o breast CA presents for TLH, BS, cysto.    COVID STATUS: VACCINE X 3 (PFIZER, LASTLY 11/5/21); 8/24/22 COVID + (REFUSED MONOCLONAL Ab INFUSION)  BETA-BLOCKER: NONE     PAT NURSE PHONE INTERVIEW 2/6/23     PROBLEM LIST:  -  AUB, UTERINE FIBROID  -  Hx RIGHT BREAST IDC (Dx 3/8/22) - CHEMO 5/30-7/12/22, 8/1-9/12/22, ZOMETA IV (ONLY DOSE 11/1/22) - on LYNPARZA     - S/P 4/11/22 RIGHT SIMPLE MASTECTOMY, SLNBx     - S/P 5/23/22 MEDIPORT     - S/P 12/14/22 LEFT SIMPLE MASTECTOMY (2/2 BRCA 2+), MEDIPORT REMOVAL  -  HTN  -  HLD (NO MEDs)  -  OA - RIGHT SHOULDER  -  PAST SMOKER - QUIT 5/2022, 10+PPY; COPD, LLL PULM. NODULE per 11/29/13 CTA CHEST  -  PAST ETOH (LASTLY 5/2022)- PREV. 1/2 PINT on WEEKENDS  -  MARIJUANA USE     AM Rx DOS: AMLODIPINE, LYNPARZA     ORDERS -   SURGEON: 11/29/13 CTA CHEST; 4/8/22 EKG; 2/1/23 CBC, CMP;  ANESTHESIA: NONE    Pre-op Assessment    I have reviewed the NPO Status.      Review of Systems  Anesthesia Hx:  No problems with previous Anesthesia    Social:  Former Smoker    Hematology/Oncology:         -- Cancer in past history: Breast chemotherapy and surgery    Cardiovascular:   Hypertension, well controlled hyperlipidemia    Pulmonary:   COPD, mild    Renal/:  Renal/ Normal     Hepatic/GI:  Hepatic/GI Normal    Neurological:  Neurology Normal    Endocrine:  Endocrine Normal      Vitals:    02/07/23 0511 02/07/23 0521 02/07/23 0555   BP: 123/79  120/70   Pulse: 88  82   Resp:  20 20   Temp: 36.8 °C (98.2 °F)  36.3 °C (97.3 °F)   TempSrc: Oral  Temporal   SpO2: 99%  100%         Physical Exam  General: Alert, Cooperative and Well nourished    Airway:  Mallampati: II   Mouth Opening: Normal  TM Distance: Normal  Tongue: Normal  Neck ROM: Normal ROM    Dental:  Partial  Dentures    Chest/Lungs:  Clear to auscultation, Normal Respiratory Rate    Heart:  Rate: Normal  Rhythm: Regular Rhythm  Sounds: Normal      Lab Results   Component Value Date    WBC 6.8 02/01/2023    HGB 12.0 02/01/2023    HCT 36.1 (L) 02/01/2023    MCV 89.4 02/01/2023     02/01/2023       CMP  Sodium Level   Date Value Ref Range Status   02/01/2023 142 136 - 145 mmol/L Final     Potassium Level   Date Value Ref Range Status   02/01/2023 3.8 3.5 - 5.1 mmol/L Final     Carbon Dioxide   Date Value Ref Range Status   02/01/2023 28 23 - 31 mmol/L Final     Blood Urea Nitrogen   Date Value Ref Range Status   02/01/2023 10.2 9.8 - 20.1 mg/dL Final     Creatinine   Date Value Ref Range Status   02/01/2023 1.04 (H) 0.55 - 1.02 mg/dL Final     Calcium Level Total   Date Value Ref Range Status   02/01/2023 9.7 8.4 - 10.2 mg/dL Final     Albumin Level   Date Value Ref Range Status   02/01/2023 4.0 3.4 - 4.8 g/dL Final     Bilirubin Total   Date Value Ref Range Status   02/01/2023 0.9 <=1.5 mg/dL Final     Alkaline Phosphatase   Date Value Ref Range Status   02/01/2023 71 40 - 150 unit/L Final     Aspartate Aminotransferase   Date Value Ref Range Status   02/01/2023 14 5 - 34 unit/L Final     Alanine Aminotransferase   Date Value Ref Range Status   02/01/2023 10 0 - 55 unit/L Final     eGFR   Date Value Ref Range Status   02/01/2023 >60 mls/min/1.73/m2 Final        5/6/22 ECHO   The left ventricle is normal in size with normal systolic function.   The estimated ejection fraction is 55%.   Grade I left ventricular diastolic dysfunction.   Normal right ventricular size with normal right ventricular systolic function.   Mild pulmonic regurgitation.   IVC normal.   There is no pulmonary hypertension.     11/29/13 CTA CHEST      12/14/22 ANESTHESIA            Anesthesia Plan  Type of Anesthesia, risks & benefits discussed:    Anesthesia Type: Gen ETT  Intra-op Monitoring Plan: Standard ASA Monitors  Post Op Pain  Control Plan: multimodal analgesia  Induction:  IV  Airway Plan: Direct  Informed Consent: Informed consent signed with the Patient and all parties understand the risks and agree with anesthesia plan.  All questions answered.   ASA Score: 3  Day of Surgery Review of History & Physical: H&P Update referred to the surgeon/provider.    Ready For Surgery From Anesthesia Perspective.     .

## 2023-02-07 ENCOUNTER — ANESTHESIA (OUTPATIENT)
Dept: SURGERY | Facility: HOSPITAL | Age: 63
End: 2023-02-07
Payer: MEDICAID

## 2023-02-07 ENCOUNTER — HOSPITAL ENCOUNTER (OUTPATIENT)
Facility: HOSPITAL | Age: 63
Discharge: HOME | End: 2023-02-07
Attending: OBSTETRICS & GYNECOLOGY | Admitting: OBSTETRICS & GYNECOLOGY
Payer: MEDICAID

## 2023-02-07 VITALS
TEMPERATURE: 98 F | HEART RATE: 74 BPM | RESPIRATION RATE: 18 BRPM | SYSTOLIC BLOOD PRESSURE: 117 MMHG | OXYGEN SATURATION: 97 % | DIASTOLIC BLOOD PRESSURE: 83 MMHG

## 2023-02-07 DIAGNOSIS — Z98.890 POSTOPERATIVE STATE: Primary | ICD-10-CM

## 2023-02-07 DIAGNOSIS — N93.9 ABNORMAL UTERINE BLEEDING (AUB): ICD-10-CM

## 2023-02-07 DIAGNOSIS — Z90.710 STATUS POST HYSTERECTOMY: Primary | ICD-10-CM

## 2023-02-07 DIAGNOSIS — Z15.09 BRCA2 GENE MUTATION POSITIVE: ICD-10-CM

## 2023-02-07 DIAGNOSIS — Z15.01 BRCA2 GENE MUTATION POSITIVE: ICD-10-CM

## 2023-02-07 PROCEDURE — 63600175 PHARM REV CODE 636 W HCPCS: Performed by: NURSE ANESTHETIST, CERTIFIED REGISTERED

## 2023-02-07 PROCEDURE — 37000009 HC ANESTHESIA EA ADD 15 MINS: Performed by: OBSTETRICS & GYNECOLOGY

## 2023-02-07 PROCEDURE — 63600175 PHARM REV CODE 636 W HCPCS: Performed by: ANESTHESIOLOGY

## 2023-02-07 PROCEDURE — 36000711: Performed by: OBSTETRICS & GYNECOLOGY

## 2023-02-07 PROCEDURE — 25000003 PHARM REV CODE 250: Performed by: ANESTHESIOLOGY

## 2023-02-07 PROCEDURE — 00840 ANES IPER PX LOWER ABD NOS: CPT | Performed by: OBSTETRICS & GYNECOLOGY

## 2023-02-07 PROCEDURE — 71000015 HC POSTOP RECOV 1ST HR: Performed by: OBSTETRICS & GYNECOLOGY

## 2023-02-07 PROCEDURE — 25000003 PHARM REV CODE 250: Performed by: OBSTETRICS & GYNECOLOGY

## 2023-02-07 PROCEDURE — 27201423 OPTIME MED/SURG SUP & DEVICES STERILE SUPPLY: Performed by: OBSTETRICS & GYNECOLOGY

## 2023-02-07 PROCEDURE — 71000016 HC POSTOP RECOV ADDL HR: Performed by: OBSTETRICS & GYNECOLOGY

## 2023-02-07 PROCEDURE — 71000033 HC RECOVERY, INTIAL HOUR: Performed by: OBSTETRICS & GYNECOLOGY

## 2023-02-07 PROCEDURE — 36000710: Performed by: OBSTETRICS & GYNECOLOGY

## 2023-02-07 PROCEDURE — 37000008 HC ANESTHESIA 1ST 15 MINUTES: Performed by: OBSTETRICS & GYNECOLOGY

## 2023-02-07 PROCEDURE — 88307 TISSUE EXAM BY PATHOLOGIST: CPT | Performed by: OBSTETRICS & GYNECOLOGY

## 2023-02-07 PROCEDURE — 25000003 PHARM REV CODE 250: Performed by: NURSE ANESTHETIST, CERTIFIED REGISTERED

## 2023-02-07 PROCEDURE — 88108 CYTOPATH CONCENTRATE TECH: CPT | Performed by: OBSTETRICS & GYNECOLOGY

## 2023-02-07 RX ORDER — MORPHINE SULFATE 10 MG/ML
INJECTION INTRAMUSCULAR; INTRAVENOUS; SUBCUTANEOUS
Status: DISCONTINUED
Start: 2023-02-07 | End: 2023-02-07 | Stop reason: HOSPADM

## 2023-02-07 RX ORDER — KETOROLAC TROMETHAMINE 30 MG/ML
INJECTION, SOLUTION INTRAMUSCULAR; INTRAVENOUS
Status: DISCONTINUED | OUTPATIENT
Start: 2023-02-07 | End: 2023-02-07

## 2023-02-07 RX ORDER — PHENYLEPHRINE HYDROCHLORIDE 10 MG/ML
INJECTION INTRAVENOUS
Status: DISCONTINUED | OUTPATIENT
Start: 2023-02-07 | End: 2023-02-07

## 2023-02-07 RX ORDER — OXYCODONE HYDROCHLORIDE 5 MG/1
5 TABLET ORAL
Status: DISCONTINUED | OUTPATIENT
Start: 2023-02-07 | End: 2023-02-07 | Stop reason: HOSPADM

## 2023-02-07 RX ORDER — LIDOCAINE HYDROCHLORIDE 10 MG/ML
INJECTION INFILTRATION; PERINEURAL
Status: DISCONTINUED | OUTPATIENT
Start: 2023-02-07 | End: 2023-02-07 | Stop reason: HOSPADM

## 2023-02-07 RX ORDER — IBUPROFEN 600 MG/1
600 TABLET ORAL 4 TIMES DAILY
Qty: 84 TABLET | Refills: 0 | Status: SHIPPED | OUTPATIENT
Start: 2023-02-07 | End: 2023-02-28

## 2023-02-07 RX ORDER — LIDOCAINE HYDROCHLORIDE 20 MG/ML
INJECTION INTRAVENOUS
Status: DISCONTINUED | OUTPATIENT
Start: 2023-02-07 | End: 2023-02-07

## 2023-02-07 RX ORDER — LIDOCAINE HYDROCHLORIDE 10 MG/ML
INJECTION, SOLUTION EPIDURAL; INFILTRATION; INTRACAUDAL; PERINEURAL
Status: DISCONTINUED
Start: 2023-02-07 | End: 2023-02-07 | Stop reason: HOSPADM

## 2023-02-07 RX ORDER — TRAMADOL HYDROCHLORIDE 50 MG/1
50 TABLET ORAL
Status: COMPLETED | OUTPATIENT
Start: 2023-02-07 | End: 2023-02-07

## 2023-02-07 RX ORDER — OXYCODONE AND ACETAMINOPHEN 5; 325 MG/1; MG/1
1 TABLET ORAL EVERY 4 HOURS PRN
Qty: 15 TABLET | Refills: 0 | Status: SHIPPED | OUTPATIENT
Start: 2023-02-07 | End: 2023-04-17

## 2023-02-07 RX ORDER — MIDAZOLAM HYDROCHLORIDE 1 MG/ML
2 INJECTION INTRAMUSCULAR; INTRAVENOUS ONCE AS NEEDED
Status: COMPLETED | OUTPATIENT
Start: 2023-02-07 | End: 2023-02-07

## 2023-02-07 RX ORDER — CEFAZOLIN SODIUM 1 G/3ML
INJECTION, POWDER, FOR SOLUTION INTRAMUSCULAR; INTRAVENOUS
Status: DISCONTINUED | OUTPATIENT
Start: 2023-02-07 | End: 2023-02-07

## 2023-02-07 RX ORDER — ROCURONIUM BROMIDE 10 MG/ML
INJECTION, SOLUTION INTRAVENOUS
Status: DISCONTINUED | OUTPATIENT
Start: 2023-02-07 | End: 2023-02-07

## 2023-02-07 RX ORDER — ACETAMINOPHEN 325 MG/1
650 TABLET ORAL EVERY 6 HOURS
Qty: 168 TABLET | Refills: 0 | Status: SHIPPED | OUTPATIENT
Start: 2023-02-07 | End: 2023-02-28

## 2023-02-07 RX ORDER — DEXAMETHASONE SODIUM PHOSPHATE 4 MG/ML
INJECTION, SOLUTION INTRA-ARTICULAR; INTRALESIONAL; INTRAMUSCULAR; INTRAVENOUS; SOFT TISSUE
Status: DISCONTINUED | OUTPATIENT
Start: 2023-02-07 | End: 2023-02-07

## 2023-02-07 RX ORDER — POLYETHYLENE GLYCOL 3350 17 G/17G
17 POWDER, FOR SOLUTION ORAL DAILY
Qty: 21 EACH | Refills: 0 | Status: SHIPPED | OUTPATIENT
Start: 2023-02-07 | End: 2023-02-28

## 2023-02-07 RX ORDER — LIDOCAINE HYDROCHLORIDE 10 MG/ML
1 INJECTION, SOLUTION EPIDURAL; INFILTRATION; INTRACAUDAL; PERINEURAL ONCE
Status: DISCONTINUED | OUTPATIENT
Start: 2023-02-07 | End: 2023-07-17

## 2023-02-07 RX ORDER — ACETAMINOPHEN 500 MG
1000 TABLET ORAL
Status: COMPLETED | OUTPATIENT
Start: 2023-02-07 | End: 2023-02-07

## 2023-02-07 RX ORDER — GABAPENTIN 300 MG/1
600 CAPSULE ORAL
Status: COMPLETED | OUTPATIENT
Start: 2023-02-07 | End: 2023-02-07

## 2023-02-07 RX ORDER — DOCUSATE SODIUM 100 MG/1
100 CAPSULE, LIQUID FILLED ORAL 2 TIMES DAILY
Qty: 42 CAPSULE | Refills: 0 | Status: SHIPPED | OUTPATIENT
Start: 2023-02-07 | End: 2023-02-17 | Stop reason: SDUPTHER

## 2023-02-07 RX ORDER — FENTANYL CITRATE 50 UG/ML
INJECTION, SOLUTION INTRAMUSCULAR; INTRAVENOUS
Status: DISCONTINUED | OUTPATIENT
Start: 2023-02-07 | End: 2023-02-07

## 2023-02-07 RX ORDER — POLYETHYLENE GLYCOL 3350 17 G/17G
17 POWDER, FOR SOLUTION ORAL DAILY
Qty: 30 EACH | Refills: 5 | Status: SHIPPED | OUTPATIENT
Start: 2023-02-07 | End: 2023-03-24

## 2023-02-07 RX ORDER — KETAMINE HCL IN 0.9 % NACL 50 MG/5 ML
SYRINGE (ML) INTRAVENOUS
Status: DISCONTINUED | OUTPATIENT
Start: 2023-02-07 | End: 2023-02-07

## 2023-02-07 RX ORDER — MEPERIDINE HYDROCHLORIDE 25 MG/ML
12.5 INJECTION INTRAMUSCULAR; INTRAVENOUS; SUBCUTANEOUS EVERY 10 MIN PRN
Status: DISCONTINUED | OUTPATIENT
Start: 2023-02-07 | End: 2023-02-07 | Stop reason: HOSPADM

## 2023-02-07 RX ORDER — SODIUM CHLORIDE, SODIUM LACTATE, POTASSIUM CHLORIDE, CALCIUM CHLORIDE 600; 310; 30; 20 MG/100ML; MG/100ML; MG/100ML; MG/100ML
INJECTION, SOLUTION INTRAVENOUS CONTINUOUS
Status: DISCONTINUED | OUTPATIENT
Start: 2023-02-07 | End: 2023-07-17

## 2023-02-07 RX ORDER — ONDANSETRON 2 MG/ML
4 INJECTION INTRAMUSCULAR; INTRAVENOUS DAILY PRN
Status: DISCONTINUED | OUTPATIENT
Start: 2023-02-07 | End: 2023-02-07 | Stop reason: HOSPADM

## 2023-02-07 RX ORDER — PROPOFOL 10 MG/ML
VIAL (ML) INTRAVENOUS
Status: DISCONTINUED | OUTPATIENT
Start: 2023-02-07 | End: 2023-02-07

## 2023-02-07 RX ORDER — MIDAZOLAM HYDROCHLORIDE 1 MG/ML
INJECTION INTRAMUSCULAR; INTRAVENOUS
Status: DISCONTINUED
Start: 2023-02-07 | End: 2023-02-07 | Stop reason: HOSPADM

## 2023-02-07 RX ORDER — MORPHINE SULFATE 2 MG/ML
2 INJECTION, SOLUTION INTRAMUSCULAR; INTRAVENOUS EVERY 5 MIN PRN
Status: DISCONTINUED | OUTPATIENT
Start: 2023-02-07 | End: 2023-02-07 | Stop reason: HOSPADM

## 2023-02-07 RX ORDER — SODIUM CHLORIDE 0.9 % (FLUSH) 0.9 %
10 SYRINGE (ML) INJECTION
Status: DISCONTINUED | OUTPATIENT
Start: 2023-02-07 | End: 2023-02-07 | Stop reason: HOSPADM

## 2023-02-07 RX ADMIN — MORPHINE SULFATE 2 MG: 2 INJECTION, SOLUTION INTRAMUSCULAR; INTRAVENOUS at 10:02

## 2023-02-07 RX ADMIN — Medication 15 MG: at 08:02

## 2023-02-07 RX ADMIN — SODIUM CHLORIDE, POTASSIUM CHLORIDE, SODIUM LACTATE AND CALCIUM CHLORIDE: 600; 310; 30; 20 INJECTION, SOLUTION INTRAVENOUS at 07:02

## 2023-02-07 RX ADMIN — SODIUM CHLORIDE, POTASSIUM CHLORIDE, SODIUM LACTATE AND CALCIUM CHLORIDE: 600; 310; 30; 20 INJECTION, SOLUTION INTRAVENOUS at 09:02

## 2023-02-07 RX ADMIN — FENTANYL CITRATE 100 MCG: 50 INJECTION, SOLUTION INTRAMUSCULAR; INTRAVENOUS at 07:02

## 2023-02-07 RX ADMIN — ACETAMINOPHEN 1000 MG: 500 TABLET ORAL at 05:02

## 2023-02-07 RX ADMIN — ROCURONIUM BROMIDE 50 MG: 10 SOLUTION INTRAVENOUS at 07:02

## 2023-02-07 RX ADMIN — PROPOFOL 100 MG: 10 INJECTION, EMULSION INTRAVENOUS at 07:02

## 2023-02-07 RX ADMIN — ROCURONIUM BROMIDE 20 MG: 10 SOLUTION INTRAVENOUS at 08:02

## 2023-02-07 RX ADMIN — PHENYLEPHRINE HYDROCHLORIDE 100 MCG: 10 INJECTION INTRAVENOUS at 09:02

## 2023-02-07 RX ADMIN — SODIUM CHLORIDE, POTASSIUM CHLORIDE, SODIUM LACTATE AND CALCIUM CHLORIDE: 600; 310; 30; 20 INJECTION, SOLUTION INTRAVENOUS at 05:02

## 2023-02-07 RX ADMIN — Medication 25 MG: at 07:02

## 2023-02-07 RX ADMIN — LIDOCAINE HYDROCHLORIDE 50 MG: 20 INJECTION, SOLUTION INTRAVENOUS at 07:02

## 2023-02-07 RX ADMIN — DEXAMETHASONE SODIUM PHOSPHATE 4 MG: 4 INJECTION, SOLUTION INTRA-ARTICULAR; INTRALESIONAL; INTRAMUSCULAR; INTRAVENOUS; SOFT TISSUE at 07:02

## 2023-02-07 RX ADMIN — MIDAZOLAM HYDROCHLORIDE 2 MG: 1 INJECTION, SOLUTION INTRAMUSCULAR; INTRAVENOUS at 06:02

## 2023-02-07 RX ADMIN — ROCURONIUM BROMIDE 20 MG: 10 SOLUTION INTRAVENOUS at 07:02

## 2023-02-07 RX ADMIN — KETOROLAC TROMETHAMINE 30 MG: 30 INJECTION, SOLUTION INTRAMUSCULAR; INTRAVENOUS at 08:02

## 2023-02-07 RX ADMIN — SUGAMMADEX 200 MG: 100 INJECTION, SOLUTION INTRAVENOUS at 09:02

## 2023-02-07 RX ADMIN — Medication 10 MG: at 09:02

## 2023-02-07 RX ADMIN — GABAPENTIN 600 MG: 300 CAPSULE ORAL at 05:02

## 2023-02-07 RX ADMIN — TRAMADOL HYDROCHLORIDE 50 MG: 50 TABLET, COATED ORAL at 05:02

## 2023-02-07 RX ADMIN — CEFAZOLIN 2 G: 330 INJECTION, POWDER, FOR SOLUTION INTRAMUSCULAR; INTRAVENOUS at 07:02

## 2023-02-07 RX ADMIN — PHENYLEPHRINE HYDROCHLORIDE 100 MCG: 10 INJECTION INTRAVENOUS at 07:02

## 2023-02-07 NOTE — ANESTHESIA POSTPROCEDURE EVALUATION
Anesthesia Post Evaluation    Patient: Cookie Ugalde    Procedure(s) Performed: Procedure(s) (LRB):  HYSTERECTOMY,TOTAL,LAPAROSCOPIC,WITH SALPINGO-OOPHORECTOMY (N/A)  CYSTOSCOPY (N/A)    Final Anesthesia Type: general      Patient location during evaluation: DOSC  Post-procedure vital signs: reviewed and stable  Airway patency: patent      Anesthetic complications: no      Cardiovascular status: hemodynamically stable  Respiratory status: spontaneous ventilation  Follow-up not needed.          Vitals Value Taken Time   /83 02/07/23 1023   Temp 36.2 °C (97.2 °F) 02/07/23 1008   Pulse 79 02/07/23 1023   Resp 16 02/07/23 1023   SpO2 98 % 02/07/23 1023         Event Time   Out of Recovery 10:33:00         Pain/Timoteo Score: Pain Rating Prior to Med Admin: 7 (2/7/2023 10:16 AM)  Timoteo Score: 9 (2/7/2023 10:03 AM)

## 2023-02-07 NOTE — ANESTHESIA PROCEDURE NOTES
Intubation    Date/Time: 2/7/2023 7:13 AM  Performed by: Odalys Garza CRNA  Authorized by: Joanne Kate MD     Intubation:     Induction:  Intravenous    Intubated:  Postinduction    Mask Ventilation:  Easy with oral airway    Attempts:  1    Attempted By:  Student    Method of Intubation:  Direct    Blade:  Dave 3    Laryngeal View Grade: Grade I - full view of cords      Difficult Airway Encountered?: No      Complications:  None    Airway Device:  Oral endotracheal tube    Airway Device Size:  7.5    Style/Cuff Inflation:  Cuffed (inflated to minimal occlusive pressure)    Inflation Amount (mL):  6    Tube secured:  21    Secured at:  The lips    Placement Verified By:  Capnometry    Complicating Factors:  None    Findings Post-Intubation:  BS equal bilateral and atraumatic/condition of teeth unchanged

## 2023-02-07 NOTE — TRANSFER OF CARE
Anesthesia Transfer of Care Note    Patient: Cookie Ugalde    Procedure(s) Performed: Procedure(s) (LRB):  HYSTERECTOMY,TOTAL,LAPAROSCOPIC,WITH SALPINGO-OOPHORECTOMY (N/A)  CYSTOSCOPY (N/A)    Patient location: PACU    Anesthesia Type: general    Transport from OR: Transported from OR on room air with adequate spontaneous ventilation    Post pain: adequate analgesia    Post assessment: no apparent anesthetic complications and tolerated procedure well    Post vital signs: stable    Level of consciousness: sedated    Nausea/Vomiting: no nausea/vomiting    Complications: none    Transfer of care protocol was followed

## 2023-02-07 NOTE — OP NOTE
[facility]  Surgery Department  Operative Note    SUMMARY     Date of Procedure: 2/7/2023     Procedure:   Pelvic washings  HYSTERECTOMY,TOTAL,LAPAROSCOPIC  Bilateral risk-reducing SALPINGO-OOPHORECTOMY  CYSTOSCOPY      Surgeon(s) and Role:     * Joelle Lira MD - Primary     * Bull Vela MD - Resident - Assisting     * Marissa Dale MD - Resident - Assisting  *Kirti Noel MD - Resident    Pre-Operative Diagnosis:   BRCA2+ status    Post-Operative Diagnosis:   Same as above    Anesthesia: General    Operative Findings (including complications, if any):   EUA: Normal external genitalia without lesion. Uterus 6week size, mobile. No adnexal masses or fullness Vaginal vault without lesion, discharge, or bleeding. Cervix visualized without lesion or erythema.   Laparoscopically: Complete peritoneal survey without evidence of mass or lesion. Liver, gallbladder, and stomach edge visualized as normal without lesion or adhesion. Appendix visualized and normal without erythema or edema. Normal bilateral fallopian tubes with evidence of prior tubal ligation. Normal bilateral ovaries. Anterior and posterior cul de sac without lesion. Uterus 6wk size, small anterior fundal fibroid noted.   Cystoscopy: Urethra normal without polyp or lesion. Bladder mucosa visualized globally without lesion, petechiae, diverticula, or stones in the trigone, inlet, and dome. Bilateral ureteral orifices visualized with strong efflux of urine and without lesion.    Description of Technical Procedures:    Indication and consent:   Risks/benefits/alternatives of the procedure were reviewed with the patient including but not limited to visceral and vascular injury, infection, blood loss, need for transfusion, prolonged hosiptilaztion, reoperation, need to convert to open approach if not able to be performed safely through the laparoscope. Questions answered and consents reviewed.     Procedure:  Patient was  pre-medicated per ERAS protocol. The patient was taken to the OR with IVF running. Ancef 2 grams IV were administered for infection prophylaxis. SCDs were placed to bilateral lower extremities for DVT prophylaxis. She was placed in dorsal supine position. General endotracheal anesthesia was then administered without incident and an orogastric tube placed in usual fashion. The patient was positioned in dorsal lithotomy position using Chi stirrups, with careful attention to leg positioning. Both arms were tucked in  position with liberal padding of the elbows and hands. A Bovie pad was placed on the right side. A timeout was performed.  A pelvic exam was performed and noted the above findings. The patient was then prepped and draped in the usual sterile fashion.     Attention was paid to the vagina where a denis catheter was inserted into the bladder. A speculum was placed in the vagina. The cervix was identified and grasped with a single tooth tenaculum. The uterus was sound to 6 cm. The MIKAYLA Arch manipulator was assembled and inserted into the uterus. The intrauterine balloon was inflated with 5cc of saline. The cervical cup was placed around the cervix.       Attention was then paid to the laparoscopic portion of the procedure. A 5-mm incision was made with a scalpel 8cm lateral to the umbilicus on the left side of the patient. With direct visualization, a 5-mm trocar introduced into the abdomen. Upon confirmation of entry into the abdomen, low-flow CO2 gas was initiated with confirmation of low pressure and thus high-flow was initiated to a level of 15 mmHg. No visceral or vascular injury occurred with entry into abdomen. Pelvic washings were collected. At that time, Trendelenberg position was obtained to keep the bowel out of the operative field.    A complete peritoneal survey was performed with the above noted findings. Four additional 5mm trocars were then placed under direct visualization;1 in  umbilicus, 1 in the RLQ, and 1 in the LLQ, and one in the right mid-abdomen.     Attention was turned to the right pelvis and the ureter was identified peristalsing at the level of the pelvic brim. The fimbriated end of the right fallopian tube was grasped, ligated and transected . The mesosalpinx of the right fallopian tube was grasped and sequentially ligated and transected until reaching the right cornua, keeping the mesosalpinx intact below the area of prior ligation. At the cornua the fallopian tube was transected and the tube was removed, marked with suture and sent for pathology. Attention was turned to the left and the ureter was identified at the level of the pelvic brim. The left fallopian tube was grasped, ligated and transected at the level of the cornua. The medial segment of the tube was sequentially grasped, ligated, transected, and removed and sent to pathology. The distal segment of the tube was then grasped, ligated, transected and sent to pathology.     Attention was then turned to the right aspect of the uterus. The right round ligament was identified and transected laterally. The retroperitoneal space was opened and the ureter was identified peristalsing. A window was then created in the posterior broad ligament to skeletonize the IP ligament. The IP ligament was transected 3cm away from the ovary. The ureter was again identified and kept in view as to not injure it. The upper edge of the KOH ring was then palpated laparoscopically. The anterior leaf of the broad ligament was carefully dissected down to the level of the ring. Attention was then turned to the development of the bladder flap. The bladder was dissected off of the uterus using sharp and blunt dissection. The atraumatic graspers were used on the bladder to provide counter traction and the bladder was eventually dropped down and out of the proximity of the uterine blood supply and off of the planned colpotomy site. the right uterine  vessels were grasped, ligated and transected.     Attention was then turned to the left aspect of the uterus. The left round ligament was identified and transected laterally. The retroperitoneal space was opened and the ureter was identified peristalsing. A window was then created in the posterior broad ligament to skeletonize the left IP ligament. The IP ligament was transected 3cm away from the ovary. The ureter was again identified and kept in view as to not injure it. The upper edge of the KOH ring was then palpated laparoscopically. The anterior leaf of the broad ligament was carefully dissected down to the level of the ring. The remainder of the bladder flap was created using blunt ands sharp dissection. The atraumatic graspers were used on the bladder to provide counter traction and the bladder was eventually dropped down and out of the proximity of the uterine blood supply and off of the planned colpotomy site. The left uterine vessels were then grasped, ligated and transected.      After adequate dissection, the cervicovaginal junction was clearly identified and the colpotomy was performed along the upper edge of the Koh ring. The MIKAYLA manipulator and specimen were removed through the vagina without difficulty.    The cuff was closed laparoscopically in a horizontal fashion in 2 layers using 0 Stratafix suture in a running method. The pelvis was copiously irrigated and suctioned with good hemostasis noted. One sheet of surgicel was placed in the left pelvis for additional hemostasis.    Attention was then paid to the cystoscopy, where the 70 degree cystoscope was introduced into the bladder without difficulty. The bladder was inspected and noted to be intact without evidence of trauma. There was noted to be efflux of urine from bilateral ureteral orifices. The cystoscope was removed.     All specimens were collected, labeled and sent to pathology for analysis. The remaining trocars were deflated and removed  under direct visualization after 5 manual forced breaths. The skin incisions were closed using 4-0 monocryl. The incisions were then injected with 10cc of lidocaine. The skin incisions covered with dermabond. A vaginal sweep was performed and revealed no retained instruments.     All instrument, sponge counts were correct times two. The patient tolerated the procedure well. She was extubated and transferred to recovery in stable condition.      Dr. Lira was present and scrubbed for the entirety of the procedure.    Estimated Blood Loss (EBL): 10 mL    IVF: 1000mL crystalloid     Urine Output: 125 mL clear yellow urine via Tirado           Implants: * No implants in log *    Specimens:   Specimen (24h ago, onward)       Start     Ordered    02/07/23 0903  Specimen to Pathology  RELEASE UPON ORDERING        Comments: Specimen B: BRCA positive     References:    Click here for ordering Quick Tip   Question:  Release to patient  Answer:  Immediate    02/07/23 0903    02/07/23 0807  Cytology, Fluid/Wash/Arnett  RELEASE UPON ORDERING        Question:  Release to patient  Answer:  Immediate    02/07/23 0807                    Condition: Good    Disposition: PACU - hemodynamically stable.    Marissa Dale MD  [facility] PGY3

## 2023-02-07 NOTE — INTERVAL H&P NOTE
63 yo F with h/o triple negative breast cancer and BRCA2+.    The patient has been examined and the H&P has been reviewed:    I concur with the findings and no changes have occurred since H&P was written.    Surgery risks, benefits and alternative options discussed and understood by patient/family.    To OR for rrBSO/hysterectomy/cysto    Kirti Noel MD MPH   [facility], PGY-2

## 2023-02-07 NOTE — BRIEF OP NOTE
[facility] - Periop Services  Brief Operative Note    Surgery Date: 2/7/2023     Surgeon(s) and Role:     * Joelle Lira MD - Primary     * Bull Vela MD - Resident - Assisting     * Marissa Dale MD - Resident - Assisting  *Kirti Noel MD - Resident    Pre-op Diagnosis:    BRCA2+ positive status    Post-op Diagnosis:    Same as above    Procedure(s) (LRB):  Pelvic washings  HYSTERECTOMY,TOTAL,LAPAROSCOPIC  Bilateral risk-reducing SALPINGO-OOPHORECTOMY  CYSTOSCOPY     Anesthesia: General    Operative Findings: EUA: Normal external genitalia without lesion. Uterus 6week size, mobile. No adnexal masses or fullness Vaginal vault without lesion, discharge, or bleeding. Cervix visualized without lesion or erythema.   Laparoscopically: Complete peritoneal survey without evidence of mass or lesion. Liver, gallbladder, and stomach edge visualized as normal without lesion or adhesion. Appendix visualized and normal without erythema or edema. Normal bilateral fallopian tubes with evidence of prior tubal ligation. Normal bilateral ovaries. Anterior and posterior cul de sac without lesion. Uterus 6wk size, small anterior fundal fibroid noted.   Cystoscopy: Urethra normal without polyp or lesion. Bladder mucosa visualized globally without lesion, petechiae, diverticula, or stones in the trigone, inlet, and dome. Bilateral ureteral orifices visualized with strong efflux of urine and without lesion.     Estimated Blood Loss: 10 mL    IVF: 1000mL crystalloid    Urine Output: 125 mL clear yellow urine via Tirado         Specimens:   Specimen (24h ago, onward)       Start     Ordered    02/07/23 0903  Specimen to Pathology  RELEASE UPON ORDERING        Comments: Specimen B: BRCA positive     References:    Click here for ordering Quick Tip   Question:  Release to patient  Answer:  Immediate    02/07/23 0903    02/07/23 0807  Cytology, Fluid/Wash/Gail  RELEASE UPON ORDERING        Question:  Release to patient   Answer:  Immediate    02/07/23 0807                  Discharge Note    OUTCOME: Patient tolerated treatment/procedure well without complication and is now ready for discharge.    DISPOSITION: Home or Self Care    FINAL DIAGNOSIS:  Status post hysterectomy    FOLLOWUP: In clinic    DISCHARGE INSTRUCTIONS:    Discharge Procedure Orders   Diet Adult Regular     Pelvic Rest     Notify your health care provider if you experience any of the following:  persistent nausea and vomiting or diarrhea     Notify your health care provider if you experience any of the following:  temperature >100.4     Notify your health care provider if you experience any of the following:  severe uncontrolled pain     Notify your health care provider if you experience any of the following:  redness, tenderness, or signs of infection (pain, swelling, redness, odor or green/yellow discharge around incision site)     Notify your health care provider if you experience any of the following:  difficulty breathing or increased cough     Notify your health care provider if you experience any of the following:  increased confusion or weakness     Activity as tolerated   Order Comments: Nothing in the vagina for 6 weeks  No tub baths for 6 weeks  No lifting heavier than 10 lb for 6 weeks  No dirving for 2 weeks or while taking narcotics     Marissa Dale MD  [facility] PGY3

## 2023-02-07 NOTE — DISCHARGE INSTRUCTIONS
Pelvic rest x 6 weeks, no tub baths or swimming x 6 weeks    May shower tomorrow    Ambulate and eat tonight; increase fiber in foods, increase fluid intake    Ibuprofen and Tylenol every 6  hours. Ex. Tylenol at 3 pm; Ibuprofen at 6 pm,, Tylenol at 9 pm, Ibuprofen at 12 am....  Oxycodone for breakthrough pain    Ice to area as needed.

## 2023-02-08 PROBLEM — N93.9 ABNORMAL UTERINE BLEEDING (AUB): Status: ACTIVE | Noted: 2023-02-08

## 2023-02-08 LAB
ESTROGEN SERPL-MCNC: NORMAL PG/ML
ESTROGEN SERPL-MCNC: NORMAL PG/ML
INSULIN SERPL-ACNC: NORMAL U[IU]/ML
INSULIN SERPL-ACNC: NORMAL U[IU]/ML
LAB AP CLINICAL INFORMATION: NORMAL
LAB AP CLINICAL INFORMATION: NORMAL
LAB AP GROSS DESCRIPTION: NORMAL
LAB AP GROSS DESCRIPTION: NORMAL
LAB AP REPORT FOOTNOTES: NORMAL
T3RU NFR SERPL: NORMAL %
T3RU NFR SERPL: NORMAL %

## 2023-02-17 ENCOUNTER — OFFICE VISIT (OUTPATIENT)
Dept: GYNECOLOGY | Facility: CLINIC | Age: 63
End: 2023-02-17
Payer: MEDICAID

## 2023-02-17 VITALS
RESPIRATION RATE: 18 BRPM | OXYGEN SATURATION: 100 % | WEIGHT: 134 LBS | HEIGHT: 62 IN | HEART RATE: 69 BPM | TEMPERATURE: 98 F | BODY MASS INDEX: 24.66 KG/M2 | SYSTOLIC BLOOD PRESSURE: 126 MMHG | DIASTOLIC BLOOD PRESSURE: 85 MMHG

## 2023-02-17 DIAGNOSIS — Z90.722 H/O BILATERAL OOPHORECTOMY: ICD-10-CM

## 2023-02-17 DIAGNOSIS — K59.00 CONSTIPATION, UNSPECIFIED CONSTIPATION TYPE: ICD-10-CM

## 2023-02-17 DIAGNOSIS — Z90.710 STATUS POST HYSTERECTOMY: Primary | ICD-10-CM

## 2023-02-17 PROCEDURE — 99215 OFFICE O/P EST HI 40 MIN: CPT | Mod: PBBFAC

## 2023-02-17 RX ORDER — POLYETHYLENE GLYCOL 3350 17 G/17G
17 POWDER, FOR SOLUTION ORAL DAILY
Qty: 21 EACH | Refills: 0 | Status: SHIPPED | OUTPATIENT
Start: 2023-02-17 | End: 2023-03-10

## 2023-02-17 RX ORDER — DOCUSATE SODIUM 100 MG/1
100 CAPSULE, LIQUID FILLED ORAL 2 TIMES DAILY
Qty: 42 CAPSULE | Refills: 0 | Status: SHIPPED | OUTPATIENT
Start: 2023-02-17 | End: 2023-03-10

## 2023-02-17 NOTE — PROGRESS NOTES
[facility] CL[facility] NOTE  [facility]  [address] [place] [address]  Phone: [phone]  Fax: [phone]    Postoperative Follow-Up    Subjective:      Cookie Ugalde is a 62 y.o.  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    Eating smaller amount that usual, but tolerating PO in take without nausea or vomiting. Last bowel movement 2 days ago which was hard and required straining. Patient is not complaining of pain currently. Controlled on medications. No episodes of vaginal bleeding.    Patient's medications, allergies, past medical, surgical, social and family histories were reviewed and updated as appropriate.    Operative Findings:  EBL: 10 mL  Uterus: 58 g    EUA: Normal external genitalia without lesion. Uterus 6week size, mobile. No adnexal masses or fullness Vaginal vault without lesion, discharge, or bleeding. Cervix visualized without lesion or erythema.   Laparoscopically: Complete peritoneal survey without evidence of mass or lesion. Liver, gallbladder, and stomach edge visualized as normal without lesion or adhesion. Appendix visualized and normal without erythema or edema. Normal bilateral fallopian tubes with evidence of prior tubal ligation. Normal bilateral ovaries. Anterior and posterior cul de sac without lesion. Uterus 6wk size, small anterior fundal fibroid noted.   Cystoscopy: Urethra normal without polyp or lesion. Bladder mucosa visualized globally without lesion, petechiae, diverticula, or stones in the trigone, inlet, and dome. Bilateral ureteral orifices visualized with strong efflux of urine and without lesion.    Review of Systems  Denies fevers, chills, blurry vision, nausea, vomiting, or dizziness. Denies chest pain, shortness of breath, or calf pain.     Objective:     Vitals:    23 0920   BP: 126/85   BP Location: Right arm   Patient Position: Sitting   BP Method: Small  "(Automatic)   Pulse: 69   Resp: 18   Temp: 97.9 °F (36.6 °C)   TempSrc: Oral   SpO2: 100%   Weight: 60.8 kg (134 lb)   Height: 5' 2" (1.575 m)       Physical Exam:     General: alert and oriented, in no acute distress  Lungs: clear to auscultation bilaterally, no conversational dyspnea  Heart: regular rate and rhythm  Abdomen: soft, non-distended, non tender to palpation, no involuntary guarding, no rebound tenderness, bowel sounds normoactive  Incision Sites: laparoscopic incisions clean/dry/intact  Extremities: bilateral lower extremities normal, atraumatic, non-edematous, without calf tenderness or significant calf/ankle edema      Pathology:  Final Diagnosis   Uterus, cervix, right and left fallopian tubes, hysterectomy:  -Endometrium: Proliferative phase.  -Myometrium: Leiomyoma  -Cervix: No significant histopathologic changes.  -Right and left fallopian tubes and ovaries: No significant changes.       Assessment:     Cookie Ugalde is a 62 y.o.  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 Having post-operative constipation, but otherwise doing well.  Operative findings again reviewed. Pathology report discussed.     Plan:     Rx colace 100 mg BID and Miralax daily, discussed adequate hydration and high fiber diet    Continue any current medications  Activity as tolerated, okay to return to work   Continue pelvic rest until next post-op visit   Keep scheduled post-op follow up 3/24/23     Patient and plan were discussed with Dr. Lira.    Fabby Gray MD  [facility] Resident, [facility]      "

## 2023-03-24 ENCOUNTER — OFFICE VISIT (OUTPATIENT)
Dept: GYNECOLOGY | Facility: CLINIC | Age: 63
End: 2023-03-24
Payer: MEDICAID

## 2023-03-24 ENCOUNTER — TELEPHONE (OUTPATIENT)
Dept: GYNECOLOGY | Facility: CLINIC | Age: 63
End: 2023-03-24
Payer: MEDICAID

## 2023-03-24 DIAGNOSIS — Z98.890 POSTOPERATIVE STATE: Primary | ICD-10-CM

## 2023-03-24 RX ORDER — DOCUSATE SODIUM 100 MG/1
100 CAPSULE, LIQUID FILLED ORAL DAILY PRN
Qty: 30 CAPSULE | Refills: 1 | Status: SHIPPED | OUTPATIENT
Start: 2023-03-24 | End: 2023-04-17

## 2023-03-24 RX ORDER — POLYETHYLENE GLYCOL 3350 17 G/17G
17 POWDER, FOR SOLUTION ORAL DAILY
Qty: 850 G | Refills: 0 | Status: SHIPPED | OUTPATIENT
Start: 2023-03-24 | End: 2023-04-17 | Stop reason: SDUPTHER

## 2023-03-24 NOTE — TELEPHONE ENCOUNTER
Good Afternoon,     She was seen today for her last postop.   She left before she got her next appointment.  Can you make her an appointment with NP for annual in one year.   I will call her with it.   Thank You, Ryanne

## 2023-03-24 NOTE — PROGRESS NOTES
[facility]  [facility]  [address] [place] [address]  Phone: [phone]  Fax: [phone]    Postoperative Follow-Up    Subjective:      Cookie Ugalde is a 62 y.o.  with h/o stage IIA triple negative BRCA2+ IDC right breast cancer s/p TLH and RRBSO 2/2 on 23 who presents to the clinic 6 weeks post-op.    Tolerating regular diet without nausea or vomiting. Pain has resolved. Patient is very pleased with her surgery. No episodes of vaginal bleeding. He still suffers with constipation but is improving. Taking Miralax twice daily.    Patient's medications, allergies, past medical, surgical, social and family histories were reviewed and updated as appropriate.    Operative Findings:  EBL: 10 mL  Uterus: 58 g    EUA: Normal external genitalia without lesion. Uterus 6week size, mobile. No adnexal masses or fullness Vaginal vault without lesion, discharge, or bleeding. Cervix visualized without lesion or erythema.   Laparoscopically: Complete peritoneal survey without evidence of mass or lesion. Liver, gallbladder, and stomach edge visualized as normal without lesion or adhesion. Appendix visualized and normal without erythema or edema. Normal bilateral fallopian tubes with evidence of prior tubal ligation. Normal bilateral ovaries. Anterior and posterior cul de sac without lesion. Uterus 6wk size, small anterior fundal fibroid noted.   Cystoscopy: Urethra normal without polyp or lesion. Bladder mucosa visualized globally without lesion, petechiae, diverticula, or stones in the trigone, inlet, and dome. Bilateral ureteral orifices visualized with strong efflux of urine and without lesion.    Review of Systems  Denies fevers, chills, blurry vision, nausea, vomiting, or dizziness. Denies chest pain, shortness of breath, or calf pain.     Objective:     There were no vitals filed for this visit.    Physical Exam:     General: alert and oriented, in no acute distress  Lungs: clear to auscultation  bilaterally, no conversational dyspnea  Heart: regular rate and rhythm  Abdomen: soft, non-distended, non tender to palpation, no involuntary guarding, no rebound tenderness, bowel sounds normoactive  Incision Sites: laparoscopic incisions clean/dry/intact  Extremities: bilateral lower extremities normal, atraumatic, non-edematous, without calf tenderness or significant calf/ankle edema  Pelvic: Normal external female genitalia. No lesions/masses. [place] pink and moist with cuff intact. No lesions, masses, blood or discharge. Well healed.       Pathology:  Final Diagnosis   Uterus, cervix, right and left fallopian tubes, hysterectomy:  -Endometrium: Proliferative phase.  -Myometrium: Leiomyoma  -Cervix: No significant histopathologic changes.  -Right and left fallopian tubes and ovaries: No significant changes.       Assessment:     Cookie Ugalde is a 62 y.o.  with h/o stage IIA triple negative BRCA2+ IDC right breast cancer s/p TLH and RRBSO 2/2 on 23 who presents to the [id number] weeks post-op.     Operative findings again reviewed. Pathology report discussed.     Plan:     Constipation, continue daily Miralax and colace. Rx sent to pharmacy  Activity as tolerated, okay to return to work   Pelvic rest restriction removed    RTC for annual with NP  Patient and plan were discussed with Dr. Nelson Coombs MD PGY-4  Obstetrics and Gynecology

## 2023-04-17 ENCOUNTER — OFFICE VISIT (OUTPATIENT)
Dept: FAMILY MEDICINE | Facility: CLINIC | Age: 63
End: 2023-04-17
Payer: MEDICAID

## 2023-04-17 ENCOUNTER — LAB VISIT (OUTPATIENT)
Dept: LAB | Facility: HOSPITAL | Age: 63
End: 2023-04-17
Attending: NURSE PRACTITIONER
Payer: MEDICAID

## 2023-04-17 VITALS
HEIGHT: 62 IN | WEIGHT: 136 LBS | TEMPERATURE: 98 F | SYSTOLIC BLOOD PRESSURE: 120 MMHG | DIASTOLIC BLOOD PRESSURE: 80 MMHG | HEART RATE: 88 BPM | RESPIRATION RATE: 20 BRPM | BODY MASS INDEX: 25.03 KG/M2 | OXYGEN SATURATION: 100 %

## 2023-04-17 DIAGNOSIS — M79.10 MYALGIA: ICD-10-CM

## 2023-04-17 DIAGNOSIS — Z98.890 POSTOPERATIVE STATE: ICD-10-CM

## 2023-04-17 DIAGNOSIS — Z11.4 ENCOUNTER FOR SCREENING FOR HIV: ICD-10-CM

## 2023-04-17 DIAGNOSIS — E78.5 HYPERLIPIDEMIA, UNSPECIFIED HYPERLIPIDEMIA TYPE: ICD-10-CM

## 2023-04-17 DIAGNOSIS — Z13.1 DIABETES MELLITUS SCREENING: ICD-10-CM

## 2023-04-17 DIAGNOSIS — Z11.4 ENCOUNTER FOR SCREENING FOR HIV: Primary | ICD-10-CM

## 2023-04-17 DIAGNOSIS — Z12.11 COLON CANCER SCREENING: ICD-10-CM

## 2023-04-17 DIAGNOSIS — I10 PRIMARY HYPERTENSION: ICD-10-CM

## 2023-04-17 LAB
CHOLEST SERPL-MCNC: 246 MG/DL
CHOLEST/HDLC SERPL: 4 {RATIO} (ref 0–5)
EST. AVERAGE GLUCOSE BLD GHB EST-MCNC: 108.3 MG/DL
HBA1C MFR BLD: 5.4 %
HDLC SERPL-MCNC: 61 MG/DL (ref 35–60)
HIV 1+2 AB+HIV1 P24 AG SERPL QL IA: NONREACTIVE
LDLC SERPL CALC-MCNC: 167 MG/DL (ref 50–140)
TRIGL SERPL-MCNC: 91 MG/DL (ref 37–140)
VLDLC SERPL CALC-MCNC: 18 MG/DL

## 2023-04-17 PROCEDURE — 99215 OFFICE O/P EST HI 40 MIN: CPT | Mod: PBBFAC | Performed by: NURSE PRACTITIONER

## 2023-04-17 PROCEDURE — 83036 HEMOGLOBIN GLYCOSYLATED A1C: CPT

## 2023-04-17 PROCEDURE — 36415 COLL VENOUS BLD VENIPUNCTURE: CPT

## 2023-04-17 PROCEDURE — 90677 PCV20 VACCINE IM: CPT | Mod: PBBFAC

## 2023-04-17 PROCEDURE — 87389 HIV-1 AG W/HIV-1&-2 AB AG IA: CPT

## 2023-04-17 PROCEDURE — 80061 LIPID PANEL: CPT

## 2023-04-17 RX ORDER — AMLODIPINE BESYLATE 10 MG/1
10 TABLET ORAL DAILY
Qty: 90 TABLET | Refills: 0 | Status: SHIPPED | OUTPATIENT
Start: 2023-04-17 | End: 2023-07-17 | Stop reason: SDUPTHER

## 2023-04-17 RX ORDER — LIDOCAINE 50 MG/G
1 PATCH TOPICAL DAILY
Qty: 30 PATCH | Refills: 2 | Status: SHIPPED | OUTPATIENT
Start: 2023-04-17 | End: 2023-07-17 | Stop reason: SDUPTHER

## 2023-04-17 RX ORDER — HYDROCHLOROTHIAZIDE 12.5 MG/1
12.5 CAPSULE ORAL DAILY
Qty: 90 CAPSULE | Refills: 0 | Status: SHIPPED | OUTPATIENT
Start: 2023-04-17 | End: 2023-07-17 | Stop reason: SDUPTHER

## 2023-04-17 RX ORDER — POLYETHYLENE GLYCOL 3350 17 G/17G
17 POWDER, FOR SOLUTION ORAL DAILY
Qty: 850 G | Refills: 2 | Status: SHIPPED | OUTPATIENT
Start: 2023-04-17 | End: 2023-07-17 | Stop reason: SDUPTHER

## 2023-04-17 NOTE — PROGRESS NOTES
Family Medicine Clinic Note:    PCP: Georgina Lang MD    Cookie Ugalde is a 62 y.o. female   per record: s/p TLH/risk reducing BSO/cystoscopy on 23 in the setting of BRCA2+ status, stage IIa triple negative Breast cancer with Bilateral mastectomy 2/2 IDC ( on Olaparib 300 mg p.o. twice daily for 1 year followed by heme/onc and Zometa q 6 months x 3-5 yrs), HTN, constipation, OA presents to clinic today for f/up regarding HTN and medication refills      Subjective:       HTN  Bp today is stable today  Denies chest pain, sob or palpitaions, dizziness  Needs refills on amlodipine and HCTZ    Constipation   Having  daily soft stools with the concetta lax   No abd pain or blood in the stool   Requesting refill on miralax      -Requesting refill on lidoderm path          ROS  As per HPI  Objective:     Physical Exam  Constitutional: NAD  Lungs: CTAB, No crackles, No wheezes  Cardiovascular: RRR, No MRG  Abdomen: Soft, Nontender, No palpable hepatosplenomegaly, No abdominal mass  Extremities: No edema BLE    Assessment/Plan:   Cookie Ugalde is a 62 y.o. female with:    HTN  Refilled amlodipine and HCTZ  Plans to ride bicycle for exercise  Bp stable   Ordered lipid  ( ), A1C       Functional Constipation  -Encouraged dietary modifications such as drinking waster intake and increasing fiber intake with fruits and vegetable  -Increase scheduled toileting  -Refilled the miralx    -Refilled lidocaine patch per pt request    HM:  Ambulatory referral to Gastroenterology for colonoscopy   Ordered HIV screen today  Gave Pneumonia vaccine today         Staff: Dr. Peter Pugh MD  Family Medicine PGY-2

## 2023-04-19 NOTE — PROGRESS NOTES
Faculty addendum: Patient discussed with resident on day of encounter 4/17/23.  Care provided reasonable and necessary. I participated in the management of the patient and was immediately available throughout the encounter. Services were furnished in a primary care center located in the outpatient department of a [facility]. I agree with the resident's findings and plan as documented in the resident's note.     Alena Green, DO

## 2023-04-28 NOTE — PROGRESS NOTES
Problem List:   IDC Right Breast, Triple Negative; follow up     Current Treatment:  Olaparib 300 mg p.o. twice daily for 1 year or until disease progression or unacceptable toxicity, whichever occurs 1st  Start date pending 2022    Zometa IV Q 6 months   Started 2022    Treatment History:  -Mediport placed on 2022  -AC x4 cycles (2022-2022)  -DD Taxol Q2 weeks x4 cycles (2022-2022)     Past medical history: Tobacco abuse. Hypertension. Arthritis of right shoulder.  Procedure/surgical history:  Umbilical hernia repair .  [facility] BSO 2022-S/p Left mastectomy and mediport removed         Social history: . Lives in [place], [place]. Has 4 children. Works as a  in a [employer]. Has been smoking 5 cigarettes daily for 15 years. Drinks half a pint of liquor over the weekend. No illicit drugs  Menstrual and OB/GYN history: Menarche, age 14. Menopause, late 40s. First child at age 26 years. No abortions or miscarriages. Used birth control pills for 8 years. Did not use hormone replacement therapy after menopause. Breast-fed 1 child for 7 months.    Family history:  2 brothers experienced prostate cancer in their 50s  Father  from prostate cancer in his 60s-70s  No family history of breast cancer.    History of Present Illness:  62 -year-old female referred from surgery clinic, with newly diagnosed right breast cancer.  She never palpated breast mass. She noticed breast/chest wall pain after extensive muscle use in 2021. At that time, she was diagnosed with musculoskeletal pain. In January, she went back to emergency department when symptoms did not resolve. At that time, physical examination apparently noted irregularity in breast and she was referred for further imaging and biopsy revealing for triple negative breast cancer.     Interval history 2023: Patient presents today alone for scheduled follow up for breast cancer. She  is scheduled for Zometa infusion today.  She is currently taking Lynparza of which she reports adherence. Patient says she is tolerating well. She reports having a total hysterectomy 1/2023 and recovered well.  He also had medi port removed for her most recent left mastectomy. She denies any pain, unintentional weight loss, N/V/D/C, SOB lymphedema, or adenopathy.  Lab work reviewed with patient, stable. Discussed follow up appointments with patient. Denies any other issues or concerns at this time.     Review of Systems: Negative as otherwise stated in HPI      Vitals & Measurements  Vitals:    05/01/23 1350   BP: 127/83   Pulse: 78   Resp: 18   Temp: 98.1 °F (36.7 °C)         Assessment:  #IDC right breast, triple negative:  To summarize:  IDC right breast, triple negative  Right simple mastectomy 04/11/2022  2.2 cm, grade 3, no LVI, margins negative, 4 lymph nodes negative  T2 pN0 MX, G3, triple negative, AJCC anatomic stage IIA, pathological prognostic stage IIA  Postmenopausal per labs (04/28/2022)  S/P adjuvant ddAC x4, then dose dense Taxol x4 (05/30/2022-09/12/2022)   -genetic testing:  positive for deleterious mutation: BRCA2 p.* ([id number]).   -11/01/2022:  Started Zometa 4 mg IV (every 6 months x3-5 years) (for risk reduction of distant metastasis)  -11/18/2022:  Bilateral screening mammography with tomosynthesis (comparison:  03/18/2022 mammogram): Both breasts benign, BI-RADS 2 (continue annual screening mammography with digital breast tomosynthesis)    Plan:  -postmenopausal (per labs 04/28/2022)     S/P adjuvant ddAC x4, then adjuvant dose dense Taxol x4 (05/30/2022-09/12/2022)     pT2 pN0; S/P Bilateral mastectomy   Does not need adjuvant radiotherapy     Genetic testing + for deleterious BRCA2 mutation  Will benefit from 1 year of adjuvant olaparib (triple negative pT2 disease)  Olaparib 300 mg p.o. twice daily for 1 year or until disease progression or unacceptable toxicity, whichever occurs  1st  May be taken with or without food  Monitor for myelodysplastic syndrome/acute myeloid leukemia; pneumonitis; nausea, fatigue, cytopenias, etc.      -Consider adjuvant bisphosphonate therapy for risk reduction of distant metastasis for 3-5 years in postmenopausal patients (natural or induced) with high risk node-negative or node positive tumors  -postmenopausal per labs 04/28/2022  -10/05/2022: Dental clearance obtained  -adjuvant Zometa started 11/01/2022  >>>  Continue Zometa 4 mg IV every 6 months x3-5 years (until 11/2027)-Scheduled for 11/2023     Left mastectomy surgery date pending   Will benefit from risk reduction BSO; surgery date pending  Family counseling deferred to      Continue breast cancer surveillance  -History and physical exam 1-4 times per year for 5 years (04/2022-04/2027), then annually;  -Mammogram every 12 months, with no clear advantage to shorter interval imaging;  -Educate, monitor, and referred for lymphedema management  -In the absence of clinical signs or symptoms suggestive of recurrent disease, there is no indication for laboratory or imaging studies for metastasis screening;  -Active lifestyle, healthy diet, limited alcohol intake, and achieving and maintaining an ideal body weight (20-25 BMI) may lead to optimal breast cancer outcomes.  >>>  -S/P Bilateral mastectomy Therefore, no role of surveillance mammography        Follow up with NP in 3 months with lab work prior to   Next Zometa infusion due in 6 months (11/2023)  Continue Olaparib-no refills needed     Above discussed at length with the patient.  All questions answered.  Discussed the rationale and potential side effects of olaparib, and gave her educational materials from [employer].  -----------------  Discussion:  Olaparib:   Dose: 300 mg p.o. twice daily with or without food.    For moderate renal impairment (creatinine clearance 31-50 mL/min), reduced dose to 200 mg twice daily.   For adverse reactions,  consider dose interruption or dose reduction for recommendations.      Warnings and precautions:   1.  Myelodysplastic syndrome/acute myeloid leukemia in less than 1.5% of patients;   2.  Pneumonitis occurs in less than 1% of patients;   3.  Embryo fetal toxicity      Adverse reactions:   Most common adverse reactions (greater than or equal 20%) were anemia, nausea, fatigue, vomiting, neutropenia, leukopenia, nasopharyngitis/URI/influenza, respiratory tract infection, diarrhea, arthralgia/myalgia/dysgeusia, headache, dyspepsia, decreased appetite, constipation and stomatitis.   Most common laboratory abnormalities (in 25% or greater) were anemia, increase in MCV, decrease in lymphocytes, decrease in leukocytes, decrease in absolute neutrophil count, increasing serum creatinine, and decrease in platelets.

## 2023-05-01 ENCOUNTER — INFUSION (OUTPATIENT)
Dept: INFUSION THERAPY | Facility: HOSPITAL | Age: 63
End: 2023-05-01
Attending: INTERNAL MEDICINE
Payer: MEDICAID

## 2023-05-01 ENCOUNTER — OFFICE VISIT (OUTPATIENT)
Dept: HEMATOLOGY/ONCOLOGY | Facility: CLINIC | Age: 63
End: 2023-05-01
Payer: MEDICAID

## 2023-05-01 VITALS
RESPIRATION RATE: 18 BRPM | OXYGEN SATURATION: 98 % | HEIGHT: 62 IN | DIASTOLIC BLOOD PRESSURE: 83 MMHG | HEART RATE: 78 BPM | WEIGHT: 132.19 LBS | TEMPERATURE: 98 F | SYSTOLIC BLOOD PRESSURE: 127 MMHG | BODY MASS INDEX: 24.33 KG/M2

## 2023-05-01 DIAGNOSIS — C50.919 TRIPLE NEGATIVE MALIGNANT NEOPLASM OF BREAST: Primary | ICD-10-CM

## 2023-05-01 DIAGNOSIS — Z51.11 ENCOUNTER FOR CHEMOTHERAPY MANAGEMENT: ICD-10-CM

## 2023-05-01 DIAGNOSIS — C50.911 INFILTRATING DUCTAL CARCINOMA OF RIGHT BREAST: Primary | ICD-10-CM

## 2023-05-01 PROCEDURE — 25000003 PHARM REV CODE 250: Performed by: INTERNAL MEDICINE

## 2023-05-01 PROCEDURE — 99213 OFFICE O/P EST LOW 20 MIN: CPT | Mod: PBBFAC,25

## 2023-05-01 PROCEDURE — 63600175 PHARM REV CODE 636 W HCPCS: Performed by: INTERNAL MEDICINE

## 2023-05-01 PROCEDURE — 1160F RVW MEDS BY RX/DR IN RCRD: CPT | Mod: CPTII,,,

## 2023-05-01 PROCEDURE — 96365 THER/PROPH/DIAG IV INF INIT: CPT

## 2023-05-01 PROCEDURE — 3008F PR BODY MASS INDEX (BMI) DOCUMENTED: ICD-10-PCS | Mod: CPTII,,,

## 2023-05-01 PROCEDURE — 3079F PR MOST RECENT DIASTOLIC BLOOD PRESSURE 80-89 MM HG: ICD-10-PCS | Mod: CPTII,,,

## 2023-05-01 PROCEDURE — 99214 PR OFFICE/OUTPT VISIT, EST, LEVL IV, 30-39 MIN: ICD-10-PCS | Mod: S$PBB,,,

## 2023-05-01 PROCEDURE — 1159F PR MEDICATION LIST DOCUMENTED IN MEDICAL RECORD: ICD-10-PCS | Mod: CPTII,,,

## 2023-05-01 PROCEDURE — 3074F SYST BP LT 130 MM HG: CPT | Mod: CPTII,,,

## 2023-05-01 PROCEDURE — 1160F PR REVIEW ALL MEDS BY PRESCRIBER/CLIN PHARMACIST DOCUMENTED: ICD-10-PCS | Mod: CPTII,,,

## 2023-05-01 PROCEDURE — 3079F DIAST BP 80-89 MM HG: CPT | Mod: CPTII,,,

## 2023-05-01 PROCEDURE — 99214 OFFICE O/P EST MOD 30 MIN: CPT | Mod: S$PBB,,,

## 2023-05-01 PROCEDURE — 3074F PR MOST RECENT SYSTOLIC BLOOD PRESSURE < 130 MM HG: ICD-10-PCS | Mod: CPTII,,,

## 2023-05-01 PROCEDURE — 3008F BODY MASS INDEX DOCD: CPT | Mod: CPTII,,,

## 2023-05-01 PROCEDURE — 1159F MED LIST DOCD IN RCRD: CPT | Mod: CPTII,,,

## 2023-05-01 RX ORDER — SODIUM CHLORIDE 0.9 % (FLUSH) 0.9 %
10 SYRINGE (ML) INJECTION
Status: CANCELLED | OUTPATIENT
Start: 2023-10-02

## 2023-05-01 RX ORDER — SODIUM CHLORIDE 0.9 % (FLUSH) 0.9 %
10 SYRINGE (ML) INJECTION
Status: DISCONTINUED | OUTPATIENT
Start: 2023-05-01 | End: 2023-05-01 | Stop reason: HOSPADM

## 2023-05-01 RX ORDER — HEPARIN 100 UNIT/ML
500 SYRINGE INTRAVENOUS
Status: DISCONTINUED | OUTPATIENT
Start: 2023-05-01 | End: 2023-05-01 | Stop reason: HOSPADM

## 2023-05-01 RX ORDER — HEPARIN 100 UNIT/ML
500 SYRINGE INTRAVENOUS
Status: CANCELLED | OUTPATIENT
Start: 2023-10-02

## 2023-05-01 RX ADMIN — SODIUM CHLORIDE: 9 INJECTION, SOLUTION INTRAVENOUS at 03:05

## 2023-05-01 RX ADMIN — ZOLEDRONIC ACID 4 MG: 4 INJECTION, SOLUTION, CONCENTRATE INTRAVENOUS at 02:05

## 2023-05-01 NOTE — NURSING
1437  Pt did labs, saw provider, and is here for  zometa infusion q 6 mon.  Denies any pain or complaint.  Pt accomp by her daughter.

## 2023-06-21 ENCOUNTER — PATIENT MESSAGE (OUTPATIENT)
Dept: ADMINISTRATIVE | Facility: HOSPITAL | Age: 63
End: 2023-06-21
Payer: MEDICAID

## 2023-06-29 ENCOUNTER — OFFICE VISIT (OUTPATIENT)
Dept: SURGERY | Facility: CLINIC | Age: 63
End: 2023-06-29
Payer: MEDICAID

## 2023-06-29 VITALS
OXYGEN SATURATION: 100 % | HEIGHT: 62 IN | TEMPERATURE: 98 F | SYSTOLIC BLOOD PRESSURE: 134 MMHG | HEART RATE: 91 BPM | BODY MASS INDEX: 23.59 KG/M2 | RESPIRATION RATE: 18 BRPM | WEIGHT: 128.19 LBS | DIASTOLIC BLOOD PRESSURE: 91 MMHG

## 2023-06-29 DIAGNOSIS — D05.10 INTRADUCTAL CARCINOMA: Primary | ICD-10-CM

## 2023-06-29 PROCEDURE — 3008F PR BODY MASS INDEX (BMI) DOCUMENTED: ICD-10-PCS | Mod: CPTII,,, | Performed by: STUDENT IN AN ORGANIZED HEALTH CARE EDUCATION/TRAINING PROGRAM

## 2023-06-29 PROCEDURE — 3008F BODY MASS INDEX DOCD: CPT | Mod: CPTII,,, | Performed by: STUDENT IN AN ORGANIZED HEALTH CARE EDUCATION/TRAINING PROGRAM

## 2023-06-29 PROCEDURE — 3080F DIAST BP >= 90 MM HG: CPT | Mod: CPTII,,, | Performed by: STUDENT IN AN ORGANIZED HEALTH CARE EDUCATION/TRAINING PROGRAM

## 2023-06-29 PROCEDURE — 1159F PR MEDICATION LIST DOCUMENTED IN MEDICAL RECORD: ICD-10-PCS | Mod: CPTII,,, | Performed by: STUDENT IN AN ORGANIZED HEALTH CARE EDUCATION/TRAINING PROGRAM

## 2023-06-29 PROCEDURE — 1159F MED LIST DOCD IN RCRD: CPT | Mod: CPTII,,, | Performed by: STUDENT IN AN ORGANIZED HEALTH CARE EDUCATION/TRAINING PROGRAM

## 2023-06-29 PROCEDURE — 3080F PR MOST RECENT DIASTOLIC BLOOD PRESSURE >= 90 MM HG: ICD-10-PCS | Mod: CPTII,,, | Performed by: STUDENT IN AN ORGANIZED HEALTH CARE EDUCATION/TRAINING PROGRAM

## 2023-06-29 PROCEDURE — 3075F SYST BP GE 130 - 139MM HG: CPT | Mod: CPTII,,, | Performed by: STUDENT IN AN ORGANIZED HEALTH CARE EDUCATION/TRAINING PROGRAM

## 2023-06-29 PROCEDURE — 1160F RVW MEDS BY RX/DR IN RCRD: CPT | Mod: CPTII,,, | Performed by: STUDENT IN AN ORGANIZED HEALTH CARE EDUCATION/TRAINING PROGRAM

## 2023-06-29 PROCEDURE — 1160F PR REVIEW ALL MEDS BY PRESCRIBER/CLIN PHARMACIST DOCUMENTED: ICD-10-PCS | Mod: CPTII,,, | Performed by: STUDENT IN AN ORGANIZED HEALTH CARE EDUCATION/TRAINING PROGRAM

## 2023-06-29 PROCEDURE — 99214 OFFICE O/P EST MOD 30 MIN: CPT | Mod: PBBFAC

## 2023-06-29 PROCEDURE — 99213 OFFICE O/P EST LOW 20 MIN: CPT | Mod: S$PBB,,, | Performed by: STUDENT IN AN ORGANIZED HEALTH CARE EDUCATION/TRAINING PROGRAM

## 2023-06-29 PROCEDURE — 3075F PR MOST RECENT SYSTOLIC BLOOD PRESS GE 130-139MM HG: ICD-10-PCS | Mod: CPTII,,, | Performed by: STUDENT IN AN ORGANIZED HEALTH CARE EDUCATION/TRAINING PROGRAM

## 2023-06-29 PROCEDURE — 99213 PR OFFICE/OUTPT VISIT, EST, LEVL III, 20-29 MIN: ICD-10-PCS | Mod: S$PBB,,, | Performed by: STUDENT IN AN ORGANIZED HEALTH CARE EDUCATION/TRAINING PROGRAM

## 2023-06-29 NOTE — PROGRESS NOTES
Pt seen by Dr. Casillas; Pt instructed to return to clinic in 6 months; Discharge paperwork given w/pt verbalizing understanding     Non-Graft Cartilage Fenestration Text: The cartilage was fenestrated with a 2mm punch biopsy to help facilitate healing.

## 2023-07-17 ENCOUNTER — OFFICE VISIT (OUTPATIENT)
Dept: FAMILY MEDICINE | Facility: CLINIC | Age: 63
End: 2023-07-17
Payer: MEDICAID

## 2023-07-17 VITALS
HEART RATE: 71 BPM | RESPIRATION RATE: 18 BRPM | HEIGHT: 62 IN | WEIGHT: 135 LBS | SYSTOLIC BLOOD PRESSURE: 128 MMHG | BODY MASS INDEX: 24.84 KG/M2 | TEMPERATURE: 99 F | DIASTOLIC BLOOD PRESSURE: 70 MMHG

## 2023-07-17 DIAGNOSIS — M25.511 CHRONIC RIGHT SHOULDER PAIN: ICD-10-CM

## 2023-07-17 DIAGNOSIS — Z13.820 ENCOUNTER FOR SCREENING FOR OSTEOPOROSIS: ICD-10-CM

## 2023-07-17 DIAGNOSIS — Z12.2 ENCOUNTER FOR SCREENING FOR LUNG CANCER: ICD-10-CM

## 2023-07-17 DIAGNOSIS — Z79.83 LONG-TERM CURRENT USE OF BISPHOSPHONATE: ICD-10-CM

## 2023-07-17 DIAGNOSIS — Z23 NEED FOR VACCINATION: ICD-10-CM

## 2023-07-17 DIAGNOSIS — K59.00 CONSTIPATION, UNSPECIFIED CONSTIPATION TYPE: ICD-10-CM

## 2023-07-17 DIAGNOSIS — G89.29 CHRONIC RIGHT SHOULDER PAIN: ICD-10-CM

## 2023-07-17 DIAGNOSIS — I10 PRIMARY HYPERTENSION: Primary | ICD-10-CM

## 2023-07-17 PROBLEM — Z72.0 TOBACCO USER: Status: RESOLVED | Noted: 2022-04-23 | Resolved: 2023-07-17

## 2023-07-17 PROBLEM — E55.9 VITAMIN D DEFICIENCY: Status: RESOLVED | Noted: 2022-05-01 | Resolved: 2023-07-17

## 2023-07-17 PROCEDURE — 90750 HZV VACC RECOMBINANT IM: CPT | Mod: PBBFAC

## 2023-07-17 PROCEDURE — 99213 OFFICE O/P EST LOW 20 MIN: CPT | Mod: PBBFAC

## 2023-07-17 PROCEDURE — 90714 TD VACC NO PRESV 7 YRS+ IM: CPT | Mod: PBBFAC

## 2023-07-17 PROCEDURE — 90471 IMMUNIZATION ADMIN: CPT | Mod: PBBFAC

## 2023-07-17 RX ORDER — BIOTIN 5 MG
1 CAPSULE ORAL ONCE
Qty: 90 CAPSULE | Refills: 1 | Status: SHIPPED | OUTPATIENT
Start: 2023-07-17 | End: 2023-11-20 | Stop reason: SDUPTHER

## 2023-07-17 RX ORDER — HYDROCHLOROTHIAZIDE 12.5 MG/1
12.5 CAPSULE ORAL DAILY
Qty: 90 CAPSULE | Refills: 1 | Status: SHIPPED | OUTPATIENT
Start: 2023-07-17 | End: 2023-11-20 | Stop reason: SDUPTHER

## 2023-07-17 RX ORDER — AMLODIPINE BESYLATE 10 MG/1
10 TABLET ORAL DAILY
Qty: 90 TABLET | Refills: 1 | Status: SHIPPED | OUTPATIENT
Start: 2023-07-17 | End: 2023-11-20 | Stop reason: SDUPTHER

## 2023-07-17 RX ORDER — LIDOCAINE 50 MG/G
1 PATCH TOPICAL DAILY
Qty: 30 PATCH | Refills: 2 | Status: SHIPPED | OUTPATIENT
Start: 2023-07-17 | End: 2023-11-20 | Stop reason: SDUPTHER

## 2023-07-17 RX ORDER — POLYETHYLENE GLYCOL 3350 17 G/17G
17 POWDER, FOR SOLUTION ORAL DAILY
Qty: 850 G | Refills: 2 | Status: SHIPPED | OUTPATIENT
Start: 2023-07-17 | End: 2023-11-20 | Stop reason: SDUPTHER

## 2023-07-17 NOTE — PROGRESS NOTES
"[facility] OFFICE VISIT NOTE  Cookie Ugalde  [id number]  07/17/2023    Chief Complaint   Patient presents with    Follow-up    Multiple medical conditions    Arthritis    medication refills       Cookie Ugalde is a 62 y.o. female  presenting to [facility] for hypertension follow up. No acute issues today.     Chronic Issues:   Constipation: resolved with Miralax everyday. Has BM every day. Stool is soft, normal. Uses Dulcolax PRN. Denies abdominal pain or blood in stool.   Hypertension: amlodipine 10, HCTZ 12.5. Well controlled. Denies headache, chest pain, dyspnea, palpitations, decreased urine.   Breast CA: see Maintenance  ?Arthritis right shoulder: controlled with lido patch and massager. XR thoracic spine was ordered 1/2023 but never done    FH: father and 2 brothers had prostate CA.     Maintenance:  Breast CA: Breast clinic q6m, last seen 6/29/23. BRCA2+ status, stage IIa triple negative Breast cancer with Bilateral mastectomy 12/14/22  2/2 IDC (on Olaparib 300 mg bid)  Pap:  TLH/risk reducing BSO/cystoscopy on 2/7/23 in the setting of BRCA2+ status  Colonoscopy: scheduled for 8/2023 In [place]. Never had colonoscopy. No FH of colon CA.   Lung CA: quit 1 year ago, history of 30 pack year smoking. Due.  DEXA: used to drink EtOH 1/2 pint whiskey for about 30 year on weekends, quit 1 year ago. No FH or personal hx of fractures. Started Zometa 11/2022, q6m with Hematology for risk reduction of distant mets.   Vaccine: needs Shingles, Td    Objective:   Blood pressure 128/70, pulse 71, temperature 98.6 °F (37 °C), temperature source Oral, resp. rate 18, height 5' 2" (1.575 m), weight 61.2 kg (135 lb).   Physical Exam  General: appears well, in no acute distress   Eye: no scleral icterus   HENT: MMM, oropharynx without erythema/exudate   Neck: supple, no lymphadenopathy, no carotid bruits   Respiratory: clear to auscultation bilaterally, nonlabored respirations   Cardiovascular: regular rate and rhythm without " murmurs or gallops, no edema in bilateral lower extremities   Gastrointestinal: soft, non-tender, non-distended, bowel sounds present   Genitourinary: no suprapubic tenderness   Musculoskeletal: no gross deformities observed. S/p bilateral mastectomy, well healed scars over bilateral chest without redness or discharge. TTP over medial border of right scapula. UE full aROM, sensation intact. Scapula symmetrical.   Integumentary: no acute rashes or skin lesions observed    Neuro: No focal lesions observed     Current Medications:   Current Outpatient Medications   Medication Sig Dispense Refill    amLODIPine (NORVASC) 10 MG tablet Take 1 tablet (10 mg total) by mouth once daily. 90 tablet 1    calcium carbonate-vitamin D3 (CALCIUM 600 WITH VITAMIN D3) 600 mg-12.5 mcg (500 unit) Cap Take 1 capsule by mouth once. for 1 dose 90 capsule 1    hydroCHLOROthiazide (MICROZIDE) 12.5 mg capsule Take 1 capsule (12.5 mg total) by mouth once daily. 90 capsule 1    LIDOcaine (LIDODERM) 5 % Place 1 patch onto the skin once daily. Remove & Discard patch within 12 hours or as directed by MD 30 patch 2    olaparib (LYNPARZA) tablet 150 mg Take 2 tablets (300 mg total) by mouth 2 (two) times daily. 120 tablet 6    polyethylene glycol (MIRALAX) 17 gram/dose powder Take 17 g by mouth once daily. 850 g 2     No current facility-administered medications for this visit.       Assessment:   1. Primary hypertension    2. Chronic right shoulder pain    3. Constipation, unspecified constipation type    4. Need for vaccination    5. Encounter for screening for lung cancer    6. Encounter for screening for osteoporosis    7. Long-term current use of bisphosphonate      Plan:  - refilled all meds  - Pt needs DEXA, LDCT; ordered placed. Pt is already on bisphosphonate (Zometa) with Hematology. Will place pt on calcium-Vitamin D.   - Shingrix dose 1 and Td today  - colonoscopy already scheduled   - ?Arthritis right shoulder: controlled with lido  patch and massager. XR thoracic spine was ordered 1/2023 but never done; may follow up with imaging in future if patient has worsened pain. Hold off for now.     Orders Placed This Encounter    DXA Bone Density Axial Skeleton 1 or more sites    CT Chest Lung Screening Low Dose    (In Office Administered) Zoster Recombinant Vaccine    (In Office Administered) Td Vaccine - Preservative Free    amLODIPine (NORVASC) 10 MG tablet    hydroCHLOROthiazide (MICROZIDE) 12.5 mg capsule    polyethylene glycol (MIRALAX) 17 gram/dose powder    LIDOcaine (LIDODERM) 5 %    calcium carbonate-vitamin D3 (CALCIUM 600 WITH VITAMIN D3) 600 mg-12.5 mcg (500 unit) Cap       Return to clinic in 4 months for 2nd dose of Shingrix and HTN follow up, or sooner if needed.     Giovana Sheets  [facility] Resident

## 2023-07-17 NOTE — PROGRESS NOTES
Discussed with resident at time of encounter 07-17-23.  Resident's note reviewed 07-17-23.  Agree with assessment; plan of care appropriate.  Follow-up ordered studies.  Professional services provided in an outpatient primary care center affiliated with a [facility] institution.

## 2023-07-31 ENCOUNTER — HOSPITAL ENCOUNTER (OUTPATIENT)
Dept: RADIOLOGY | Facility: HOSPITAL | Age: 63
Discharge: HOME | End: 2023-07-31
Payer: MEDICAID

## 2023-07-31 DIAGNOSIS — Z12.2 ENCOUNTER FOR SCREENING FOR LUNG CANCER: ICD-10-CM

## 2023-07-31 PROCEDURE — 71271 CT THORAX LUNG CANCER SCR C-: CPT | Mod: TC

## 2023-08-01 ENCOUNTER — OFFICE VISIT (OUTPATIENT)
Dept: HEMATOLOGY/ONCOLOGY | Facility: CLINIC | Age: 63
End: 2023-08-01
Payer: MEDICAID

## 2023-08-01 VITALS
SYSTOLIC BLOOD PRESSURE: 110 MMHG | OXYGEN SATURATION: 100 % | DIASTOLIC BLOOD PRESSURE: 76 MMHG | RESPIRATION RATE: 18 BRPM | HEART RATE: 88 BPM | BODY MASS INDEX: 24.04 KG/M2 | TEMPERATURE: 97 F | HEIGHT: 62 IN | WEIGHT: 130.63 LBS

## 2023-08-01 DIAGNOSIS — C50.919 TRIPLE NEGATIVE MALIGNANT NEOPLASM OF BREAST: ICD-10-CM

## 2023-08-01 DIAGNOSIS — C50.919 TRIPLE NEGATIVE MALIGNANT NEOPLASM OF BREAST: Primary | ICD-10-CM

## 2023-08-01 DIAGNOSIS — C50.911 INFILTRATING DUCTAL CARCINOMA OF RIGHT BREAST: Primary | ICD-10-CM

## 2023-08-01 LAB
ALBUMIN SERPL-MCNC: 4 G/DL (ref 3.4–4.8)
ALBUMIN/GLOB SERPL: 1.1 RATIO (ref 1.1–2)
ALP SERPL-CCNC: 62 UNIT/L (ref 40–150)
ALT SERPL-CCNC: 8 UNIT/L (ref 0–55)
AST SERPL-CCNC: 15 UNIT/L (ref 5–34)
BASOPHILS # BLD AUTO: 0.05 X10(3)/MCL
BASOPHILS NFR BLD AUTO: 0.8 %
BILIRUBIN DIRECT+TOT PNL SERPL-MCNC: 0.8 MG/DL
BUN SERPL-MCNC: 12.3 MG/DL (ref 9.8–20.1)
CALCIUM SERPL-MCNC: 9.5 MG/DL (ref 8.4–10.2)
CHLORIDE SERPL-SCNC: 106 MMOL/L (ref 98–107)
CO2 SERPL-SCNC: 25 MMOL/L (ref 23–31)
CREAT SERPL-MCNC: 1 MG/DL (ref 0.55–1.02)
EOSINOPHIL # BLD AUTO: 0.06 X10(3)/MCL (ref 0–0.9)
EOSINOPHIL NFR BLD AUTO: 0.9 %
ERYTHROCYTE [DISTWIDTH] IN BLOOD BY AUTOMATED COUNT: 15 % (ref 11.5–17)
GFR SERPLBLD CREATININE-BSD FMLA CKD-EPI: >60 MLS/MIN/1.73/M2
GLOBULIN SER-MCNC: 3.6 GM/DL (ref 2.4–3.5)
GLUCOSE SERPL-MCNC: 104 MG/DL (ref 82–115)
HCT VFR BLD AUTO: 37.3 % (ref 37–47)
HGB BLD-MCNC: 12.1 G/DL (ref 12–16)
IMM GRANULOCYTES # BLD AUTO: 0.03 X10(3)/MCL (ref 0–0.04)
IMM GRANULOCYTES NFR BLD AUTO: 0.5 %
LYMPHOCYTES # BLD AUTO: 1.63 X10(3)/MCL (ref 0.6–4.6)
LYMPHOCYTES NFR BLD AUTO: 24.7 %
MAGNESIUM SERPL-MCNC: 2.3 MG/DL (ref 1.6–2.6)
MCH RBC QN AUTO: 31.4 PG (ref 27–31)
MCHC RBC AUTO-ENTMCNC: 32.4 G/DL (ref 33–36)
MCV RBC AUTO: 96.9 FL (ref 80–94)
MONOCYTES # BLD AUTO: 0.44 X10(3)/MCL (ref 0.1–1.3)
MONOCYTES NFR BLD AUTO: 6.7 %
NEUTROPHILS # BLD AUTO: 4.39 X10(3)/MCL (ref 2.1–9.2)
NEUTROPHILS NFR BLD AUTO: 66.4 %
NRBC BLD AUTO-RTO: 0 %
PLATELET # BLD AUTO: 340 X10(3)/MCL (ref 130–400)
PMV BLD AUTO: 9.5 FL (ref 7.4–10.4)
POTASSIUM SERPL-SCNC: 4.2 MMOL/L (ref 3.5–5.1)
PROT SERPL-MCNC: 7.6 GM/DL (ref 5.8–7.6)
RBC # BLD AUTO: 3.85 X10(6)/MCL (ref 4.2–5.4)
SODIUM SERPL-SCNC: 140 MMOL/L (ref 136–145)
WBC # SPEC AUTO: 6.6 X10(3)/MCL (ref 4.5–11.5)

## 2023-08-01 PROCEDURE — 99213 OFFICE O/P EST LOW 20 MIN: CPT | Mod: PBBFAC | Performed by: NURSE PRACTITIONER

## 2023-08-01 PROCEDURE — 3008F BODY MASS INDEX DOCD: CPT | Mod: CPTII,,, | Performed by: NURSE PRACTITIONER

## 2023-08-01 PROCEDURE — 3044F HG A1C LEVEL LT 7.0%: CPT | Mod: CPTII,,, | Performed by: NURSE PRACTITIONER

## 2023-08-01 PROCEDURE — 1159F MED LIST DOCD IN RCRD: CPT | Mod: CPTII,,, | Performed by: NURSE PRACTITIONER

## 2023-08-01 PROCEDURE — 1160F PR REVIEW ALL MEDS BY PRESCRIBER/CLIN PHARMACIST DOCUMENTED: ICD-10-PCS | Mod: CPTII,,, | Performed by: NURSE PRACTITIONER

## 2023-08-01 PROCEDURE — 99214 OFFICE O/P EST MOD 30 MIN: CPT | Mod: S$PBB,,, | Performed by: NURSE PRACTITIONER

## 2023-08-01 PROCEDURE — 3078F PR MOST RECENT DIASTOLIC BLOOD PRESSURE < 80 MM HG: ICD-10-PCS | Mod: CPTII,,, | Performed by: NURSE PRACTITIONER

## 2023-08-01 PROCEDURE — 3008F PR BODY MASS INDEX (BMI) DOCUMENTED: ICD-10-PCS | Mod: CPTII,,, | Performed by: NURSE PRACTITIONER

## 2023-08-01 PROCEDURE — 3044F PR MOST RECENT HEMOGLOBIN A1C LEVEL <7.0%: ICD-10-PCS | Mod: CPTII,,, | Performed by: NURSE PRACTITIONER

## 2023-08-01 PROCEDURE — 3074F SYST BP LT 130 MM HG: CPT | Mod: CPTII,,, | Performed by: NURSE PRACTITIONER

## 2023-08-01 PROCEDURE — 1159F PR MEDICATION LIST DOCUMENTED IN MEDICAL RECORD: ICD-10-PCS | Mod: CPTII,,, | Performed by: NURSE PRACTITIONER

## 2023-08-01 PROCEDURE — 3078F DIAST BP <80 MM HG: CPT | Mod: CPTII,,, | Performed by: NURSE PRACTITIONER

## 2023-08-01 PROCEDURE — 99214 PR OFFICE/OUTPT VISIT, EST, LEVL IV, 30-39 MIN: ICD-10-PCS | Mod: S$PBB,,, | Performed by: NURSE PRACTITIONER

## 2023-08-01 PROCEDURE — 1160F RVW MEDS BY RX/DR IN RCRD: CPT | Mod: CPTII,,, | Performed by: NURSE PRACTITIONER

## 2023-08-01 PROCEDURE — 3074F PR MOST RECENT SYSTOLIC BLOOD PRESSURE < 130 MM HG: ICD-10-PCS | Mod: CPTII,,, | Performed by: NURSE PRACTITIONER

## 2023-08-01 PROCEDURE — 36415 COLL VENOUS BLD VENIPUNCTURE: CPT | Performed by: NURSE PRACTITIONER

## 2023-08-01 NOTE — PROGRESS NOTES
Problem List:   IDC Right Breast, Triple Negative; follow up     Current Treatment:  Olaparib 300 mg p.o. twice daily for 1 year or until disease progression or unacceptable toxicity, whichever occurs 1st  Start date pending 2022    Zometa IV Q 6 months   Started 2022    Treatment History:  -Mediport placed on 2022  -AC x4 cycles (2022-2022)  -DD Taxol Q2 weeks x4 cycles (2022-2022)   -mediport removal 2023      Past medical history: Tobacco abuse. Hypertension. Arthritis of right shoulder.  Procedure/surgical history:  Umbilical hernia repair .  [facility] BSO 2022-S/p Left mastectomy and mediport removed   Social history: . Lives in [place], [place]. Has 4 children. Works as a  in a [employer]. Has been smoking 5 cigarettes daily for 15 years. Drinks half a pint of liquor over the weekend. No illicit drugs  Menstrual and OB/GYN history: Menarche, age 14. Menopause, late 40s. First child at age 26 years. No abortions or miscarriages. Used birth control pills for 8 years. Did not use hormone replacement therapy after menopause. Breast-fed 1 child for 7 months.  Family history:  2 brothers experienced prostate cancer in their 50s  Father  from prostate cancer in his 60s-70s  No family history of breast cancer.    History of Present Illness:  62 -year-old female referred from surgery clinic, with newly diagnosed right breast cancer.  She never palpated breast mass. She noticed breast/chest wall pain after extensive muscle use in 2021. At that time, she was diagnosed with musculoskeletal pain. In January, she went back to emergency department when symptoms did not resolve. At that time, physical examination apparently noted irregularity in breast and she was referred for further imaging and biopsy revealing for triple negative breast cancer.     Interval history 2023: Patient presents today alone for scheduled follow up for  breast cancer. She is currently taking Lynparza of which she reports adherence. Patient says she is tolerating well.  She denies any pain, unintentional weight loss, N/V/D/C, SOB lymphedema, or adenopathy.  Lab work reviewed with patient, stable. Discussed follow up appointments with patient. Denies any other issues or concerns at this time.     Review of Systems: Negative as otherwise stated in HPI    Lab Results   Component Value Date    WBC 6.60 08/01/2023    RBC 3.85 (L) 08/01/2023    HGB 12.1 08/01/2023    HCT 37.3 08/01/2023    MCV 96.9 (H) 08/01/2023    MCH 31.4 (H) 08/01/2023    MCHC 32.4 (L) 08/01/2023    RDW 15.0 08/01/2023     08/01/2023    MPV 9.5 08/01/2023     CMP  Sodium Level   Date Value Ref Range Status   08/01/2023 140 136 - 145 mmol/L Final     Potassium Level   Date Value Ref Range Status   08/01/2023 4.2 3.5 - 5.1 mmol/L Final     Carbon Dioxide   Date Value Ref Range Status   08/01/2023 25 23 - 31 mmol/L Final     Blood Urea Nitrogen   Date Value Ref Range Status   08/01/2023 12.3 9.8 - 20.1 mg/dL Final     Creatinine   Date Value Ref Range Status   08/01/2023 1.00 0.55 - 1.02 mg/dL Final     Calcium Level Total   Date Value Ref Range Status   08/01/2023 9.5 8.4 - 10.2 mg/dL Final     Albumin Level   Date Value Ref Range Status   08/01/2023 4.0 3.4 - 4.8 g/dL Final     Bilirubin Total   Date Value Ref Range Status   08/01/2023 0.8 <=1.5 mg/dL Final     Alkaline Phosphatase   Date Value Ref Range Status   08/01/2023 62 40 - 150 unit/L Final     Aspartate Aminotransferase   Date Value Ref Range Status   08/01/2023 15 5 - 34 unit/L Final     Alanine Aminotransferase   Date Value Ref Range Status   08/01/2023 8 0 - 55 unit/L Final     eGFR   Date Value Ref Range Status   08/01/2023 >60 mls/min/1.73/m2 Final     Vitals & Measurements  Vitals:    08/01/23 1330   BP: 110/76   Pulse: 88   Resp: 18   Temp: 97.4 °F (36.3 °C)     Assessment:  #IDC right breast, triple negative:  To summarize:  IDC  right breast, triple negative  Right simple mastectomy 04/11/2022  2.2 cm, grade 3, no LVI, margins negative, 4 lymph nodes negative  T2 pN0 MX, G3, triple negative, AJCC anatomic stage IIA, pathological prognostic stage IIA  Postmenopausal per labs (04/28/2022)  S/P adjuvant ddAC x4, then dose dense Taxol x4 (05/30/2022-09/12/2022)   -genetic testing:  positive for deleterious mutation: BRCA2 p.* ([id number]).   -11/01/2022:  Started Zometa 4 mg IV (every 6 months x3-5 years) (for risk reduction of distant metastasis)  -11/18/2022:  Bilateral screening mammography with tomosynthesis (comparison:  03/18/2022 mammogram): Both breasts benign, BI-RADS 2 (continue annual screening mammography with digital breast tomosynthesis)    Plan:  IDC right breast, triple negative  -postmenopausal (per labs 04/28/2022)     S/P adjuvant ddAC x4, then adjuvant dose dense Taxol x4 (05/30/2022-09/12/2022)     pT2 pN0; S/P Bilateral mastectomy   Does not need adjuvant radiotherapy     Genetic testing + for deleterious BRCA2 mutation  Will benefit from 1 year of adjuvant olaparib (triple negative pT2 disease)  Olaparib 300 mg p.o. twice daily for 1 year or until disease progression or unacceptable toxicity, whichever occurs 1st  May be taken with or without food  Monitor for myelodysplastic syndrome/acute myeloid leukemia; pneumonitis; nausea, fatigue, cytopenias, etc.      -Consider adjuvant bisphosphonate therapy for risk reduction of distant metastasis for 3-5 years in postmenopausal patients (natural or induced) with high risk node-negative or node positive tumors  -postmenopausal per labs 04/28/2022  -10/05/2022: Dental clearance obtained  -adjuvant Zometa started 11/01/2022  Continue Zometa 4 mg IV every 6 months x3-5 years (until 11/2027)     Follow up with NP in 3 months with lab work prior to Zometa infusion  Next Zometa infusion due in 6 months (11/2023)-Scheduled for 11/2/2023  Continue Olaparib-no refills needed        Continue breast cancer surveillance  -History and physical exam 1-4 times per year for 5 years (04/2022-04/2027), then annually;  -Mammogram every 12 months, with no clear advantage to shorter interval imaging;  -Educate, monitor, and referred for lymphedema management  -In the absence of clinical signs or symptoms suggestive of recurrent disease, there is no indication for laboratory or imaging studies for metastasis screening;  -Active lifestyle, healthy diet, limited alcohol intake, and achieving and maintaining an ideal body weight (20-25 BMI) may lead to optimal breast cancer outcomes.  >>>  -S/P Bilateral mastectomy Therefore, no role of surveillance mammography        Above discussed at length with the patient.  All questions answered.  Discussed the rationale and potential side effects of olaparib, and gave her educational materials from [employer] ([employer]).  -----------------  Discussion:  Olaparib:   Dose: 300 mg p.o. twice daily with or without food.    For moderate renal impairment (creatinine clearance 31-50 mL/min), reduced dose to 200 mg twice daily.   For adverse reactions, consider dose interruption or dose reduction for recommendations.      Warnings and precautions:   1.  Myelodysplastic syndrome/acute myeloid leukemia in less than 1.5% of patients;   2.  Pneumonitis occurs in less than 1% of patients;   3.  Embryo fetal toxicity      Adverse reactions:   Most common adverse reactions (greater than or equal 20%) were anemia, nausea, fatigue, vomiting, neutropenia, leukopenia, nasopharyngitis/URI/influenza, respiratory tract infection, diarrhea, arthralgia/myalgia/dysgeusia, headache, dyspepsia, decreased appetite, constipation and stomatitis.   Most common laboratory abnormalities (in 25% or greater) were anemia, increase in MCV, decrease in lymphocytes, decrease in leukocytes, decrease in absolute neutrophil count, increasing serum creatinine, and decrease in platelets.

## 2023-08-02 ENCOUNTER — HOSPITAL ENCOUNTER (OUTPATIENT)
Dept: RADIOLOGY | Facility: HOSPITAL | Age: 63
Discharge: HOME | End: 2023-08-02
Payer: MEDICAID

## 2023-08-02 DIAGNOSIS — Z13.820 ENCOUNTER FOR SCREENING FOR OSTEOPOROSIS: ICD-10-CM

## 2023-08-02 PROCEDURE — 77080 DXA BONE DENSITY AXIAL: CPT | Mod: TC

## 2023-08-03 ENCOUNTER — TELEPHONE (OUTPATIENT)
Dept: FAMILY MEDICINE | Facility: CLINIC | Age: 63
End: 2023-08-03
Payer: MEDICAID

## 2023-08-03 NOTE — TELEPHONE ENCOUNTER
08/03/2023 call pt. About not returning cologard kit ,she states she was notified by the company not to return due to fact she cancer pt. She states has colonscopy nicho.in aug. And her pcp is aware.

## 2023-08-15 ENCOUNTER — DOCUMENTATION ONLY (OUTPATIENT)
Dept: RADIOLOGY | Facility: HOSPITAL | Age: 63
End: 2023-08-15
Payer: MEDICAID

## 2023-08-15 DIAGNOSIS — R91.1 LUNG NODULE: Primary | ICD-10-CM

## 2023-08-15 NOTE — PROGRESS NOTES
Spoke with patient and reviewed lung nodule findings on LDCT scan. Advised patient that a referral to pulmonology is recommended for follow up. Patient agreeable and referral sent. Patient verbalized understanding and has no questions at this time.

## 2023-08-22 DIAGNOSIS — R91.1 SOLITARY PULMONARY NODULE: Primary | ICD-10-CM

## 2023-08-30 LAB — CRC RECOMMENDATION EXT: NORMAL

## 2023-09-12 ENCOUNTER — PATIENT OUTREACH (OUTPATIENT)
Dept: ADMINISTRATIVE | Facility: HOSPITAL | Age: 63
End: 2023-09-12
Payer: MEDICAID

## 2023-09-12 NOTE — PROGRESS NOTES
Population Health Outreach. The following record(s) were received and uploaded for Health Maintenance.    Colonoscopy 8/30/2023    Population Health Chart Review & Patient Outreach Details:     Reason for Outreach Encounter:     []  Non-Compliant Report   []  Payor Report ([employer], PHN, BCBS, MSSP, MCIP, UHC, etc.)   []  Pre-Visit Chart Review     Updates Requested / Reviewed:     []  Care Everywhere    []     []  External Sources ([facility], [facility], DIS, etc.)   [x]  Care Team Updated    Patient Outreach Method:    []  Telephone Outreach Completed   [] Successful   [] Left Voicemail   [] Unable to Contact (wrong number, no voicemail)  []  [facility] Outreach Sent  []  Letter Outreach Mailed  []  Fax Sent for External Records  [x]  External Records Upload    Health Maintenance Topics Addressed and Outreach Outcomes / Actions Taken:        []      Breast Cancer Screening []  Mammo Scheduled      []  External Records Requested     []  Added Reminder to Complete to Upcoming Primary Care Appt Notes     []  Patient Declined     []  Patient Will Call Back to Schedule     []  Patient Will Schedule with External Provider / Order Routed if Applicable             []       Cervical Cancer Screening []  Pap Scheduled      []  External Records Requested     []  Added Reminder to Complete to Upcoming Primary Care Appt Notes     []  Patient Declined     []  Patient Will Call Back to Schedule     []  Patient Will Schedule with External Provider               [x]          Colorectal Cancer Screening []  Colonoscopy Case Request or Referral Placed     []  External Records Requested     []  Added Reminder to Complete to Upcoming Primary Care Appt Notes     []  Patient Declined     []  Patient Will Call Back to Schedule     []  Patient Will Schedule with External Provider     []  Fit Kit Mailed (add the SmartPhrase under additional notes)     []  Reminded Patient to Complete Home Test             []      Diabetic Eye  Exam []  Eye Camera Scheduled or Optometry Referral Placed     []  External Records Requested     []  Added Reminder to Complete to Upcoming Primary Care Appt Notes     []  Patient Declined     []  Patient Will Call Back to Schedule     []  Patient Will Schedule with External Provider             []      Blood Pressure Control []  Primary Care Follow Up Visit Scheduled     []  Remote Blood Pressure Reading Captured     []  Added Reminder to Complete to Upcoming Primary Care Appt Notes     []  Patient Declined     []  Patient Will Call Back / Patient Will Send Portal Message with Reading     []  Patient Will Call Back to Schedule Provider Visit             []       HbA1c & Other Labs []  Lab Appt Scheduled for Due Labs     []  Primary Care Follow Up Visit Scheduled      []  Reminded Patient to Complete Home Test     []  Added Reminder to Complete to Upcoming Primary Care Appt Notes     []  Patient Declined     []  Patient Will Call Back to Schedule     []  Patient Will Schedule with External Provider / Order Routed if Applicable           []    Schedule Primary Care Appt []  Primary Care Appt Scheduled     []  Patient Declined     []  Patient Will Call Back to Schedule     []  Pt Established with External Provider & Updated Care Team             []      Medication Adherence []  Primary Care Appointment Scheduled     []  Added Reminder to Upcoming Primary Care Appt Notes     []  Patient Reminded to  Prescription     []  Patient Declined, Provider Notified if Needed     []  Sent Provider Message to Review and/or Add Exclusion to Problem List             []      Osteoporosis Screening []  DXA Appointment Scheduled     []  External Records Requested     []  Added Reminder to Complete to Upcoming Primary Care Appt Notes     []  Patient Declined     []  Patient Will Call Back to Schedule     []  Patient Will Schedule with External Provider / Order Routed if Applicable     Additional Care Coordinator Notes:          Further Action Needed If Patient Returns Outreach:

## 2023-11-02 ENCOUNTER — INFUSION (OUTPATIENT)
Dept: INFUSION THERAPY | Facility: HOSPITAL | Age: 63
End: 2023-11-02
Attending: INTERNAL MEDICINE
Payer: MEDICAID

## 2023-11-02 ENCOUNTER — OFFICE VISIT (OUTPATIENT)
Dept: HEMATOLOGY/ONCOLOGY | Facility: CLINIC | Age: 63
End: 2023-11-02
Payer: MEDICAID

## 2023-11-02 VITALS
WEIGHT: 128.75 LBS | RESPIRATION RATE: 20 BRPM | TEMPERATURE: 98 F | HEIGHT: 60 IN | OXYGEN SATURATION: 100 % | SYSTOLIC BLOOD PRESSURE: 120 MMHG | HEART RATE: 94 BPM | DIASTOLIC BLOOD PRESSURE: 89 MMHG | BODY MASS INDEX: 25.28 KG/M2

## 2023-11-02 VITALS
RESPIRATION RATE: 20 BRPM | WEIGHT: 128.81 LBS | OXYGEN SATURATION: 100 % | HEIGHT: 60 IN | DIASTOLIC BLOOD PRESSURE: 81 MMHG | TEMPERATURE: 98 F | HEART RATE: 102 BPM | SYSTOLIC BLOOD PRESSURE: 130 MMHG | BODY MASS INDEX: 25.29 KG/M2

## 2023-11-02 DIAGNOSIS — Z15.09 BRCA2 GENE MUTATION POSITIVE: Primary | ICD-10-CM

## 2023-11-02 DIAGNOSIS — C50.911 INFILTRATING DUCTAL CARCINOMA OF RIGHT BREAST: ICD-10-CM

## 2023-11-02 DIAGNOSIS — C50.919 TRIPLE NEGATIVE MALIGNANT NEOPLASM OF BREAST: ICD-10-CM

## 2023-11-02 DIAGNOSIS — Z15.01 BRCA2 GENE MUTATION POSITIVE: Primary | ICD-10-CM

## 2023-11-02 DIAGNOSIS — C50.911 INFILTRATING DUCTAL CARCINOMA OF RIGHT BREAST: Primary | ICD-10-CM

## 2023-11-02 PROCEDURE — 25000003 PHARM REV CODE 250: Performed by: INTERNAL MEDICINE

## 2023-11-02 PROCEDURE — 1159F MED LIST DOCD IN RCRD: CPT | Mod: CPTII,,, | Performed by: NURSE PRACTITIONER

## 2023-11-02 PROCEDURE — 3044F PR MOST RECENT HEMOGLOBIN A1C LEVEL <7.0%: ICD-10-PCS | Mod: CPTII,,, | Performed by: NURSE PRACTITIONER

## 2023-11-02 PROCEDURE — 3075F PR MOST RECENT SYSTOLIC BLOOD PRESS GE 130-139MM HG: ICD-10-PCS | Mod: CPTII,,, | Performed by: NURSE PRACTITIONER

## 2023-11-02 PROCEDURE — 99214 OFFICE O/P EST MOD 30 MIN: CPT | Mod: S$PBB,,, | Performed by: NURSE PRACTITIONER

## 2023-11-02 PROCEDURE — 63600175 PHARM REV CODE 636 W HCPCS: Performed by: INTERNAL MEDICINE

## 2023-11-02 PROCEDURE — 3044F HG A1C LEVEL LT 7.0%: CPT | Mod: CPTII,,, | Performed by: NURSE PRACTITIONER

## 2023-11-02 PROCEDURE — 3079F DIAST BP 80-89 MM HG: CPT | Mod: CPTII,,, | Performed by: NURSE PRACTITIONER

## 2023-11-02 PROCEDURE — 1159F PR MEDICATION LIST DOCUMENTED IN MEDICAL RECORD: ICD-10-PCS | Mod: CPTII,,, | Performed by: NURSE PRACTITIONER

## 2023-11-02 PROCEDURE — 99214 OFFICE O/P EST MOD 30 MIN: CPT | Mod: PBBFAC,25 | Performed by: NURSE PRACTITIONER

## 2023-11-02 PROCEDURE — 96365 THER/PROPH/DIAG IV INF INIT: CPT

## 2023-11-02 PROCEDURE — 1160F PR REVIEW ALL MEDS BY PRESCRIBER/CLIN PHARMACIST DOCUMENTED: ICD-10-PCS | Mod: CPTII,,, | Performed by: NURSE PRACTITIONER

## 2023-11-02 PROCEDURE — 3075F SYST BP GE 130 - 139MM HG: CPT | Mod: CPTII,,, | Performed by: NURSE PRACTITIONER

## 2023-11-02 PROCEDURE — 3008F PR BODY MASS INDEX (BMI) DOCUMENTED: ICD-10-PCS | Mod: CPTII,,, | Performed by: NURSE PRACTITIONER

## 2023-11-02 PROCEDURE — A4216 STERILE WATER/SALINE, 10 ML: HCPCS | Performed by: INTERNAL MEDICINE

## 2023-11-02 PROCEDURE — 3008F BODY MASS INDEX DOCD: CPT | Mod: CPTII,,, | Performed by: NURSE PRACTITIONER

## 2023-11-02 PROCEDURE — 3079F PR MOST RECENT DIASTOLIC BLOOD PRESSURE 80-89 MM HG: ICD-10-PCS | Mod: CPTII,,, | Performed by: NURSE PRACTITIONER

## 2023-11-02 PROCEDURE — 1160F RVW MEDS BY RX/DR IN RCRD: CPT | Mod: CPTII,,, | Performed by: NURSE PRACTITIONER

## 2023-11-02 PROCEDURE — 99214 PR OFFICE/OUTPT VISIT, EST, LEVL IV, 30-39 MIN: ICD-10-PCS | Mod: S$PBB,,, | Performed by: NURSE PRACTITIONER

## 2023-11-02 RX ORDER — ZOLEDRONIC ACID 0.04 MG/ML
4 INJECTION, SOLUTION INTRAVENOUS
OUTPATIENT
Start: 2024-04-04

## 2023-11-02 RX ORDER — HEPARIN 100 UNIT/ML
500 SYRINGE INTRAVENOUS
Status: DISCONTINUED | OUTPATIENT
Start: 2023-11-02 | End: 2023-11-02 | Stop reason: HOSPADM

## 2023-11-02 RX ORDER — SODIUM CHLORIDE 0.9 % (FLUSH) 0.9 %
10 SYRINGE (ML) INJECTION
Status: DISCONTINUED | OUTPATIENT
Start: 2023-11-02 | End: 2023-11-02 | Stop reason: HOSPADM

## 2023-11-02 RX ORDER — HEPARIN 100 UNIT/ML
500 SYRINGE INTRAVENOUS
OUTPATIENT
Start: 2024-04-04

## 2023-11-02 RX ORDER — SODIUM CHLORIDE 0.9 % (FLUSH) 0.9 %
10 SYRINGE (ML) INJECTION
OUTPATIENT
Start: 2024-04-04

## 2023-11-02 RX ADMIN — ZOLEDRONIC ACID 3.3 MG: 4 INJECTION, SOLUTION, CONCENTRATE INTRAVENOUS at 12:11

## 2023-11-02 RX ADMIN — Medication 10 ML: at 12:11

## 2023-11-02 NOTE — NURSING
1152  Pt did labs, saw provider, and is here for zometa q 6 mon.  Pt denies any pain or complaint.

## 2023-11-02 NOTE — PROGRESS NOTES
Problem List:   IDC Right Breast, Triple Negative; follow up     Current Treatment:  Olaparib 300 mg p.o. twice daily for 1 year or until disease progression or unacceptable toxicity, whichever occurs 1st  Start date pending 2022    Zometa IV Q 6 months   Started 2022    Treatment History:  -Mediport placed on 2022  -AC x4 cycles (2022-2022)  -DD Taxol Q2 weeks x4 cycles (2022-2022)   -mediport removal 2023      Past medical history: Tobacco abuse. Hypertension. Arthritis of right shoulder.  Procedure/surgical history:  Umbilical hernia repair .  [facility] BSO 2022-S/p Left mastectomy and mediport removed   Social history: . Lives in [place], [place]. Has 4 children. Works as a  in a [email or web address]. Has been smoking 5 cigarettes daily for 15 years. Drinks half a pint of liquor over the weekend. No illicit drugs  Menstrual and OB/GYN history: Menarche, age 14. Menopause, late 40s. First child at age 26 years. No abortions or miscarriages. Used birth control pills for 8 years. Did not use hormone replacement therapy after menopause. Breast-fed 1 child for 7 months.  Family history:  2 brothers experienced prostate cancer in their 50s  Father  from prostate cancer in his 60s-70s  No family history of breast cancer.    History of Present Illness:  62 -year-old female referred from surgery clinic, with newly diagnosed right breast cancer.  She never palpated breast mass. She noticed breast/chest wall pain after extensive muscle use in 2021. At that time, she was diagnosed with musculoskeletal pain. In January, she went back to emergency department when symptoms did not resolve. At that time, physical examination apparently noted irregularity in breast and she was referred for further imaging and biopsy revealing for triple negative breast cancer.     Interval history 2023: Patient presents today alone for scheduled breast cancer  surveillance visit. She is currently taking Lynparza of which she reports adherence. Patient says she is tolerating well. She is due to receive Zometa infusion today. She denies unintentional weight loss, N/V/D/C, SOB lymphedema, or adenopathy.  Lab work reviewed with patient, stable. Discussed follow up appointments with patient. Denies any other issues or concerns at this time.     Review of Systems: Negative as otherwise stated in HPI    Lab Results   Component Value Date    WBC 7.63 11/02/2023    RBC 3.86 (L) 11/02/2023    HGB 12.8 11/02/2023    HCT 38.5 11/02/2023    MCV 99.7 (H) 11/02/2023    MCH 33.2 (H) 11/02/2023    MCHC 33.2 11/02/2023    RDW 15.1 11/02/2023     11/02/2023    MPV 9.5 11/02/2023     CMP  Sodium Level   Date Value Ref Range Status   11/02/2023 138 136 - 145 mmol/L Final     Potassium Level   Date Value Ref Range Status   11/02/2023 4.0 3.5 - 5.1 mmol/L Final     Carbon Dioxide   Date Value Ref Range Status   11/02/2023 24 23 - 31 mmol/L Final     Blood Urea Nitrogen   Date Value Ref Range Status   11/02/2023 14.5 9.8 - 20.1 mg/dL Final     Creatinine   Date Value Ref Range Status   11/02/2023 1.00 0.55 - 1.02 mg/dL Final     Calcium Level Total   Date Value Ref Range Status   11/02/2023 9.8 8.4 - 10.2 mg/dL Final     Albumin Level   Date Value Ref Range Status   11/02/2023 4.2 3.4 - 4.8 g/dL Final     Bilirubin Total   Date Value Ref Range Status   11/02/2023 0.9 <=1.5 mg/dL Final     Alkaline Phosphatase   Date Value Ref Range Status   11/02/2023 66 40 - 150 unit/L Final     Aspartate Aminotransferase   Date Value Ref Range Status   11/02/2023 16 5 - 34 unit/L Final     Alanine Aminotransferase   Date Value Ref Range Status   11/02/2023 10 0 - 55 unit/L Final     eGFR   Date Value Ref Range Status   11/02/2023 >60 mls/min/1.73/m2 Final     Physical Exam:   General: Alert and oriented. NAD  Eye: Pupils are equal, round and reactive to light, Extraocular movements are intact. Normal  conjunctiva  HENT: Normocephalic. Oropharynx exam deferred;   Neck: Supple, Non-tender  Respiratory: Respirations are non-labored, Symmetrical chest wall expansion.  Cardiovascular: Regular rate, rhythm, Normal peripheral perfusion, No bilateral lower extremity edema  Gastrointestinal: Non-distended,  Genitourinary: Exam deferred  Lymphatics: No lymphadenopathy appreciated  Musculoskeletal: Moves all extremities  Integumentary: Intact. Warm, dry. No rashes, or lesions to visible skin  Neurologic: No focal deficits  Psychiatric: Cooperative. Appropriate mood and affect   ECOG Performance Scale: 0 - Capable of all self-care no restrictions    Vitals & Measurements  Vitals:    11/02/23 1058   BP: 130/81   Pulse: 102   Resp: 20   Temp: 98 °F (36.7 °C)     Assessment:  #IDC right breast, triple negative:  To summarize:  IDC right breast, triple negative  Right simple mastectomy 04/11/2022  2.2 cm, grade 3, no LVI, margins negative, 4 lymph nodes negative  T2 pN0 MX, G3, triple negative, AJCC anatomic stage IIA, pathological prognostic stage IIA  Postmenopausal per labs (04/28/2022)  S/P adjuvant ddAC x4, then dose dense Taxol x4 (05/30/2022-09/12/2022)   -genetic testing:  positive for deleterious mutation: BRCA2 p.* (c.2132G>A).   -11/01/2022:  Started Zometa 4 mg IV (every 6 months x3-5 years) (for risk reduction of distant metastasis)  -11/18/2022:  Bilateral screening mammography with tomosynthesis (comparison:  03/18/2022 mammogram): Both breasts benign, BI-RADS 2 (continue annual screening mammography with digital breast tomosynthesis)    Plan:  IDC right breast, triple negative  -postmenopausal (per labs 04/28/2022)     S/P adjuvant ddAC x4, then adjuvant dose dense Taxol x4 (05/30/2022-09/12/2022)     pT2 pN0; S/P Bilateral mastectomy   Does not need adjuvant radiotherapy     Genetic testing + for deleterious BRCA2 mutation  Will benefit from 1 year of adjuvant olaparib (triple negative pT2  disease)  Olaparib 300 mg p.o. twice daily for 1 year or until disease progression or unacceptable toxicity, whichever occurs 1st  May be taken with or without food  Monitor for myelodysplastic syndrome/acute myeloid leukemia; pneumonitis; nausea, fatigue, cytopenias, etc.      -Consider adjuvant bisphosphonate therapy for risk reduction of distant metastasis for 3-5 years in postmenopausal patients (natural or induced) with high risk node-negative or node positive tumors  -postmenopausal per labs 04/28/2022  -10/05/2022: Dental clearance obtained  -adjuvant Zometa started 11/01/2022  Continue Zometa 4 mg IV every 6 months x3-5 years (until 11/2027)     Follow up with NP in 3 months with lab work (cbc,cmp)  Continue Olaparib   Next Zometa infusion due in 6 months (5/2024)    Continue breast cancer surveillance  -History and physical exam 1-4 times per year for 5 years (04/2022-04/2027), then annually;  -Mammogram every 12 months, with no clear advantage to shorter interval imaging;  -Educate, monitor, and referred for lymphedema management  -In the absence of clinical signs or symptoms suggestive of recurrent disease, there is no indication for laboratory or imaging studies for metastasis screening;  -Active lifestyle, healthy diet, limited alcohol intake, and achieving and maintaining an ideal body weight (20-25 BMI) may lead to optimal breast cancer outcomes.  >>>  -S/P Bilateral mastectomy Therefore, no role of surveillance mammography    Above discussed at length with the patient.  All questions answered.  Discussed the rationale and potential side effects of olaparib, and gave her educational materials from [employer].  -----------------  Discussion:  Olaparib:   Dose: 300 mg p.o. twice daily with or without food.    For moderate renal impairment (creatinine clearance 31-50 mL/min), reduced dose to 200 mg twice daily.   For adverse reactions, consider dose interruption or dose reduction for recommendations.       Warnings and precautions:   1.  Myelodysplastic syndrome/acute myeloid leukemia in less than 1.5% of patients;   2.  Pneumonitis occurs in less than 1% of patients;   3.  Embryo fetal toxicity      Adverse reactions:   Most common adverse reactions (greater than or equal 20%) were anemia, nausea, fatigue, vomiting, neutropenia, leukopenia, nasopharyngitis/URI/influenza, respiratory tract infection, diarrhea, arthralgia/myalgia/dysgeusia, headache, dyspepsia, decreased appetite, constipation and stomatitis.   Most common laboratory abnormalities (in 25% or greater) were anemia, increase in MCV, decrease in lymphocytes, decrease in leukocytes, decrease in absolute neutrophil count, increasing serum creatinine, and decrease in platelets.

## 2023-11-06 NOTE — PROGRESS NOTES
I have seen the patient with the resident at the time of the appointment. The chart was reviewed. I agree with the assessment and plan. Care provided was reasonable and necessary. Professional services provided in an outpatient primary care center affiliated with a teaching institution.

## 2023-11-20 ENCOUNTER — OFFICE VISIT (OUTPATIENT)
Dept: FAMILY MEDICINE | Facility: CLINIC | Age: 63
End: 2023-11-20
Payer: MEDICAID

## 2023-11-20 VITALS
BODY MASS INDEX: 24.22 KG/M2 | TEMPERATURE: 99 F | SYSTOLIC BLOOD PRESSURE: 137 MMHG | OXYGEN SATURATION: 98 % | DIASTOLIC BLOOD PRESSURE: 85 MMHG | RESPIRATION RATE: 18 BRPM | WEIGHT: 123.38 LBS | HEART RATE: 68 BPM | HEIGHT: 60 IN

## 2023-11-20 DIAGNOSIS — Z79.83 LONG-TERM CURRENT USE OF BISPHOSPHONATE: ICD-10-CM

## 2023-11-20 DIAGNOSIS — I10 PRIMARY HYPERTENSION: Primary | ICD-10-CM

## 2023-11-20 DIAGNOSIS — M25.511 CHRONIC RIGHT SHOULDER PAIN: ICD-10-CM

## 2023-11-20 DIAGNOSIS — Z23 NEED FOR VACCINATION: ICD-10-CM

## 2023-11-20 DIAGNOSIS — G89.29 CHRONIC RIGHT SHOULDER PAIN: ICD-10-CM

## 2023-11-20 DIAGNOSIS — K59.00 CONSTIPATION, UNSPECIFIED CONSTIPATION TYPE: ICD-10-CM

## 2023-11-20 PROCEDURE — 99214 OFFICE O/P EST MOD 30 MIN: CPT | Mod: PBBFAC

## 2023-11-20 PROCEDURE — 90471 IMMUNIZATION ADMIN: CPT | Mod: PBBFAC

## 2023-11-20 PROCEDURE — 90750 HZV VACC RECOMBINANT IM: CPT | Mod: PBBFAC

## 2023-11-20 RX ORDER — METHOCARBAMOL 500 MG/1
500 TABLET, FILM COATED ORAL 4 TIMES DAILY PRN
Qty: 28 TABLET | Refills: 0 | Status: SHIPPED | OUTPATIENT
Start: 2023-11-20 | End: 2023-11-27

## 2023-11-20 RX ORDER — LIDOCAINE 50 MG/G
1 PATCH TOPICAL DAILY
Qty: 30 PATCH | Refills: 2 | Status: SHIPPED | OUTPATIENT
Start: 2023-11-20

## 2023-11-20 RX ORDER — BIOTIN 5 MG
1 CAPSULE ORAL ONCE
Qty: 90 CAPSULE | Refills: 1 | Status: SHIPPED | OUTPATIENT
Start: 2023-11-20 | End: 2023-11-20

## 2023-11-20 RX ORDER — AMLODIPINE BESYLATE 10 MG/1
10 TABLET ORAL DAILY
Qty: 90 TABLET | Refills: 1 | Status: SHIPPED | OUTPATIENT
Start: 2023-11-20 | End: 2024-05-18

## 2023-11-20 RX ORDER — HYDROCHLOROTHIAZIDE 12.5 MG/1
12.5 CAPSULE ORAL DAILY
Qty: 90 CAPSULE | Refills: 1 | Status: SHIPPED | OUTPATIENT
Start: 2023-11-20 | End: 2024-05-18

## 2023-11-20 RX ORDER — POLYETHYLENE GLYCOL 3350 17 G/17G
17 POWDER, FOR SOLUTION ORAL DAILY
Qty: 850 G | Refills: 2 | Status: SHIPPED | OUTPATIENT
Start: 2023-11-20

## 2023-11-20 RX ADMIN — Medication 0.5 ML: at 02:11

## 2023-11-20 NOTE — PROGRESS NOTES
[facility] OFFICE VISIT NOTE  Cookie Ugalde  [id number]  11/20/2023    Chief Complaint   Patient presents with    Hypertension    Joint Pain    Medication Refill     Lidocaine patches     Cookie Ugalde is a 63 y.o. female  presenting to [facility] for hypertension follow up.     Still complaining of right shoulder pain, which she states she has had all her life. Denies any aggravating factors, overuse, trauma/injury to area. Denies weakness, numbness. Controlled with lido patch and massager. Has not tried physical therapy for the pain. XR thoracic spine was ordered 1/2023 but never done.    Chronic Issues:   Constipation: controlled with Miralax PRN. Has BM every day. Stool is soft, normal. Denies abdominal pain or blood in stool.   Hypertension: amlodipine 10, HCTZ 12.5.Elevated today, 152/78, states it is due to shoulder pain. Repeat 127/85. Does not measure blood pressure at home. Denies headache, chest pain, dyspnea, palpitations, decreased urine.   Breast CA: see Maintenance  ?Arthritis right shoulder: as above  FH: father and 2 brothers had prostate CA.     Maintenance:  Breast CA: Breast clinic q6m. BRCA2+ status, stage IIa triple negative Breast cancer with Bilateral mastectomy 12/16/22  2/2 IDC (on Olaparib 300 mg bid, IV Zometa q6m)  Pap:  TLH/risk reducing BSO/cystoscopy on 2/7/23 in the setting of BRCA2+ status  Colonoscopy: 8/2023 with Ye, diverticula, 2 polyps removed. Pending bx report. No FH of colon CA.   Lung CA: quit 1 year ago, history of 30 pack year smoking. LDCT 7/31/23 Six-month follow-up is recommended of the 5 mm nodule in the lung, already scheduled.   DEXA: 8/2/23: within normal limits. used to drink EtOH 1/2 pint whiskey for about 30 year on weekends, quit 1 year ago. No FH or personal hx of fractures. Started Zometa 11/2022, q6m with Hematology for risk reduction of distant mets.   Vaccine: needs Shingles (2 of 2), Flu    Objective:   Blood pressure (!) 152/78, pulse 68,  "temperature 99 °F (37.2 °C), temperature source Oral, resp. rate 18, height 4' 11.76" (1.518 m), weight 56 kg (123 lb 6.4 oz), SpO2 98 %.   Physical Exam  General: appears well, in no acute distress   Eye: no scleral icterus   HENT: MMM, oropharynx without erythema/exudate   Neck: supple, no lymphadenopathy, no carotid bruits   Respiratory: clear to auscultation bilaterally, nonlabored respirations   Cardiovascular: regular rate and rhythm without murmurs or gallops, no edema in bilateral lower extremities   Gastrointestinal: soft, non-tender, non-distended, bowel sounds present   Genitourinary: no suprapubic tenderness   Musculoskeletal: no gross deformities observed. S/p bilateral mastectomy, well healed scars over bilateral chest without redness or discharge. TTP over medial border of right scapula. UE full aROM, sensation intact. Scapula symmetrical.   Integumentary: no acute rashes or skin lesions observed    Neuro: No focal lesions observed     Current Medications:   Current Outpatient Medications   Medication Sig Dispense Refill    amLODIPine (NORVASC) 10 MG tablet Take 1 tablet (10 mg total) by mouth once daily. 90 tablet 1    calcium carbonate-vitamin D3 (CALCIUM 600 WITH VITAMIN D3) 600 mg-12.5 mcg (500 unit) Cap Take 1 capsule by mouth once. for 1 dose 90 capsule 1    hydroCHLOROthiazide (MICROZIDE) 12.5 mg capsule Take 1 capsule (12.5 mg total) by mouth once daily. 90 capsule 1    LIDOcaine (LIDODERM) 5 % Place 1 patch onto the skin once daily. Remove & Discard patch within 12 hours or as directed by MD 30 patch 2    methocarbamoL (ROBAXIN) 500 MG Tab Take 1 tablet (500 mg total) by mouth 4 (four) times daily as needed. 28 tablet 0    olaparib (LYNPARZA) tablet 150 mg Take 2 tablets (300 mg total) by mouth 2 (two) times daily. 120 tablet 6    polyethylene glycol (MIRALAX) 17 gram/dose powder Take 17 g by mouth once daily. 850 g 2     Current Facility-Administered Medications   Medication Dose Route " Frequency Provider Last Rate Last Admin    varicella-zoster gE-AS01B (PF)(SHINGRIX) 50 mcg/0.5 mL injection  0.5 mL Intramuscular 1 time in Clinic/HOD Giovana Sheets MD           Assessment:   1. Primary hypertension    2. Long-term current use of bisphosphonate    3. Chronic right shoulder pain    4. Constipation, unspecified constipation type    5. Need for vaccination        Plan:  - bp 152/78 -> 137/85 on repeat. Continue current doses of amlodipine, HCTZ. Patient to start measuring blood pressures at home.   - Shingrix dose 2 today. Informed refusal of Flu shot.   - Should pain: XR thoracic spine was ordered 1/2023 but never done; patient will go today to get it done. Refill lido patch. 7 days of robaxin ordered. PT ordered.    Orders Placed This Encounter    Ambulatory referral/consult to Physical/Occupational Therapy    calcium carbonate-vitamin D3 (CALCIUM 600 WITH VITAMIN D3) 600 mg-12.5 mcg (500 unit) Cap    amLODIPine (NORVASC) 10 MG tablet    hydroCHLOROthiazide (MICROZIDE) 12.5 mg capsule    LIDOcaine (LIDODERM) 5 %    polyethylene glycol (MIRALAX) 17 gram/dose powder    varicella-zoster gE-AS01B (PF)(SHINGRIX) 50 mcg/0.5 mL injection    methocarbamoL (ROBAXIN) 500 MG Tab         Return to clinic in 5 months for HTN follow up, or sooner if needed.     Giovana Sheets  [facility] Resident

## 2023-11-20 NOTE — PATIENT INSTRUCTIONS
Amandeep Gary,     If you are due for any health screening(s) below please notify me so we can arrange them to be ordered and scheduled. Most healthy patients at your age complete them, but you are free to accept or refuse.     If you can't do it, I'll definitely understand. If you can, I'd certainly appreciate it!    All of your core healthy metrics are met.      Lets manage your high blood pressure     Your blood pressure was above 140/90 today during your visit. We recommend that you schedule a nurse visit in two weeks to check your blood pressure and discuss ways to support your health goals.     You can also manage your health and record your blood pressure from the comfort of home by keeping a daily blood pressure log. These results are shared with and reviewed by your provider. Please print this form (Daily Blood Pressure Log) to assist you in keeping track of your blood pressure at home.     Schedule your nurse visit in two weeks to learn more about how to track and manage high blood pressure.    Daily Blood Pressure Log    Name:__________________________________                  Date of Birth:_________    Average Blood Pressure:  __________      Date: Time  (a.m.) Blood  Pressure: Pulse  Rate: Time  (p.m.) Blood  Pressure : Pulse  Rate:   Sample 8:37 127/83 84

## 2023-11-20 NOTE — PROGRESS NOTES
I have discussed the case with the resident and reviewed the resident's history and physical, assessment, plan, and progress note. I agree with the findings.       Bry Hilliard MD  [facility]

## 2024-01-02 DIAGNOSIS — C50.911 INFILTRATING DUCTAL CARCINOMA OF RIGHT BREAST: ICD-10-CM

## 2024-01-02 DIAGNOSIS — C50.919 TRIPLE NEGATIVE MALIGNANT NEOPLASM OF BREAST: ICD-10-CM

## 2024-01-02 NOTE — NURSING
Noted fax from [facility] regarding new prescription for Lynparza 150 mg. RAUL Ahn notified ULICES Duarte to discuss medication prescribed for 1 yea Patient has completed medication as prescribed. RAUL Ahn contacted [facility] who confirms medication is discontinued.

## 2024-01-04 ENCOUNTER — OFFICE VISIT (OUTPATIENT)
Dept: SURGERY | Facility: CLINIC | Age: 64
End: 2024-01-04
Payer: MEDICAID

## 2024-01-04 VITALS
DIASTOLIC BLOOD PRESSURE: 75 MMHG | HEART RATE: 76 BPM | RESPIRATION RATE: 18 BRPM | TEMPERATURE: 99 F | HEIGHT: 59 IN | OXYGEN SATURATION: 98 % | SYSTOLIC BLOOD PRESSURE: 115 MMHG | WEIGHT: 128 LBS | BODY MASS INDEX: 25.8 KG/M2

## 2024-01-04 DIAGNOSIS — Z15.02 BRCA GENE MUTATION POSITIVE IN FEMALE: Primary | ICD-10-CM

## 2024-01-04 DIAGNOSIS — Z15.01 BRCA GENE MUTATION POSITIVE IN FEMALE: Primary | ICD-10-CM

## 2024-01-04 DIAGNOSIS — Z85.3 HISTORY OF RIGHT BREAST CANCER: ICD-10-CM

## 2024-01-04 DIAGNOSIS — Z15.09 BRCA GENE MUTATION POSITIVE IN FEMALE: Primary | ICD-10-CM

## 2024-01-04 PROCEDURE — 99214 OFFICE O/P EST MOD 30 MIN: CPT | Mod: PBBFAC

## 2024-01-04 PROCEDURE — 3074F SYST BP LT 130 MM HG: CPT | Mod: CPTII,,, | Performed by: STUDENT IN AN ORGANIZED HEALTH CARE EDUCATION/TRAINING PROGRAM

## 2024-01-04 PROCEDURE — 3008F BODY MASS INDEX DOCD: CPT | Mod: CPTII,,, | Performed by: STUDENT IN AN ORGANIZED HEALTH CARE EDUCATION/TRAINING PROGRAM

## 2024-01-04 PROCEDURE — 3078F DIAST BP <80 MM HG: CPT | Mod: CPTII,,, | Performed by: STUDENT IN AN ORGANIZED HEALTH CARE EDUCATION/TRAINING PROGRAM

## 2024-01-04 PROCEDURE — 1159F MED LIST DOCD IN RCRD: CPT | Mod: CPTII,,, | Performed by: STUDENT IN AN ORGANIZED HEALTH CARE EDUCATION/TRAINING PROGRAM

## 2024-01-04 PROCEDURE — 99213 OFFICE O/P EST LOW 20 MIN: CPT | Mod: S$PBB,,, | Performed by: STUDENT IN AN ORGANIZED HEALTH CARE EDUCATION/TRAINING PROGRAM

## 2024-01-04 NOTE — PROGRESS NOTES
[facility] General Surgery Clinic Note     HPI: Cookie is a 63F with past surgical history of double masectomy here for her 6 month followup. She mentioned she has been feeling great since her surgery. She denies any  pain, tenderness, nausea, vomiting, weight changes, or fever. She did mention she does get a headache when taking her cancer medication, but noted she will be off themwhen she visits oncology again in a month. She has taken the medication every day.      PMH:           Past Medical History:   Diagnosis Date    Malignant neoplasm of right breast in female, estrogen receptor negative       infiltrating ductal, s/p right simple mastectomy, SNL biopsy, triple neg    Personal history of colonic polyps 08/30/2023     colonoscopy    Primary hypertension      Vitamin D deficiency        Meds:   Current Outpatient Medications:     amLODIPine (NORVASC) 10 MG tablet, Take 1 tablet (10 mg total) by mouth once daily., Disp: 90 tablet, Rfl: 1    hydroCHLOROthiazide (MICROZIDE) 12.5 mg capsule, Take 1 capsule (12.5 mg total) by mouth once daily., Disp: 90 capsule, Rfl: 1    LIDOcaine (LIDODERM) 5 %, Place 1 patch onto the skin once daily. Remove & Discard patch within 12 hours or as directed by MD, Disp: 30 patch, Rfl: 2    olaparib (LYNPARZA) tablet 150 mg, Take 2 tablets (300 mg total) by mouth 2 (two) times daily., Disp: 120 tablet, Rfl: 6    polyethylene glycol (MIRALAX) 17 gram/dose powder, Take 17 g by mouth once daily., Disp: 850 g, Rfl: 2    calcium carbonate-vitamin D3 (CALCIUM 600 WITH VITAMIN D3) 600 mg-12.5 mcg (500 unit) Cap, Take 1 capsule by mouth once. for 1 dose, Disp: 90 capsule, Rfl: 1  Allergies: Review of patient's allergies indicates:  No Known Allergies  Social History:   Social History                Tobacco Use    Smoking status: Former       Current packs/day: 0.00       Average packs/day: 0.5 packs/day for 24.0 years (12.0 ttl pk-yrs)       Types: Cigarettes       Start date: 1998       Quit  date:        Years since quittin.0    Smokeless tobacco: Never   Substance Use Topics    Alcohol use: Not Currently    Drug use: Yes       Types: Marijuana       Comment: on weekends      Family History:             Family History   Problem Relation Age of Onset    Stroke Mother      Hypertension Mother      Diabetes Mother      Hypertension Father      Prostate cancer Father           metastatic    Heart disease Father      Prostate cancer Brother 50         metastatic    Prostate cancer Brother 57    Breast cancer Paternal Aunt      Breast cancer Paternal Aunt      Breast cancer Paternal Aunt      Breast cancer Paternal Aunt      Breast cancer Paternal Cousin      Other Daughter           BRCA2 negative, [facility]       Surgical History:             Past Surgical History:   Procedure Laterality Date    COLONOSCOPY W/ BIOPSIES AND POLYPECTOMY   2023     Brett Ye MD    CYSTOSCOPY N/A 2023     Procedure: CYSTOSCOPY;  Surgeon: Joelle Lira MD;  Location: [facility];  Service: OB/GYN;  Laterality: N/A;    HERNIA REPAIR        HYSTERECTOMY, TOTAL, LAPAROSCOPIC, WITH SALPINGO-OOPHORECTOMY N/A 2023     Procedure: HYSTERECTOMY,TOTAL,LAPAROSCOPIC,WITH SALPINGO-OOPHORECTOMY;  Surgeon: Joelle Lira MD;  Location: [facility] OR;  Service: OB/GYN;  Laterality: N/A;  enseal and luken's trap    MEDIPORT INSERTION, SINGLE   2022    REMOVAL OF VASCULAR ACCESS PORT Left 2022     Procedure: REMOVAL, VASCULAR ACCESS PORT;  Surgeon: Sofie Powers MD;  Location: [facility] OR;  Service: General;  Laterality: Left;    SIMPLE MASTECTOMY Left 2022     Procedure: MASTECTOMY, SIMPLE;  Surgeon: Sofie Powers MD;  Location: [facility] OR;  Service: General;  Laterality: Left;    SIMPLE MASTECTOMY W/ SENTINEL NODE BIOPSY Right 2022    UMBILICAL HERNIA REPAIR N/A       Review of Systems:  Skin: No rashes or itching.  Head: Denies headache or recent trauma.  Eyes: Denies eye pain or double  vision.  Neck: Denies swelling or hoarseness of voice.  Respiratory: Denies shortness of breath or chest pain  Cardiac: Denies palpitations or swelling in hands/feet.  Gastrointestinal: Denies nausea, denies vomiting.   Urinary: Denies dysuria or hematuria.  Vascular: Denies claudication or leg swelling.  Neuro: Denies motor deficits. Denies weakness.  Endocrine: Denies excessive sweating or cold intolerance.  Psych: Denies memory problems. Denies anxiety.     Objective:     Vitals:        Vitals:     01/04/24 1208   BP: 115/75   Pulse: 76   Resp: 18   Temp: 98.7 °F (37.1 °C)         Physical Exam:  Gen: NAD  Neuro: awake, alert, answering questions appropriately  CV: RRR  Resp: non-labored breathing, EDWAR  Abd: soft, ND, NT  Breast: Both mastectomy incision sites well healed. No masses felt on breast exam.  Ext: moves all 4 spontaneously and purposefully. Mediport site well healed  Skin: warm, well perfused     Pertinent Labs:  N/A     Imaging:  N/A     Micro/Path/Other:  N/A     Assessment/Plan:  Cookie is a 63F with PMH of double masectomy here for a followup. She is doing well after her surgery and has not had any new symptoms appear suggesting problems with the surgery or remission of her cancer.     - Followup in 6 months.     Anup Brooks MS3  01/04/2024 12:37 PM       PGY-3 addendum  I have seen and examined the patient with the student. Above note has been reviewed and edited.       Patient with history of triple negative IDC of R breast (s/p mastectomy 4/11/22) and s/p L prophylactic mastectomy on 12/14/122, BRCA positive s/p chemotherapy and now completing 1-year of bevacizumab who presents for 6-month follow-up. She reports she has been doing well. No issues with her mastectomy flaps. No masses or lymphadenopathy palpated on exam. Patient is scheduled to see oncology in February. RTC in 6 months for next exam.     Cookie Parr MD PGY-3  [facility] General Surgery  1/4/2024 12:54 PM

## 2024-01-04 NOTE — PROGRESS NOTES
[facility] General Surgery Clinic Note     HPI: Cookie is a 63F with past surgical history of double masectomy here for her 6 month followup. She mentioned she has been feeling great since her surgery. She denies any  pain, tenderness, nausea, vomiting, weight changes, or fever. She did mention she does get a headache when taking her cancer medication, but noted she will be off themwhen she visits oncology again in a month. She has taken the medication every day.      PMH:        Past Medical History:   Diagnosis Date    Malignant neoplasm of right breast in female, estrogen receptor negative       infiltrating ductal, s/p right simple mastectomy, SNL biopsy, triple neg    Personal history of colonic polyps 08/30/2023     colonoscopy    Primary hypertension      Vitamin D deficiency        Meds:   Current Outpatient Medications:     amLODIPine (NORVASC) 10 MG tablet, Take 1 tablet (10 mg total) by mouth once daily., Disp: 90 tablet, Rfl: 1    hydroCHLOROthiazide (MICROZIDE) 12.5 mg capsule, Take 1 capsule (12.5 mg total) by mouth once daily., Disp: 90 capsule, Rfl: 1    LIDOcaine (LIDODERM) 5 %, Place 1 patch onto the skin once daily. Remove & Discard patch within 12 hours or as directed by MD, Disp: 30 patch, Rfl: 2    olaparib (LYNPARZA) tablet 150 mg, Take 2 tablets (300 mg total) by mouth 2 (two) times daily., Disp: 120 tablet, Rfl: 6    polyethylene glycol (MIRALAX) 17 gram/dose powder, Take 17 g by mouth once daily., Disp: 850 g, Rfl: 2    calcium carbonate-vitamin D3 (CALCIUM 600 WITH VITAMIN D3) 600 mg-12.5 mcg (500 unit) Cap, Take 1 capsule by mouth once. for 1 dose, Disp: 90 capsule, Rfl: 1  Allergies: Review of patient's allergies indicates:  No Known Allergies  Social History:   Social History            Tobacco Use    Smoking status: Former       Current packs/day: 0.00       Average packs/day: 0.5 packs/day for 24.0 years (12.0 ttl pk-yrs)       Types: Cigarettes       Start date: 1998       Quit date:  Marcella Melton saw the pt on 05/08/2021 for dysuria. The urine culture came back >100,000 CFU/mL Escherichia coli Abnormal. The pt was treated with Ciprofloxacin HCl 500 mg Oral 2 TIMES DAILY and Fluconazole 150 mg Oral ONCE, May repeat in 1 week if symptoms persist. The CIPROFLOXACIN is <=0.25 ug/mL Susceptible. RN called [phone] and informed of the results. She informed RN that the bleeding has stopped. She will continue to take the full course of the AX and call the PCP or UC for any worsening of s/s.             Years since quittin.0    Smokeless tobacco: Never   Substance Use Topics    Alcohol use: Not Currently    Drug use: Yes       Types: Marijuana       Comment: on weekends      Family History:         Family History   Problem Relation Age of Onset    Stroke Mother      Hypertension Mother      Diabetes Mother      Hypertension Father      Prostate cancer Father           metastatic    Heart disease Father      Prostate cancer Brother 50         metastatic    Prostate cancer Brother 57    Breast cancer Paternal Aunt      Breast cancer Paternal Aunt      Breast cancer Paternal Aunt      Breast cancer Paternal Aunt      Breast cancer Paternal Cousin      Other Daughter           BRCA2 negative, [facility]       Surgical History:         Past Surgical History:   Procedure Laterality Date    COLONOSCOPY W/ BIOPSIES AND POLYPECTOMY   2023     Brett Ye MD    CYSTOSCOPY N/A 2023     Procedure: CYSTOSCOPY;  Surgeon: Joelle Lira MD;  Location: [facility] OR;  Service: OB/GYN;  Laterality: N/A;    HERNIA REPAIR        HYSTERECTOMY, TOTAL, LAPAROSCOPIC, WITH SALPINGO-OOPHORECTOMY N/A 2023     Procedure: HYSTERECTOMY,TOTAL,LAPAROSCOPIC,WITH SALPINGO-OOPHORECTOMY;  Surgeon: Joelle Lira MD;  Location: [facility] OR;  Service: OB/GYN;  Laterality: N/A;  enseal and luken's trap    MEDIPORT INSERTION, SINGLE   2022    REMOVAL OF VASCULAR ACCESS PORT Left 2022     Procedure: REMOVAL, VASCULAR ACCESS PORT;  Surgeon: Sofie Powers MD;  Location: [facility] OR;  Service: General;  Laterality: Left;    SIMPLE MASTECTOMY Left 2022     Procedure: MASTECTOMY, SIMPLE;  Surgeon: Sofie Powers MD;  Location: [facility] OR;  Service: General;  Laterality: Left;    SIMPLE MASTECTOMY W/ SENTINEL NODE BIOPSY Right 2022    UMBILICAL HERNIA REPAIR N/A       Review of Systems:  Skin: No rashes or itching.  Head: Denies headache or recent trauma.  Eyes: Denies eye pain or double vision.  Neck:  Denies swelling or hoarseness of voice.  Respiratory: Denies shortness of breath or chest pain  Cardiac: Denies palpitations or swelling in hands/feet.  Gastrointestinal: Denies nausea, denies vomiting.   Urinary: Denies dysuria or hematuria.  Vascular: Denies claudication or leg swelling.  Neuro: Denies motor deficits. Denies weakness.  Endocrine: Denies excessive sweating or cold intolerance.  Psych: Denies memory problems. Denies anxiety.     Objective:     Vitals:      Vitals:     01/04/24 1208   BP: 115/75   Pulse: 76   Resp: 18   Temp: 98.7 °F (37.1 °C)         Physical Exam:  Gen: NAD  Neuro: awake, alert, answering questions appropriately  CV: RRR  Resp: non-labored breathing, EDWAR  Abd: soft, ND, NT  Breast: Both mastectomy incision sites well healed. No masses felt on breast exam.  Ext: moves all 4 spontaneously and purposefully. Mediport site well healed  Skin: warm, well perfused     Pertinent Labs:  N/A     Imaging:  N/A     Micro/Path/Other:  N/A     Assessment/Plan:  Cookie is a 63F with PMH of double masectomy here for a followup. She is doing well after her surgery and has not had any new symptoms appear suggesting problems with the surgery or remission of her cancer.     - Followup in 6 months.     Anup Brooks MS3  01/04/2024 12:37 PM      PGY-3 addendum  I have seen and examined the patient with the student. Above note has been reviewed and edited.      Patient with history of triple negative IDC of R breast (s/p mastectomy 4/11/22) and s/p L prophylactic mastectomy on 12/14/122, BRCA positive s/p chemotherapy and now completing 1-year of bevacizumab who presents for 6-month follow-up. She reports she has been doing well. No issues with her mastectomy flaps. No masses or lymphadenopathy palpated on exam. Patient is scheduled to see oncology in February. RTC in 6 months for next exam.    Cookie Parr MD PGY-3  [facility] General Surgery  1/4/2024 12:54 PM

## 2024-01-04 NOTE — PROGRESS NOTES
Patient presented to clinic with no complaints. Patient seen by Dr. Cookie Parr. RTC 6 mths. Discharge education was given, verbalized understanding.

## 2024-01-24 DIAGNOSIS — C50.911 INFILTRATING DUCTAL CARCINOMA OF RIGHT BREAST: ICD-10-CM

## 2024-01-24 DIAGNOSIS — C50.919 TRIPLE NEGATIVE MALIGNANT NEOPLASM OF BREAST: ICD-10-CM

## 2024-02-01 ENCOUNTER — HOSPITAL ENCOUNTER (OUTPATIENT)
Dept: RADIOLOGY | Facility: HOSPITAL | Age: 64
Discharge: HOME | End: 2024-02-01
Attending: INTERNAL MEDICINE
Payer: MEDICAID

## 2024-02-01 DIAGNOSIS — R91.1 SOLITARY PULMONARY NODULE: ICD-10-CM

## 2024-02-01 PROCEDURE — 71250 CT THORAX DX C-: CPT | Mod: TC

## 2024-02-02 ENCOUNTER — OFFICE VISIT (OUTPATIENT)
Dept: HEMATOLOGY/ONCOLOGY | Facility: CLINIC | Age: 64
End: 2024-02-02
Payer: MEDICAID

## 2024-02-02 VITALS
OXYGEN SATURATION: 100 % | TEMPERATURE: 99 F | WEIGHT: 124 LBS | HEIGHT: 59 IN | HEART RATE: 109 BPM | SYSTOLIC BLOOD PRESSURE: 138 MMHG | DIASTOLIC BLOOD PRESSURE: 87 MMHG | RESPIRATION RATE: 20 BRPM | BODY MASS INDEX: 25 KG/M2

## 2024-02-02 DIAGNOSIS — Z90.13 H/O BILATERAL MASTECTOMY: ICD-10-CM

## 2024-02-02 DIAGNOSIS — Z15.01 BRCA2 GENE MUTATION POSITIVE: ICD-10-CM

## 2024-02-02 DIAGNOSIS — Z15.09 BRCA2 GENE MUTATION POSITIVE: ICD-10-CM

## 2024-02-02 DIAGNOSIS — C50.919 TRIPLE NEGATIVE MALIGNANT NEOPLASM OF BREAST: ICD-10-CM

## 2024-02-02 DIAGNOSIS — C50.911 INFILTRATING DUCTAL CARCINOMA OF RIGHT BREAST: Primary | ICD-10-CM

## 2024-02-02 PROCEDURE — 1159F MED LIST DOCD IN RCRD: CPT | Mod: CPTII,,, | Performed by: NURSE PRACTITIONER

## 2024-02-02 PROCEDURE — 3075F SYST BP GE 130 - 139MM HG: CPT | Mod: CPTII,,, | Performed by: NURSE PRACTITIONER

## 2024-02-02 PROCEDURE — 99213 OFFICE O/P EST LOW 20 MIN: CPT | Mod: PBBFAC | Performed by: NURSE PRACTITIONER

## 2024-02-02 PROCEDURE — 1160F RVW MEDS BY RX/DR IN RCRD: CPT | Mod: CPTII,,, | Performed by: NURSE PRACTITIONER

## 2024-02-02 PROCEDURE — 3079F DIAST BP 80-89 MM HG: CPT | Mod: CPTII,,, | Performed by: NURSE PRACTITIONER

## 2024-02-02 PROCEDURE — 3008F BODY MASS INDEX DOCD: CPT | Mod: CPTII,,, | Performed by: NURSE PRACTITIONER

## 2024-02-02 PROCEDURE — 99214 OFFICE O/P EST MOD 30 MIN: CPT | Mod: S$PBB,,, | Performed by: NURSE PRACTITIONER

## 2024-02-02 NOTE — PROGRESS NOTES
Problem List:   IDC Right Breast, Triple Negative; follow up     Current Treatment:  Olaparib 300 mg p.o. twice daily for 1 year or until disease progression or unacceptable toxicity, whichever occurs 1st  Start date pending 2022    Zometa IV Q 6 months   Started 2022    Treatment History:  -Mediport placed on 2022  -AC x4 cycles (2022-2022)  -DD Taxol Q2 weeks x4 cycles (2022-2022)   -mediport removal 2023      Past medical history: Tobacco abuse. Hypertension. Arthritis of right shoulder.  Procedure/surgical history:  Umbilical hernia repair .  [facility] 2022-S/p Left mastectomy and mediport removed   Social history: . Lives in [place], [place]. Has 4 children. Works as a  in a [employer]. Has been smoking 5 cigarettes daily for 15 years. Drinks half a pint of liquor over the weekend. No illicit drugs  Menstrual and OB/GYN history: Menarche, age 14. Menopause, late 40s. First child at age 26 years. No abortions or miscarriages. Used birth control pills for 8 years. Did not use hormone replacement therapy after menopause. Breast-fed 1 child for 7 months.  Family history:  2 brothers experienced prostate cancer in their 50s  Father  from prostate cancer in his 60s-70s  No family history of breast cancer.    History of Present Illness:  62 -year-old female referred from surgery clinic, with newly diagnosed right breast cancer.  She never palpated breast mass. She noticed breast/chest wall pain after extensive muscle use in 2021. At that time, she was diagnosed with musculoskeletal pain. In January, she went back to emergency department when symptoms did not resolve. At that time, physical examination apparently noted irregularity in breast and she was referred for further imaging and biopsy revealing for triple negative breast cancer.     Interval history 2024: Patient presents today alone for scheduled breast cancer  surveillance visit. She is currently taking Lynparza and receiving Zometa infusions every 6 months.  Patient reports adherence to Lynparza without any reportable symptoms.  She is compliant with Zometa infusions every 6 months.  Patient has completed recommended year of Lynparza as of December 2023.  Therefore patient has been informed that she can now discontinue Lynparza. She denies unintentional weight loss, N/V/D/C, SOB, unusual headaches, lymphedema, or adenopathy.  Lab work reviewed with patient, [place]. Discussed follow up appointments with patient. Denies any other issues or concerns at this time.     Review of Systems   All other systems reviewed and are negative.      Physical Exam  Constitutional:       Appearance: Normal appearance.   HENT:      Head: Normocephalic.      Mouth/Throat:      Mouth: Mucous membranes are moist.   Eyes:      Pupils: Pupils are equal, round, and reactive to light.   Cardiovascular:      Rate and Rhythm: Normal rate and regular rhythm.      Pulses: Normal pulses.      Heart sounds: Normal heart sounds.   Pulmonary:      Effort: Pulmonary effort is normal.      Breath sounds: Normal breath sounds.   Chest:   Breasts:     Right: Absent.      Left: Absent.   Abdominal:      General: Bowel sounds are normal.      Palpations: Abdomen is soft.   Musculoskeletal:         General: Normal range of motion.      Cervical back: Normal range of motion.   Skin:     General: Skin is warm and dry.      Capillary Refill: Capillary refill takes less than 2 seconds.   Neurological:      General: No focal deficit present.      Mental Status: She is alert and oriented to person, place, and time.   Psychiatric:         Attention and Perception: Attention normal.         Mood and Affect: Affect normal.         Speech: Speech normal.         Behavior: Behavior normal.         Thought Content: Thought content normal.         Lab Results   Component Value Date    WBC 7.53 02/02/2024    RBC 4.14 (L)  02/02/2024    HGB 13.4 02/02/2024    HCT 39.5 02/02/2024    MCV 95.4 (H) 02/02/2024    MCH 32.4 (H) 02/02/2024    MCHC 33.9 02/02/2024    RDW 14.7 02/02/2024     02/02/2024    MPV 9.5 02/02/2024     CMP  Sodium Level   Date Value Ref Range Status   02/02/2024 137 136 - 145 mmol/L Final     Potassium Level   Date Value Ref Range Status   02/02/2024 3.8 3.5 - 5.1 mmol/L Final     Carbon Dioxide   Date Value Ref Range Status   02/02/2024 24 23 - 31 mmol/L Final     Blood Urea Nitrogen   Date Value Ref Range Status   02/02/2024 17.2 9.8 - 20.1 mg/dL Final     Creatinine   Date Value Ref Range Status   02/02/2024 0.99 0.55 - 1.02 mg/dL Final     Calcium Level Total   Date Value Ref Range Status   02/02/2024 9.4 8.4 - 10.2 mg/dL Final     Albumin Level   Date Value Ref Range Status   02/02/2024 4.2 3.4 - 4.8 g/dL Final     Bilirubin Total   Date Value Ref Range Status   02/02/2024 0.9 <=1.5 mg/dL Final     Alkaline Phosphatase   Date Value Ref Range Status   02/02/2024 64 40 - 150 unit/L Final     Aspartate Aminotransferase   Date Value Ref Range Status   02/02/2024 17 5 - 34 unit/L Final     Alanine Aminotransferase   Date Value Ref Range Status   02/02/2024 11 0 - 55 unit/L Final     eGFR   Date Value Ref Range Status   02/02/2024 >60 mls/min/1.73/m2 Final       Vitals & Measurements  Vitals:    02/02/24 1005   BP: 138/87   Pulse: 109   Resp: 20   Temp: 98.9 °F (37.2 °C)     Assessment:  #IDC right breast, triple negative:  To summarize:  IDC right breast, triple negative  Right simple mastectomy 04/11/2022  2.2 cm, grade 3, no LVI, margins negative, 4 lymph nodes negative  T2 pN0 MX, G3, triple negative, AJCC anatomic stage IIA, pathological prognostic stage IIA  Postmenopausal per labs (04/28/2022)  S/P adjuvant ddAC x4, then dose dense Taxol x4 (05/30/2022-09/12/2022)   -genetic testing:  positive for deleterious mutation: BRCA2 p.* (c.0820G>A).   -11/01/2022:  Started Zometa 4 mg IV (every 6 months x3-5  years) (for risk reduction of distant metastasis)  -11/18/2022:  Bilateral screening mammography with tomosynthesis (comparison:  03/18/2022 mammogram): Both breasts benign, BI-RADS 2 (continue annual screening mammography with digital breast tomosynthesis)    Plan:  IDC right breast, triple negative  -postmenopausal (per labs 04/28/2022)     S/P adjuvant ddAC x4, then adjuvant dose dense Taxol x4 (05/30/2022-09/12/2022)     pT2 pN0; S/P Bilateral mastectomy   Does not need adjuvant radiotherapy     Genetic testing + for deleterious BRCA2 mutation  Will benefit from 1 year of adjuvant olaparib (triple negative pT2 disease)  Olaparib 300 mg p.o. twice daily for 1 year or until disease progression or unacceptable toxicity, whichever occurs 1st  May be taken with or without food  Monitor for myelodysplastic syndrome/acute myeloid leukemia; pneumonitis; nausea, fatigue, cytopenias, etc.      -Consider adjuvant bisphosphonate therapy for risk reduction of distant metastasis for 3-5 years in postmenopausal patients (natural or induced) with high risk node-negative or node positive tumors  -postmenopausal per labs 04/28/2022  -10/05/2022: Dental clearance obtained  -adjuvant Zometa started 11/01/2022  Continue Zometa 4 mg IV every 6 months x3-5 years (until 11/2027)     Follow up with NP in 3 months with lab work (cbc,cmp)  disontinue Olaparib   Next Zometa infusion due in 6 months (5/2024)-Scheduled for     Continue breast cancer surveillance  -History and physical exam 1-4 times per year for 5 years (04/2022-04/2027), then annually;  -Mammogram every 12 months, with no clear advantage to shorter interval imaging;  -Educate, monitor, and referred for lymphedema management  -In the absence of clinical signs or symptoms suggestive of recurrent disease, there is no indication for laboratory or imaging studies for metastasis screening;  -Active lifestyle, healthy diet, limited alcohol intake, and achieving and maintaining an  ideal body weight (20-25 BMI) may lead to optimal breast cancer outcomes.  >>>  -S/P Bilateral mastectomy Therefore, no role of surveillance mammography    Above discussed at length with the patient.  All questions answered.  Discussed the rationale and potential side effects of olaparib, and gave her educational materials from [facility].  -----------------  Discussion:  Olaparib:   Dose: 300 mg p.o. twice daily with or without food.    For moderate renal impairment (creatinine clearance 31-50 mL/min), reduced dose to 200 mg twice daily.   For adverse reactions, consider dose interruption or dose reduction for recommendations.      Warnings and precautions:   1.  Myelodysplastic syndrome/acute myeloid leukemia in less than 1.5% of patients;   2.  Pneumonitis occurs in less than 1% of patients;   3.  Embryo fetal toxicity      Adverse reactions:   Most common adverse reactions (greater than or equal 20%) were anemia, nausea, fatigue, vomiting, neutropenia, leukopenia, nasopharyngitis/URI/influenza, respiratory tract infection, diarrhea, arthralgia/myalgia/dysgeusia, headache, dyspepsia, decreased appetite, constipation and stomatitis.   Most common laboratory abnormalities (in 25% or greater) were anemia, increase in MCV, decrease in lymphocytes, decrease in leukocytes, decrease in absolute neutrophil count, increasing serum creatinine, and decrease in platelets.

## 2024-02-05 ENCOUNTER — OFFICE VISIT (OUTPATIENT)
Dept: PULMONOLOGY | Facility: CLINIC | Age: 64
End: 2024-02-05
Payer: MEDICAID

## 2024-02-05 VITALS
OXYGEN SATURATION: 100 % | SYSTOLIC BLOOD PRESSURE: 138 MMHG | HEIGHT: 62 IN | DIASTOLIC BLOOD PRESSURE: 84 MMHG | RESPIRATION RATE: 18 BRPM | HEART RATE: 88 BPM | BODY MASS INDEX: 23.67 KG/M2 | WEIGHT: 128.63 LBS | TEMPERATURE: 98 F

## 2024-02-05 DIAGNOSIS — R91.1 LUNG NODULE: ICD-10-CM

## 2024-02-05 DIAGNOSIS — R91.1 SOLITARY PULMONARY NODULE: Primary | ICD-10-CM

## 2024-02-05 PROCEDURE — 3075F SYST BP GE 130 - 139MM HG: CPT | Mod: CPTII,,, | Performed by: INTERNAL MEDICINE

## 2024-02-05 PROCEDURE — 99205 OFFICE O/P NEW HI 60 MIN: CPT | Mod: S$PBB,,, | Performed by: INTERNAL MEDICINE

## 2024-02-05 PROCEDURE — 99214 OFFICE O/P EST MOD 30 MIN: CPT | Mod: PBBFAC

## 2024-02-05 PROCEDURE — 3079F DIAST BP 80-89 MM HG: CPT | Mod: CPTII,,, | Performed by: INTERNAL MEDICINE

## 2024-02-05 PROCEDURE — 3008F BODY MASS INDEX DOCD: CPT | Mod: CPTII,,, | Performed by: INTERNAL MEDICINE

## 2024-02-05 PROCEDURE — 1160F RVW MEDS BY RX/DR IN RCRD: CPT | Mod: CPTII,,, | Performed by: INTERNAL MEDICINE

## 2024-02-05 PROCEDURE — 1159F MED LIST DOCD IN RCRD: CPT | Mod: CPTII,,, | Performed by: INTERNAL MEDICINE

## 2024-02-05 NOTE — PROGRESS NOTES
"Subjective:       Patient ID: Cookie Ugalde is a 63 y.o. female.    Chief Complaint: New pt/ lung nodule; LDCT 08/01/2023, CT 02/01/2024    Patient is a 63-year-old black female that was diagnosed with triple negative breast cancer in 2021.  Patient is an active cigarette smoker.  As such she was referred for low-dose lung cancer screening in the summer of 2023.  That CT revealed a 5 mm left lung nodule.  There were a few other scattered punctate nodules noted as well.  She was then scheduled for six-month follow-up CT which was completed last week but has not yet been interpreted.  She has no complaints today.        Review of Systems   Constitutional: Negative.    HENT: Negative.     Eyes: Negative.    Respiratory: Negative.     Cardiovascular: Negative.    Gastrointestinal: Negative.    Musculoskeletal: Negative.    Skin: Negative.    Neurological: Negative.    Endo/Heme/Allergies: Negative.    Psychiatric/Behavioral: Negative.     All other systems reviewed and are negative.        Objective:   /84 (BP Location: Left arm)   Pulse 88   Temp 98 °F (36.7 °C) (Oral)   Resp 18   Ht 5' 2" (1.575 m)   Wt 58.3 kg (128 lb 9.6 oz)   SpO2 100% Comment: room air  BMI 23.52 kg/m²     Physical Exam  Vitals reviewed.   Constitutional:       Appearance: Normal appearance.   HENT:      Head: Normocephalic and atraumatic.   Eyes:      Extraocular Movements: Extraocular movements intact.      Pupils: Pupils are equal, round, and reactive to light.   Cardiovascular:      Rate and Rhythm: Normal rate and regular rhythm.      Pulses: Normal pulses.      Heart sounds: Normal heart sounds.   Pulmonary:      Effort: Pulmonary effort is normal. No respiratory distress.      Breath sounds: Normal breath sounds. No wheezing, rhonchi or rales.   Abdominal:      General: There is no distension.      Palpations: Abdomen is soft. There is no mass.      Tenderness: There is no abdominal tenderness. There is no guarding. "   Musculoskeletal:      Cervical back: No tenderness.      Right lower leg: No edema.      Left lower leg: No edema.   Lymphadenopathy:      Cervical: No cervical adenopathy.   Neurological:      Mental Status: She is alert.       CT from 02/01/2024 was personally reviewed by myself.  It has not yet been interpreted by Radiology.  The left-sided 5 mm nodule appears roughly stable to me when accounting for differences in technique and slice placement.  We will await final read  Assessment:       Lung nodule  -     Ambulatory referral/consult to Pulmonology         Plan:     The left-sided 5 mm pulmonary nodule appears largely unchanged when taking into account differences in technique.  As noted above, I will await the final interpretation from Radiology however in light of her smoking history and history of breast cancer, we will proceed with a six-month follow-up CT scan.    Real Villafuerte MD

## 2024-03-28 ENCOUNTER — DOCUMENTATION ONLY (OUTPATIENT)
Dept: HEMATOLOGY/ONCOLOGY | Facility: CLINIC | Age: 64
End: 2024-03-28
Payer: MEDICAID

## 2024-04-17 ENCOUNTER — OFFICE VISIT (OUTPATIENT)
Dept: FAMILY MEDICINE | Facility: CLINIC | Age: 64
End: 2024-04-17
Payer: MEDICAID

## 2024-04-17 VITALS
TEMPERATURE: 98 F | WEIGHT: 119.63 LBS | OXYGEN SATURATION: 100 % | SYSTOLIC BLOOD PRESSURE: 123 MMHG | HEIGHT: 62 IN | BODY MASS INDEX: 22.01 KG/M2 | HEART RATE: 88 BPM | DIASTOLIC BLOOD PRESSURE: 80 MMHG

## 2024-04-17 DIAGNOSIS — G89.29 CHRONIC RIGHT SHOULDER PAIN: ICD-10-CM

## 2024-04-17 DIAGNOSIS — K59.00 CONSTIPATION, UNSPECIFIED CONSTIPATION TYPE: ICD-10-CM

## 2024-04-17 DIAGNOSIS — I10 PRIMARY HYPERTENSION: ICD-10-CM

## 2024-04-17 DIAGNOSIS — Z79.83 LONG-TERM CURRENT USE OF BISPHOSPHONATE: ICD-10-CM

## 2024-04-17 DIAGNOSIS — M25.511 CHRONIC RIGHT SHOULDER PAIN: ICD-10-CM

## 2024-04-17 PROCEDURE — 99213 OFFICE O/P EST LOW 20 MIN: CPT | Mod: PBBFAC

## 2024-04-17 RX ORDER — LIDOCAINE 50 MG/G
1 PATCH TOPICAL DAILY
Qty: 30 PATCH | Refills: 2 | Status: SHIPPED | OUTPATIENT
Start: 2024-04-17

## 2024-04-17 RX ORDER — HYDROCHLOROTHIAZIDE 12.5 MG/1
12.5 CAPSULE ORAL DAILY
Qty: 90 CAPSULE | Refills: 1 | Status: SHIPPED | OUTPATIENT
Start: 2024-04-17 | End: 2024-10-14

## 2024-04-17 RX ORDER — POLYETHYLENE GLYCOL 3350 17 G/17G
17 POWDER, FOR SOLUTION ORAL DAILY
Qty: 1700 G | Refills: 2 | Status: SHIPPED | OUTPATIENT
Start: 2024-04-17

## 2024-04-17 RX ORDER — CHOLECALCIFEROL (VITAMIN D3) 25 MCG
1000 TABLET ORAL DAILY
COMMUNITY

## 2024-04-17 RX ORDER — AMLODIPINE BESYLATE 10 MG/1
10 TABLET ORAL DAILY
Qty: 90 TABLET | Refills: 1 | Status: SHIPPED | OUTPATIENT
Start: 2024-04-17 | End: 2024-10-14

## 2024-04-17 RX ORDER — BIOTIN 5 MG
1 CAPSULE ORAL ONCE
Qty: 90 CAPSULE | Refills: 1 | Status: SHIPPED | OUTPATIENT
Start: 2024-04-17 | End: 2024-04-17

## 2024-04-17 NOTE — PROGRESS NOTES
"[facility] OFFICE VISIT NOTE  Cookie Ugalde  [id number]  04/17/2024    Chief Complaint   Patient presents with    Hypertension    Follow-up     Cookie Ugalde is a 63 y.o. female  presenting to [facility] for hypertension follow up.     No complaints today.     Chronic Issues:   Constipation: been out of Miralax for a month so has been having constipation. Miralax works for her, requests refills. Adequate fiber intake.   Hypertension: amlodipine 10, HCTZ 12.5. At goal today. Denies headache, chest pain, dyspnea, palpitations, decreased urine.   Breast CA:  Breast clinic q6m. BRCA2+ status, stage IIa triple negative Breast cancer with Bilateral mastectomy 12/16/22 2/2 IDC (completed Olaparib 300 mg bid, now just on IV Zometa q6m)  ?Arthritis right shoulder: PRN lido patch. PT ordered, patient refused saying "I can do that at home".   Lung nodule: seen on LDCT 7/31/23 5 mm nodule in the lung, already scheduled, follows with Pulmonology. Quit smoking 1 year ago, history of 30 pack year smoking.    FH: father and 2 brothers had prostate CA.     Maintenance:  Breast CA: see above  Pap: TLH/risk reducing BSO/cystoscopy on 2/7/23 in the setting of BRCA2+ status  Colonoscopy: 8/2023 with Ephraim, jd, 2 polyps removed. Pending bx report. No FH of colon CA. DANAE faxed today.   Lung CA:  see above  DEXA: 8/2/23: within normal limits. used to drink EtOH 1/2 pint whiskey for about 30 year on weekends, quit 1 year ago. No FH or personal hx of fractures. Started Zometa 11/2022, q6m with Hematology for risk reduction of distant mets. Not taking calcium. Taking OTC VT D (1000 units).   Vaccine: UTD  The 10-year ASCVD risk score (Kourtney MANZANARES, et al., 2019) is: 7.4%    Values used to calculate the score:      Age: 63 years      Sex: Female      Is Non- : Yes      Diabetic: No      Tobacco smoker: No      Systolic Blood Pressure: 123 mmHg      Is BP treated: Yes      HDL Cholesterol: 61 mg/dL      Total " "Cholesterol: 246 mg/dL      Objective:   Blood pressure 123/80, pulse 88, temperature 97.8 °F (36.6 °C), temperature source Oral, height 5' 2" (1.575 m), weight 54.3 kg (119 lb 9.6 oz), SpO2 100%.   Physical Exam  General: appears well, in no acute distress   Eye: no scleral icterus   HENT: MMM, oropharynx without erythema/exudate   Neck: supple, no lymphadenopathy, no carotid bruits   Respiratory: clear to auscultation bilaterally, nonlabored respirations   Cardiovascular: regular rate and rhythm without murmurs or gallops, no edema in bilateral lower extremities   Gastrointestinal: soft, non-tender, non-distended, bowel sounds present   Genitourinary: no suprapubic tenderness   Musculoskeletal: no gross deformities observed. S/p bilateral mastectomy, well healed scars over bilateral chest without redness or discharge. TTP over medial border of right scapula. UE full aROM, sensation intact. Scapula symmetrical.   Integumentary: no acute rashes or skin lesions observed    Neuro: No focal lesions observed     Current Medications:   Current Outpatient Medications   Medication Sig Dispense Refill    amLODIPine (NORVASC) 10 MG tablet Take 1 tablet (10 mg total) by mouth once daily. 90 tablet 1    calcium carbonate-vitamin D3 (CALCIUM 600 WITH VITAMIN D3) 600 mg-12.5 mcg (500 unit) Cap Take 1 capsule by mouth once. for 1 dose (Patient not taking: Reported on 2/5/2024) 90 capsule 1    hydroCHLOROthiazide (MICROZIDE) 12.5 mg capsule Take 1 capsule (12.5 mg total) by mouth once daily. 90 capsule 1    LIDOcaine (LIDODERM) 5 % Place 1 patch onto the skin once daily. Remove & Discard patch within 12 hours or as directed by MD 30 patch 2    polyethylene glycol (MIRALAX) 17 gram/dose powder Take 17 g by mouth once daily. 850 g 2     No current facility-administered medications for this visit.       Assessment:   1. Constipation, unspecified constipation type  - polyethylene glycol (MIRALAX) 17 gram/dose powder; Take 17 g by " mouth once daily.  Dispense: 1700 g; Refill: 2    2. Chronic right shoulder pain  - LIDOcaine (LIDODERM) 5 %; Place 1 patch onto the skin once daily. Remove & Discard patch within 12 hours or as directed by MD  Dispense: 30 patch; Refill: 2    3. Primary hypertension  - amLODIPine (NORVASC) 10 MG tablet; Take 1 tablet (10 mg total) by mouth once daily.  Dispense: 90 tablet; Refill: 1  - hydroCHLOROthiazide (MICROZIDE) 12.5 mg capsule; Take 1 capsule (12.5 mg total) by mouth once daily.  Dispense: 90 capsule; Refill: 1    4. Long-term current use of bisphosphonate  - calcium carbonate-vitamin D3 (CALCIUM 600 WITH VITAMIN D3) 600 mg-12.5 mcg (500 unit) Cap; Take 1 capsule by mouth once. for 1 dose  Dispense: 90 capsule; Refill: 1      Plan:  - BP well controlled, continue current doses of amlodipine, HCTZ  - lido patch reordered for shoulder pain PRN, instructions provided, patient understands. Consider imaging in the future (ie shoulder XR) if pain worsens or starts affecting functionality. Refused PT.   - Miralax for constipation. Discussed importance of ample hydration and fiber intake  - Pt already on OTC Vit D (1000 units). Will prescribe calcium+VTD in addition to her OTC daily VT D.   - ASCVD risk 7.4%, no DM no CV events, but borderline based on numbers from 2023. Lipid panel next visit, and if >7.5%, will consider starting medium intensity statin.       Return to clinic in 3 months for HTN follow up, or sooner if needed. (Lipid panel, A1c, CMP next visit)    Giovana Sheets  [facility] Resident

## 2024-04-24 NOTE — PROGRESS NOTES
Patient was discussed with the resident on the DOS. Services were provided at a teaching facility.   I was immediately available during the encounter. I have reviewed the resident's note.   The assessment, plan and management are reasonable and appropriate.      Alondra White MD  Family Medicine  [facility]

## 2024-05-03 ENCOUNTER — INFUSION (OUTPATIENT)
Dept: INFUSION THERAPY | Facility: HOSPITAL | Age: 64
End: 2024-05-03
Attending: INTERNAL MEDICINE
Payer: MEDICAID

## 2024-05-03 ENCOUNTER — OFFICE VISIT (OUTPATIENT)
Dept: HEMATOLOGY/ONCOLOGY | Facility: CLINIC | Age: 64
End: 2024-05-03
Payer: MEDICAID

## 2024-05-03 VITALS
HEIGHT: 63 IN | OXYGEN SATURATION: 100 % | SYSTOLIC BLOOD PRESSURE: 140 MMHG | BODY MASS INDEX: 22.47 KG/M2 | WEIGHT: 126.81 LBS | RESPIRATION RATE: 20 BRPM | HEART RATE: 92 BPM | DIASTOLIC BLOOD PRESSURE: 84 MMHG | TEMPERATURE: 99 F

## 2024-05-03 VITALS
HEART RATE: 86 BPM | DIASTOLIC BLOOD PRESSURE: 76 MMHG | SYSTOLIC BLOOD PRESSURE: 132 MMHG | RESPIRATION RATE: 20 BRPM | TEMPERATURE: 98 F | OXYGEN SATURATION: 100 %

## 2024-05-03 DIAGNOSIS — Z15.01 BRCA2 GENE MUTATION POSITIVE: Primary | ICD-10-CM

## 2024-05-03 DIAGNOSIS — C50.919 TRIPLE NEGATIVE MALIGNANT NEOPLASM OF BREAST: ICD-10-CM

## 2024-05-03 DIAGNOSIS — Z15.09 BRCA2 GENE MUTATION POSITIVE: Primary | ICD-10-CM

## 2024-05-03 DIAGNOSIS — C50.919 TRIPLE NEGATIVE MALIGNANT NEOPLASM OF BREAST: Primary | ICD-10-CM

## 2024-05-03 DIAGNOSIS — Z90.13 H/O BILATERAL MASTECTOMY: ICD-10-CM

## 2024-05-03 DIAGNOSIS — C50.911 INFILTRATING DUCTAL CARCINOMA OF RIGHT BREAST: ICD-10-CM

## 2024-05-03 DIAGNOSIS — Z15.01 BRCA2 GENE MUTATION POSITIVE: ICD-10-CM

## 2024-05-03 DIAGNOSIS — Z15.09 BRCA2 GENE MUTATION POSITIVE: ICD-10-CM

## 2024-05-03 PROCEDURE — 1159F MED LIST DOCD IN RCRD: CPT | Mod: CPTII,,, | Performed by: NURSE PRACTITIONER

## 2024-05-03 PROCEDURE — 3077F SYST BP >= 140 MM HG: CPT | Mod: CPTII,,, | Performed by: NURSE PRACTITIONER

## 2024-05-03 PROCEDURE — 1160F RVW MEDS BY RX/DR IN RCRD: CPT | Mod: CPTII,,, | Performed by: NURSE PRACTITIONER

## 2024-05-03 PROCEDURE — 96365 THER/PROPH/DIAG IV INF INIT: CPT

## 2024-05-03 PROCEDURE — 3008F BODY MASS INDEX DOCD: CPT | Mod: CPTII,,, | Performed by: NURSE PRACTITIONER

## 2024-05-03 PROCEDURE — 99214 OFFICE O/P EST MOD 30 MIN: CPT | Mod: S$PBB,,, | Performed by: NURSE PRACTITIONER

## 2024-05-03 PROCEDURE — 3079F DIAST BP 80-89 MM HG: CPT | Mod: CPTII,,, | Performed by: NURSE PRACTITIONER

## 2024-05-03 PROCEDURE — 63600175 PHARM REV CODE 636 W HCPCS: Performed by: NURSE PRACTITIONER

## 2024-05-03 PROCEDURE — 99213 OFFICE O/P EST LOW 20 MIN: CPT | Mod: PBBFAC,25 | Performed by: NURSE PRACTITIONER

## 2024-05-03 PROCEDURE — 25000003 PHARM REV CODE 250: Performed by: NURSE PRACTITIONER

## 2024-05-03 RX ORDER — ZOLEDRONIC ACID 0.04 MG/ML
4 INJECTION, SOLUTION INTRAVENOUS
OUTPATIENT
Start: 2024-10-04

## 2024-05-03 RX ORDER — HEPARIN 100 UNIT/ML
500 SYRINGE INTRAVENOUS
OUTPATIENT
Start: 2024-10-04

## 2024-05-03 RX ORDER — HEPARIN 100 UNIT/ML
500 SYRINGE INTRAVENOUS
Status: DISCONTINUED | OUTPATIENT
Start: 2024-05-03 | End: 2024-05-03 | Stop reason: HOSPADM

## 2024-05-03 RX ORDER — SODIUM CHLORIDE 0.9 % (FLUSH) 0.9 %
10 SYRINGE (ML) INJECTION
Status: DISCONTINUED | OUTPATIENT
Start: 2024-05-03 | End: 2024-05-03 | Stop reason: HOSPADM

## 2024-05-03 RX ORDER — SODIUM CHLORIDE 0.9 % (FLUSH) 0.9 %
10 SYRINGE (ML) INJECTION
OUTPATIENT
Start: 2024-10-04

## 2024-05-03 RX ORDER — ZOLEDRONIC ACID 0.04 MG/ML
4 INJECTION, SOLUTION INTRAVENOUS
Status: COMPLETED | OUTPATIENT
Start: 2024-05-03 | End: 2024-05-03

## 2024-05-03 RX ADMIN — ZOLEDRONIC ACID 4 MG: 0.04 INJECTION, SOLUTION INTRAVENOUS at 11:05

## 2024-05-03 RX ADMIN — SODIUM CHLORIDE: 9 INJECTION, SOLUTION INTRAVENOUS at 11:05

## 2024-05-03 NOTE — NURSING
Infusion Note:  Pt has an appointment today for: Labs, Pepper Ward FNP, and Infusion    Treatment Regimen: Zometa every  6 months ;      Given  Premeds given: None    PIV;    Nursing note:  Assessment performed, no complaints reported. Denies jaw pain, denied getting any invasive dental work done the last 3 months and aware to avoid invasive dental work the next 3 months. Reports compliance with Vit D and calcium supplements. Clarified zometa dose with Pardeep Minor, PharmD who states pt was borderline between zometa 3.5 mg or 4 mg, but he went with full dose 4 mg.    Next infusion appointment is for: Labs, Pepper Ward FNP, and Infusion

## 2024-05-03 NOTE — PROGRESS NOTES
Problem List:   IDC Right Breast, Triple Negative; follow up     Current Treatment:  Zometa IV Q 6 months   Started 2022    Treatment History:  -Mediport placed on 2022  -AC x4 cycles (2022-2022)  -DD Taxol Q2 weeks x4 cycles (2022-2022)   -mediport removal 2023  Olaparib 2022-2023    Past medical history: Tobacco abuse. Hypertension. Arthritis of right shoulder.  Procedure/surgical history:  Umbilical hernia repair .  ABHINAV BSO 2022-S/p Left mastectomy and mediport removed   Social history: . Lives in [place], [place]. Has 4 children. Works as a  in a [email or web address]. Has been smoking 5 cigarettes daily for 15 years. Drinks half a pint of liquor over the weekend. No illicit drugs  Menstrual and OB/GYN history: Menarche, age 14. Menopause, late 40s. First child at age 26 years. No abortions or miscarriages. Used birth control pills for 8 years. Did not use hormone replacement therapy after menopause. Breast-fed 1 child for 7 months.  Family history:  2 brothers experienced prostate cancer in their 50s  Father  from prostate cancer in his 60s-70s  No family history of breast cancer.    History of Present Illness:  62 -year-old female referred from surgery clinic, with newly diagnosed right breast cancer.  She never palpated breast mass. She noticed breast/chest wall pain after extensive muscle use in 2021. At that time, she was diagnosed with musculoskeletal pain. In January, she went back to emergency department when symptoms did not resolve. At that time, physical examination apparently noted irregularity in breast and she was referred for further imaging and biopsy revealing for triple negative breast cancer.     Interval history 5/3/2024: Patient presents today alone for scheduled breast cancer surveillance visit. She is receive Zometa infusion today.  Patient reports doing well without any significant symptoms to  report.  She does admit to some tenderness to bilateral mastectomy sites.  She denies any unusual headaches, dyspnea, unintentional weight loss, abdominal pain, blood in urine/stool, lymphedema, chest pain, fever or any signs  Lab work reviewed with patient, stable. Discussed follow up appointments with patient. Denies any other issues or concerns at this time.     Review of Systems   All other systems reviewed and are negative.      Physical Exam  Constitutional:       Appearance: Normal appearance.   HENT:      Head: Normocephalic.      Mouth/Throat:      Mouth: Mucous membranes are moist.   Eyes:      Pupils: Pupils are equal, round, and reactive to light.   Cardiovascular:      Rate and Rhythm: Normal rate and regular rhythm.      Pulses: Normal pulses.      Heart sounds: Normal heart sounds.   Pulmonary:      Effort: Pulmonary effort is normal.      Breath sounds: Normal breath sounds.   Chest:   Breasts:     Right: Absent.      Left: Absent.   Abdominal:      General: Bowel sounds are normal.      Palpations: Abdomen is soft.   Musculoskeletal:         General: Normal range of motion.      Cervical back: Normal range of motion.   Skin:     General: Skin is warm and dry.      Capillary Refill: Capillary refill takes less than 2 seconds.   Neurological:      General: No focal deficit present.      Mental Status: She is alert and oriented to person, place, and time.   Psychiatric:         Attention and Perception: Attention normal.         Mood and Affect: Affect normal.         Speech: Speech normal.         Behavior: Behavior normal.         Thought Content: Thought content normal.         Lab Results   Component Value Date    WBC 7.51 05/03/2024    RBC 4.24 05/03/2024    HGB 13.5 05/03/2024    HCT 40.0 05/03/2024    MCV 94.3 (H) 05/03/2024    MCH 31.8 (H) 05/03/2024    MCHC 33.8 05/03/2024    RDW 13.1 05/03/2024     05/03/2024    MPV 8.9 05/03/2024     CMP  Sodium Level   Date Value Ref Range Status    05/03/2024 142 136 - 145 mmol/L Final     Potassium Level   Date Value Ref Range Status   05/03/2024 3.6 3.5 - 5.1 mmol/L Final     Carbon Dioxide   Date Value Ref Range Status   05/03/2024 23 23 - 31 mmol/L Final     Blood Urea Nitrogen   Date Value Ref Range Status   05/03/2024 12.4 9.8 - 20.1 mg/dL Final     Creatinine   Date Value Ref Range Status   05/03/2024 0.87 0.55 - 1.02 mg/dL Final     Calcium Level Total   Date Value Ref Range Status   05/03/2024 9.3 8.4 - 10.2 mg/dL Final     Albumin Level   Date Value Ref Range Status   05/03/2024 4.0 3.4 - 4.8 g/dL Final     Bilirubin Total   Date Value Ref Range Status   05/03/2024 0.7 <=1.5 mg/dL Final     Alkaline Phosphatase   Date Value Ref Range Status   05/03/2024 67 40 - 150 unit/L Final     Aspartate Aminotransferase   Date Value Ref Range Status   05/03/2024 19 5 - 34 unit/L Final     Alanine Aminotransferase   Date Value Ref Range Status   05/03/2024 16 0 - 55 unit/L Final     eGFR   Date Value Ref Range Status   05/03/2024 >60 mls/min/1.73/m2 Final       Vitals & Measurements  Vitals:    05/03/24 1038   BP: (!) 140/84   Pulse: 92   Resp: 20   Temp: 98.9 °F (37.2 °C)     Assessment:  #IDC right breast, triple negative:  To summarize:  IDC right breast, triple negative  Right simple mastectomy 04/11/2022  2.2 cm, grade 3, no LVI, margins negative, 4 lymph nodes negative  T2 pN0 MX, G3, triple negative, AJCC anatomic stage IIA, pathological prognostic stage IIA  Postmenopausal per labs (04/28/2022)  S/P adjuvant ddAC x4, then dose dense Taxol x4 (05/30/2022-09/12/2022)   -genetic testing:  positive for deleterious mutation: BRCA2 p.* ([id number]).   -11/01/2022:  Started Zometa 4 mg IV (every 6 months x3-5 years) (for risk reduction of distant metastasis)  -11/18/2022:  Bilateral screening mammography with tomosynthesis (comparison:  03/18/2022 mammogram): Both breasts benign, BI-RADS 2 (continue annual screening mammography with digital breast  tomosynthesis)    Plan:  IDC right breast, triple negative  -postmenopausal (per labs 04/28/2022)     S/P adjuvant ddAC x4, then adjuvant dose dense Taxol x4 (05/30/2022-09/12/2022)     pT2 pN0; S/P Bilateral mastectomy   Does not need adjuvant radiotherapy     Genetic testing + for deleterious BRCA2 mutation  Will benefit from 1 year of adjuvant olaparib (triple negative pT2 disease)  Olaparib 300 mg p.o. twice daily for 1 year or until disease progression or unacceptable toxicity, whichever occurs 1st  May be taken with or without food  Monitor for myelodysplastic syndrome/acute myeloid leukemia; pneumonitis; nausea, fatigue, cytopenias, etc.      -Consider adjuvant bisphosphonate therapy for risk reduction of distant metastasis for 3-5 years in postmenopausal patients (natural or induced) with high risk node-negative or node positive tumors  -postmenopausal per labs 04/28/2022  -10/05/2022: Dental clearance obtained  -adjuvant Zometa started 11/01/2022  Continue Zometa 4 mg IV every 6 months x3-5 years (until 11/2027)   Olaparib 300mg po daily x 1 year completed 12/2023    Follow up with NP in 6 months with lab work (cbc,cmp,mg) prior to Zometa infusion  Okay to proceed with Zometa infusion today    Continue breast cancer surveillance  -History and physical exam 1-4 times per year for 5 years (04/2022-04/2027), then annually;  -Mammogram every 12 months, with no clear advantage to shorter interval imaging;  -Educate, monitor, and referred for lymphedema management  -In the absence of clinical signs or symptoms suggestive of recurrent disease, there is no indication for laboratory or imaging studies for metastasis screening;  -Active lifestyle, healthy diet, limited alcohol intake, and achieving and maintaining an ideal body weight (20-25 BMI) may lead to optimal breast cancer outcomes.  >>>  -S/P Bilateral mastectomy Therefore, no role of surveillance mammography    Above discussed at length with the patient.   All questions answered.  Discussed the rationale and potential side effects of olaparib, and gave her educational materials from [facility].  -----------------  Discussion:  Olaparib:   Dose: 300 mg p.o. twice daily with or without food.    For moderate renal impairment (creatinine clearance 31-50 mL/min), reduced dose to 200 mg twice daily.   For adverse reactions, consider dose interruption or dose reduction for recommendations.      Warnings and precautions:   1.  Myelodysplastic syndrome/acute myeloid leukemia in less than 1.5% of patients;   2.  Pneumonitis occurs in less than 1% of patients;   3.  Embryo fetal toxicity      Adverse reactions:   Most common adverse reactions (greater than or equal 20%) were anemia, nausea, fatigue, vomiting, neutropenia, leukopenia, nasopharyngitis/URI/influenza, respiratory tract infection, diarrhea, arthralgia/myalgia/dysgeusia, headache, dyspepsia, decreased appetite, constipation and stomatitis.   Most common laboratory abnormalities (in 25% or greater) were anemia, increase in MCV, decrease in lymphocytes, decrease in leukocytes, decrease in absolute neutrophil count, increasing serum creatinine, and decrease in platelets.

## 2024-05-06 ENCOUNTER — OFFICE VISIT (OUTPATIENT)
Dept: GYNECOLOGY | Facility: CLINIC | Age: 64
End: 2024-05-06
Payer: MEDICAID

## 2024-05-06 VITALS
DIASTOLIC BLOOD PRESSURE: 77 MMHG | RESPIRATION RATE: 18 BRPM | TEMPERATURE: 98 F | HEART RATE: 71 BPM | BODY MASS INDEX: 23.01 KG/M2 | HEIGHT: 63 IN | WEIGHT: 129.88 LBS | OXYGEN SATURATION: 100 % | SYSTOLIC BLOOD PRESSURE: 123 MMHG

## 2024-05-06 DIAGNOSIS — Z90.710 STATUS POST HYSTERECTOMY: ICD-10-CM

## 2024-05-06 DIAGNOSIS — Z01.419 WELL WOMAN EXAM WITH ROUTINE GYNECOLOGICAL EXAM: Primary | ICD-10-CM

## 2024-05-06 PROCEDURE — 99213 OFFICE O/P EST LOW 20 MIN: CPT | Mod: PBBFAC

## 2024-05-06 NOTE — PROGRESS NOTES
[facility] NOTE  [address] [address]  Phone: [phone]  Fax:[phone]    Subjective:   Chief Compliant: Well Woman/Annual Exam    HPI    Cookie Ugalde is a 63 y.o.  who presents for an annual well woman exam. The patient has no gynecological complaints today. She remains sexually active with her  and denies dyspareunia, vaginal dryness, and decreased libido. She lives at home with her  and one of her daughters and feels safe at home. She works as a . For exercise, she dances and cycles.     She underwent menopause at age 52 and denies vaginal discharge and postmenopausal bleeding. She also denies blood in urine, painful urination, urgency of urination, frequency of urination, incomplete bladder emptying, and urinary incontinence.    Allergies: NKDA  OBHx: Full-term  x4  GynHx: Menopause at age 52, s/p TLH/RRBSO/Cystoscopy on 23   Denies Hx of STIs and abnormal paps    Past Medical History:   Diagnosis Date    Malignant neoplasm of right breast in female, estrogen receptor negative     infiltrating ductal, s/p right simple mastectomy, SNL biopsy, triple neg    Personal history of colonic polyps 2023    colonoscopy    Primary hypertension     Vitamin D deficiency      Past Surgical History:   Procedure Laterality Date    COLONOSCOPY W/ BIOPSIES AND POLYPECTOMY  2023    Brett Ye MD    CYSTOSCOPY N/A 2023    Procedure: CYSTOSCOPY;  Surgeon: Jolele Lira MD;  Location: [facility];  Service: OB/GYN;  Laterality: N/A;    HERNIA REPAIR      HYSTERECTOMY, TOTAL, LAPAROSCOPIC, WITH SALPINGO-OOPHORECTOMY N/A 2023    Procedure: HYSTERECTOMY,TOTAL,LAPAROSCOPIC,WITH SALPINGO-OOPHORECTOMY;  Surgeon: Joelle Lira MD;  Location: [facility];  Service: OB/GYN;  Laterality: N/A;  enseal and luken's trap    MEDIPORT INSERTION, SINGLE  2022    REMOVAL OF VASCULAR ACCESS PORT  Left 12/14/2022    Procedure: REMOVAL, VASCULAR ACCESS PORT;  Surgeon: Sofie Powers MD;  Location: [facility] OR;  Service: General;  Laterality: Left;    SIMPLE MASTECTOMY Left 12/14/2022    Procedure: MASTECTOMY, SIMPLE;  Surgeon: Sofie Powers MD;  Location: [facility] OR;  Service: General;  Laterality: Left;    SIMPLE MASTECTOMY W/ SENTINEL NODE BIOPSY Right 04/11/2022    UMBILICAL HERNIA REPAIR N/A 1966     Medications:     Current Outpatient Medications:     amLODIPine (NORVASC) 10 MG tablet, Take 1 tablet (10 mg total) by mouth once daily., Disp: 90 tablet, Rfl: 1    hydroCHLOROthiazide (MICROZIDE) 12.5 mg capsule, Take 1 capsule (12.5 mg total) by mouth once daily., Disp: 90 capsule, Rfl: 1    LIDOcaine (LIDODERM) 5 %, Place 1 patch onto the skin once daily. Remove & Discard patch within 12 hours or as directed by MD, Disp: 30 patch, Rfl: 2    polyethylene glycol (MIRALAX) 17 gram/dose powder, Take 17 g by mouth once daily., Disp: 1700 g, Rfl: 2    vitamin D (VITAMIN D3) 1000 units Tab, Take 1,000 Units by mouth once daily., Disp: , Rfl:     calcium carbonate-vitamin D3 (CALCIUM 600 WITH VITAMIN D3) 600 mg-12.5 mcg (500 unit) Cap, Take 1 capsule by mouth once. for 1 dose, Disp: 90 capsule, Rfl: 1    Family History   Problem Relation Name Age of Onset    Hypertension Father      Prostate cancer Father          metastatic    Heart disease Father      Stroke Mother      Hypertension Mother      Diabetes Mother      Prostate cancer Brother Conner 50        metastatic    Prostate cancer Brother John 57    Other Daughter Julee         BRCA2 negative, [facility] 2023    Breast cancer Paternal Aunt      Breast cancer Paternal Aunt      Breast cancer Paternal Aunt      Breast cancer Paternal Aunt      Breast cancer Paternal Cousin      Ovarian cancer Neg Hx      Colon cancer Neg Hx      Uterine cancer Neg Hx       Social History     Socioeconomic History    Marital status:    Tobacco Use    Smoking status: Former      "Current packs/day: 0.00     Average packs/day: 0.5 packs/day for 24.0 years (12.0 ttl pk-yrs)     Types: Cigarettes     Start date:      Quit date:      Years since quittin.3    Smokeless tobacco: Never   Substance and Sexual Activity    Alcohol use: Not Currently    Drug use: Yes     Types: Marijuana     Comment: on weekends    Sexual activity: Yes     Partners: Male     Social Determinants of Health     Transportation Needs: No Transportation Needs (2024)    PRAPARE - Transportation     Lack of Transportation (Medical): No     Lack of Transportation (Non-Medical): No     Health Maintenance:   Colonoscopy: Multiple diverticula were seen in the sigmoid colon. Two polyps ranging in size from 9mm to 8mm were found in the transverse colon and complete removed using a cold snare. A single 8mm polyp was found in the cecum and completely removed using a cold snare. (23)  Bone Density: Bone mineral density within normal limits. (23)  Last pap: NILM/hrHPV- (22), now s/p TLH/RRBSO/Cystoscopy in 2023  Last mammogram: s/p bilateral mastectomy for triple negative IDC in right breast    Review of Systems  Denies fevers, chills, headache, blurry vision, nausea, vomiting, dizziness, or syncope.   Denies chest pain, shortness of breath, RUQ pain, or calf pain.     Objective:     Vitals:    24 0923   BP: 123/77   BP Location: Left forearm   Patient Position: Sitting   BP Method: Small (Automatic)   Pulse: 71   Resp: 18   Temp: 98 °F (36.7 °C)   TempSrc: Oral   SpO2: 100%   Weight: 58.9 kg (129 lb 13.6 oz)   Height: 5' 3" (1.6 m)     Body mass index is 23 kg/m².    Physical Exam:   General: alert and oriented, in no acute distress  Lungs: clear to auscultation bilaterally, no conversational dyspnea  Heart: regular rate and rhythm  Breasts: Breasts absent bilaterally, mastectomy scars well-healed, no axillary lymph nodes or lesions palpated  Abdomen: Soft, non-distended, non tender to " palpation, no involuntary guarding, no rebound tenderness   Extremities: Normal, atraumatic, non-edematous, no cords or calf tenderness, no significant calf/ankle edema  External genitalia: Normal female genitalia without lesion, discharge or tenderness. Normal appearing urethral meatus.   Speculum Exam: Vaginal mucosa with atrophic-appearing mucosa. No masses/lesions. Vaginal cuff normal in appearance without lesions.  Bimanual Exam: No pelvic lymphadenopathy noted bilaterally. Uterus and cervix surgically absent. Vaginal cuff intact, non-tender.  Note: RN chaperone present for entirety of exam.    Labs  No results found for this or any previous visit (from the past 24 hour(s)).    Assessment/Plan:      63 y.o.  here for routine gynecological exam.    CBE performed this visit. Per Hem/Onc, breast cancer surveillance via history and physical exam 1-4 times per year for 5 year (through 2027), then annually with no role for surveillance mammography s/p bilateral mastectomy.  Pap smear NILM/hrHPV- in 22, now s/p TLH/RRBSO/Cystoscopy in 2023 with no indication for vaginal pap smears  Colonoscopy up to date  Bone density up to date  Tests ordered this visit: none  Patient counseled on diet/exercies and healthy lifestyle changes. All questions answered.  RTC in 1 year for annual exam    Future Appointments   Date Time Provider Department Center   2024 12:00 PM PROVIDER, [facility] BREAST SURGERY [facility] BRESUR [facility]    2024  1:20 PM Giovana Sheets MD [facility] FM RES [facility]    2024  9:30 AM PROVIDER, [facility] PULMONOLOGY [facility] PULM [facility]    2024  9:00 AM NURSE, [facility] HEMATOLOGY ONCOLOGY [facility] HEMOMC [facility]    2024 10:00 AM Pepper Hopper FNP [facility] HEMOMC [facility]    2024 10:30 AM INFUSION ROOM 531, [facility] CHEMOTHERAPY INFUSION [facility] CHEMO [facility]   2025  9:30 AM Jesenia Howe, ANP [facility] GYN [facility]     Patient and plan discussed with Dr. Brooks.      Charline Mann MD  [facility], PGY-3

## 2024-07-11 ENCOUNTER — OFFICE VISIT (OUTPATIENT)
Dept: SURGERY | Facility: CLINIC | Age: 64
End: 2024-07-11
Payer: MEDICAID

## 2024-07-11 VITALS
SYSTOLIC BLOOD PRESSURE: 119 MMHG | WEIGHT: 128 LBS | HEART RATE: 79 BPM | BODY MASS INDEX: 22.68 KG/M2 | OXYGEN SATURATION: 100 % | HEIGHT: 63 IN | RESPIRATION RATE: 18 BRPM | DIASTOLIC BLOOD PRESSURE: 83 MMHG

## 2024-07-11 DIAGNOSIS — Z90.11 S/P RIGHT MASTECTOMY: Primary | ICD-10-CM

## 2024-07-11 PROCEDURE — 99213 OFFICE O/P EST LOW 20 MIN: CPT | Mod: PBBFAC

## 2024-07-11 NOTE — PROGRESS NOTES
[facility] GENERAL SURGERY   Clinic Note       CC: Double Mastectomy 6 month FU       HPI: Cookie Ugalde is a 63 y.o. female  has a past medical history of Malignant neoplasm of right breast in female, estrogen receptor negative, Personal history of colonic polyps (08/30/2023), Primary hypertension, and Vitamin D deficiency. Patient presents for 6 month FU wo compliants. She is s/p R simple mastectomy with SLNB on 4/11/22 for triple negative IDC and L simple mastectomy on 12/14/22. Patient denies any pain, swelling, drainage, fatigue, weightloss, loss of appetite or n/v. Patient reports getting Zometa infusions q6months. She reports no new lumps, bumps or masses.       Physical Exam:   Vitals:    07/11/24 1200   BP: 119/83   Pulse: 79   Resp: 18      Gen: NAD, AAOx3   Eye: EOMI   CV: RR  Pulm: NWOB on RA  Breast: mastectomy incision sites fully healed, no signs of infection, erythema or edema. No lymphedema present, no masses felt on exam.  Abd: soft, non-tender, non-distended  Ext:  Move all 4 extremities.       Interval Labs:    CMP and CBC 5/3 - no concerning findings      Interval Imaging:    - none new       Interval Pathology:    - none new       A/P: Cookie Ugalde is a 63 y.o. female w history of triple negative IDC of R breast (s/p mastectomy 4/11/22) and s/p L prophylactic mastectomy on 12/14/122, BRCA positive s/p chemotherapy and who presents for 6-month follow-up. She reports she has been doing well. No issues with her mastectomy incisions. No masses or lymphadenopathy palpated on exam.       - Follow up 6 months    Johnie Cabrera DDS  [facility] M3    Addendum    Pt seen and examined with medical student. Agree with note above, edited as necessary.     Nichelle Bradshaw MD   [facility] General Surgery Resident PGY3

## 2024-07-11 NOTE — PROGRESS NOTES
[facility] GENERAL SURGERY   Clinic Note       CC: Double Mastectomy 6 month FU       HPI: Cookie Ugalde is a 63 y.o. female  has a past medical history of Malignant neoplasm of right breast in female, estrogen receptor negative, Personal history of colonic polyps (08/30/2023), Primary hypertension, and Vitamin D deficiency. Patient presents for 6 month FU wo compliants. She is s/p R simple mastectomy with SLNB on 4/11/22 for triple negative IDC and L simple mastectomy on 12/14/22. Patient denies any pain, swelling, drainage, fatigue, weightloss, loss of appetite or n/v. Patient reports getting chemo infusions q6months. She reports no new lumps, bumps or masses.       Physical Exam:   Vitals:    07/11/24 1200   BP: 119/83   Pulse: 79   Resp: 18      Gen: NAD, AAOx3   Eye: EOMI   CV: RR  Pulm: NWOB on RA  Breast: mastectomy incision sites fully healed, no signs of infection, erythema or edema. No lymphedema present, no masses felt on exam.  Abd: soft, non-tender, non-distended  Ext:  Move all 4 extremities.       Interval Labs:    CMP and CBC 5/3 - no concerning findings      Interval Imaging:    - none new       Interval Pathology:    - none new       A/P: Cookie Ugalde is a 63 y.o. female w history of triple negative IDC of R breast (s/p mastectomy 4/11/22) and s/p L prophylactic mastectomy on 12/14/122, BRCA positive s/p chemotherapy and who presents for 6-month follow-up. She reports she has been doing well. No issues with her mastectomy flaps. No masses or lymphadenopathy palpated on exam.       - Follow up 6 months    Johnie Cabrera DDS  [facility] M[facility]

## 2024-07-12 ENCOUNTER — OFFICE VISIT (OUTPATIENT)
Dept: FAMILY MEDICINE | Facility: CLINIC | Age: 64
End: 2024-07-12
Payer: MEDICAID

## 2024-07-12 VITALS
SYSTOLIC BLOOD PRESSURE: 114 MMHG | TEMPERATURE: 98 F | RESPIRATION RATE: 20 BRPM | BODY MASS INDEX: 22.36 KG/M2 | HEART RATE: 97 BPM | OXYGEN SATURATION: 99 % | HEIGHT: 63 IN | WEIGHT: 126.19 LBS | DIASTOLIC BLOOD PRESSURE: 74 MMHG

## 2024-07-12 DIAGNOSIS — I10 PRIMARY HYPERTENSION: ICD-10-CM

## 2024-07-12 DIAGNOSIS — G89.29 CHRONIC RIGHT SHOULDER PAIN: ICD-10-CM

## 2024-07-12 DIAGNOSIS — K59.00 CONSTIPATION, UNSPECIFIED CONSTIPATION TYPE: ICD-10-CM

## 2024-07-12 DIAGNOSIS — M25.511 CHRONIC RIGHT SHOULDER PAIN: ICD-10-CM

## 2024-07-12 PROBLEM — Z90.710 STATUS POST HYSTERECTOMY: Status: RESOLVED | Noted: 2023-02-07 | Resolved: 2024-07-12

## 2024-07-12 PROBLEM — N93.9 ABNORMAL UTERINE BLEEDING (AUB): Status: RESOLVED | Noted: 2023-02-08 | Resolved: 2024-07-12

## 2024-07-12 LAB
CHOLEST SERPL-MCNC: 247 MG/DL
CHOLEST/HDLC SERPL: 4 {RATIO} (ref 0–5)
EST. AVERAGE GLUCOSE BLD GHB EST-MCNC: 111.2 MG/DL
HBA1C MFR BLD: 5.5 %
HDLC SERPL-MCNC: 58 MG/DL (ref 35–60)
LDLC SERPL CALC-MCNC: 172 MG/DL (ref 50–140)
TRIGL SERPL-MCNC: 86 MG/DL (ref 37–140)
TSH SERPL-ACNC: 1.67 UIU/ML (ref 0.35–4.94)
VLDLC SERPL CALC-MCNC: 17 MG/DL

## 2024-07-12 PROCEDURE — 80061 LIPID PANEL: CPT

## 2024-07-12 PROCEDURE — 36415 COLL VENOUS BLD VENIPUNCTURE: CPT

## 2024-07-12 PROCEDURE — 99213 OFFICE O/P EST LOW 20 MIN: CPT | Mod: PBBFAC

## 2024-07-12 PROCEDURE — 83036 HEMOGLOBIN GLYCOSYLATED A1C: CPT

## 2024-07-12 PROCEDURE — 84443 ASSAY THYROID STIM HORMONE: CPT

## 2024-07-12 RX ORDER — AMLODIPINE BESYLATE 10 MG/1
10 TABLET ORAL DAILY
Qty: 90 TABLET | Refills: 1 | Status: SHIPPED | OUTPATIENT
Start: 2024-07-12 | End: 2025-01-08

## 2024-07-12 RX ORDER — POLYETHYLENE GLYCOL 3350 17 G/17G
17 POWDER, FOR SOLUTION ORAL DAILY
Qty: 1700 G | Refills: 2 | Status: SHIPPED | OUTPATIENT
Start: 2024-07-12

## 2024-07-12 RX ORDER — LIDOCAINE 50 MG/G
1 PATCH TOPICAL DAILY
Qty: 30 PATCH | Refills: 2 | Status: SHIPPED | OUTPATIENT
Start: 2024-07-12

## 2024-07-12 RX ORDER — HYDROCHLOROTHIAZIDE 12.5 MG/1
12.5 CAPSULE ORAL DAILY
Qty: 90 CAPSULE | Refills: 1 | Status: SHIPPED | OUTPATIENT
Start: 2024-07-12 | End: 2025-01-08

## 2024-07-12 NOTE — PROGRESS NOTES
"[place] OFFICE VISIT NOTE  Cookie Ugalde  [id number]  07/14/2024    Chief Complaint   Patient presents with    Medication Refill    Follow-up     Cookie Ugalde is a 63 y.o. female  presenting to [place] for hypertension follow up.     No complaints today.     Chronic Issues:   Constipation: Miralax works for her, requests refills. Adequate fiber intake.   Hypertension: amlodipine 10, HCTZ 12.5. At goal today. Denies headache, chest pain, dyspnea, palpitations, decreased urine.   Breast CA:  Breast clinic q6m. BRCA2+ status, stage IIa triple negative Breast cancer with Bilateral mastectomy 12/16/22 2/2 IDC (completed Olaparib 300 mg bid, now just on IV Zometa q6m)  ?Arthritis right shoulder: PRN lido patch. PT was ordered at one time, but patient refused saying "I can do that at home".   Lung nodule: seen on LDCT 7/31/23 5 mm nodule in the lung, follows with Pulmonology. Next CT Chest scheduled by Pulm for 8/1/24. Quit smoking 1 year ago, history of 30 pack year smoking.  The 10-year ASCVD risk score (Kourtney MANZANARES, et al., 2019) is: 6.2%    Values used to calculate the score:      Age: 63 years      Sex: Female      Is Non- : Yes      Diabetic: No      Tobacco smoker: No      Systolic Blood Pressure: 114 mmHg      Is BP treated: Yes      HDL Cholesterol: 58 mg/dL      Total Cholesterol: 247 mg/dL     FH: father and 2 brothers had prostate CA.     Maintenance:  Breast CA: see above  Pap: TLH/risk reducing BSO/cystoscopy on 2/7/23 in the setting of BRCA2+ status  Colonoscopy: 8/2023 with [facility], diverticula, transverse colon 8-9mm tubulovillous adenoma, cecum tubular adenoma. No FH of colon CA. No follow up on file, would rec 3 year follow up.   Lung CA:  see above  DEXA: 8/2/23: within normal limits. used to drink EtOH 1/2 pint whiskey for about 30 year on weekends, quit 1 year ago. No FH or personal hx of fractures. Started Zometa 11/2022, q6m with Hematology for risk reduction of " "distant mets. Taking Calcium+VT D.   Vaccine: UTD  The 10-year ASCVD risk score (Kourtney MANZANARES, et al., 2019) is: 6.2%    Values used to calculate the score:      Age: 63 years      Sex: Female      Is Non- : Yes      Diabetic: No      Tobacco smoker: No      Systolic Blood Pressure: 114 mmHg      Is BP treated: Yes      HDL Cholesterol: 58 mg/dL      Total Cholesterol: 247 mg/dL      Objective:   Blood pressure 114/74, pulse 97, temperature 98.2 °F (36.8 °C), temperature source Oral, resp. rate 20, height 5' 3" (1.6 m), weight 57.2 kg (126 lb 3.2 oz), SpO2 99%.   Physical Exam  General: appears well, in no acute distress   Eye: no scleral icterus   HENT: MMM, oropharynx without erythema/exudate   Neck: supple, no lymphadenopathy, no carotid bruits   Respiratory: clear to auscultation bilaterally, nonlabored respirations   Cardiovascular: regular rate and rhythm without murmurs or gallops, no edema in bilateral lower extremities   Gastrointestinal: soft, non-tender, non-distended, bowel sounds present   Genitourinary: no suprapubic tenderness   Musculoskeletal: no gross deformities observed. S/p bilateral mastectomy, well healed scars over bilateral chest without redness or discharge.  Integumentary: no acute rashes or skin lesions observed    Neuro: No focal lesions observed     Current Medications:   Current Outpatient Medications   Medication Sig Dispense Refill    calcium carbonate-vitamin D3 (CALCIUM 600 WITH VITAMIN D3) 600 mg-12.5 mcg (500 unit) Cap Take 1 capsule by mouth once. for 1 dose 90 capsule 1    amLODIPine (NORVASC) 10 MG tablet Take 1 tablet (10 mg total) by mouth once daily. 90 tablet 1    hydroCHLOROthiazide (MICROZIDE) 12.5 mg capsule Take 1 capsule (12.5 mg total) by mouth once daily. 90 capsule 1    LIDOcaine (LIDODERM) 5 % Place 1 patch onto the skin once daily. Remove & Discard patch within 12 hours or as directed by MD 30 patch 2    polyethylene glycol (MIRALAX) 17 " gram/dose powder Take 17 g by mouth once daily. 1700 g 2    vitamin D (VITAMIN D3) 1000 units Tab Take 1,000 Units by mouth once daily.       No current facility-administered medications for this visit.       Assessment:   1. Constipation, unspecified constipation type  - polyethylene glycol (MIRALAX) 17 gram/dose powder; Take 17 g by mouth once daily.  Dispense: 1700 g; Refill: 2    2. Chronic right shoulder pain  - LIDOcaine (LIDODERM) 5 %; Place 1 patch onto the skin once daily. Remove & Discard patch within 12 hours or as directed by MD  Dispense: 30 patch; Refill: 2    3. Primary hypertension  - Lipid Panel  - Hemoglobin A1C  - TSH  - hydroCHLOROthiazide (MICROZIDE) 12.5 mg capsule; Take 1 capsule (12.5 mg total) by mouth once daily.  Dispense: 90 capsule; Refill: 1  - amLODIPine (NORVASC) 10 MG tablet; Take 1 tablet (10 mg total) by mouth once daily.  Dispense: 90 tablet; Refill: 1      Plan:  - BP well controlled, continue current doses of amlodipine, HCTZ  - routine labs today as below  - Miralax for constipation. Discussed importance of ample hydration and fiber intake  - ASCVD risk 7.4%, no DM no CV events, but borderline based on numbers from 2023. Lipid panel today (fasting), and if >7.5%, will consider starting medium intensity statin.  - colonoscopy pathology report with 8-9mm tubulovillous adenoma on transverse colon, but no recommended repeat colonoscopy date. Will need to reach out to Dr. Ye's office for recs, but would consider 3 year follow up colonoscopy according to guidelines.    Orders Placed This Encounter    Lipid Panel    Hemoglobin A1C    TSH    polyethylene glycol (MIRALAX) 17 gram/dose powder    LIDOcaine (LIDODERM) 5 %    hydroCHLOROthiazide (MICROZIDE) 12.5 mg capsule    amLODIPine (NORVASC) 10 MG tablet       Return to clinic in 3 months for HTN follow up, or sooner if needed.     Giovana Sheets  [facility] Resident

## 2024-08-01 ENCOUNTER — HOSPITAL ENCOUNTER (OUTPATIENT)
Dept: RADIOLOGY | Facility: HOSPITAL | Age: 64
Discharge: HOME | End: 2024-08-01
Attending: INTERNAL MEDICINE
Payer: MEDICAID

## 2024-08-01 DIAGNOSIS — R91.1 SOLITARY PULMONARY NODULE: ICD-10-CM

## 2024-08-01 PROCEDURE — 71250 CT THORAX DX C-: CPT | Mod: TC

## 2024-08-21 ENCOUNTER — TELEPHONE (OUTPATIENT)
Dept: PULMONOLOGY | Facility: CLINIC | Age: 64
End: 2024-08-21
Payer: MEDICAID

## 2024-08-21 DIAGNOSIS — R91.1 SOLITARY PULMONARY NODULE: Primary | ICD-10-CM

## 2024-08-21 NOTE — TELEPHONE ENCOUNTER
"Dr. Villafuerte reviewed CT results from 08/02/2024. Stated "CT is stable. She will need to follow up in 1 year with repeat CT chest without contrast." Understanding voiced.    Called and spoke with patient in regards to results and recommendations per Dr. Villafuerte. Patient voiced understanding and is in agreement. Appt for 08/26/2024 will be cancelled at this time and will be scheduled for 08/2025.  "

## 2024-10-24 ENCOUNTER — OFFICE VISIT (OUTPATIENT)
Dept: FAMILY MEDICINE | Facility: CLINIC | Age: 64
End: 2024-10-24
Payer: MEDICAID

## 2024-10-24 VITALS
WEIGHT: 128.38 LBS | DIASTOLIC BLOOD PRESSURE: 78 MMHG | TEMPERATURE: 98 F | HEART RATE: 95 BPM | SYSTOLIC BLOOD PRESSURE: 138 MMHG | BODY MASS INDEX: 22.75 KG/M2 | OXYGEN SATURATION: 99 % | HEIGHT: 63 IN

## 2024-10-24 DIAGNOSIS — M25.511 CHRONIC RIGHT SHOULDER PAIN: ICD-10-CM

## 2024-10-24 DIAGNOSIS — Z23 NEED FOR VACCINATION: ICD-10-CM

## 2024-10-24 DIAGNOSIS — K59.00 CONSTIPATION, UNSPECIFIED CONSTIPATION TYPE: ICD-10-CM

## 2024-10-24 DIAGNOSIS — I10 PRIMARY HYPERTENSION: Primary | ICD-10-CM

## 2024-10-24 DIAGNOSIS — G89.29 CHRONIC RIGHT SHOULDER PAIN: ICD-10-CM

## 2024-10-24 DIAGNOSIS — E78.5 HYPERLIPIDEMIA, UNSPECIFIED HYPERLIPIDEMIA TYPE: ICD-10-CM

## 2024-10-24 PROCEDURE — 99214 OFFICE O/P EST MOD 30 MIN: CPT | Mod: PBBFAC

## 2024-10-24 RX ORDER — LIDOCAINE 50 MG/G
1 PATCH TOPICAL DAILY
Qty: 30 PATCH | Refills: 2 | Status: SHIPPED | OUTPATIENT
Start: 2024-10-24

## 2024-10-24 RX ORDER — POLYETHYLENE GLYCOL 3350 17 G/17G
17 POWDER, FOR SOLUTION ORAL DAILY
Qty: 1700 G | Refills: 2 | Status: SHIPPED | OUTPATIENT
Start: 2024-10-24

## 2024-10-24 RX ORDER — ATORVASTATIN CALCIUM 10 MG/1
10 TABLET, FILM COATED ORAL DAILY
Qty: 90 TABLET | Refills: 3 | Status: SHIPPED | OUTPATIENT
Start: 2024-10-24 | End: 2025-10-24

## 2024-10-24 NOTE — PROGRESS NOTES
"[facility] OFFICE VISIT NOTE  Cookie Ugalde  [id number]  10/24/2024         Chief Complaint   Patient presents for refill of her Mirilax and lidocaine 5% patches.      Cookie Ugalde is a 64 y.o. female  presenting to [facility] for hypertension follow up and medication refills. She would like her flu vaccine today.    No complaints today.      Chronic Issues:   Constipation: Miralax works for her, requests refills. Adequate fiber intake. She reports she takes two cap fulls of Mirilax every 2-3 days. She has bowel movements every day. Denies straining and denies any blood in stool. She reports she eats rice, meats, vegetables, and fruits. She will eat any vegetable at least twice a day. She only drinks 2 bottles of water a day. She walks around the block 15min nearly everyday.     Hypertension: amlodipine 10, HCTZ 12.5. At goal today. She denies checking her blood pressure at home. Her blood pressure in clinic today is 138/78. She reports she takes her amlodipine and hydrochlorothiazide every day as prescribed. She denies headaches, vision changes, chest pain, shortness breath, LE edema.     Breast CA:  Breast clinic q6m. BRCA2+ status, stage IIa triple negative Breast cancer with Bilateral mastectomy 12/16/22 2/2 IDC (completed Olaparib 300 mg bid, now just on IV Zometa q6m)    ?Arthritis right shoulder: PRN lido patch, would like refill today. PT was ordered at one time, but patient refused saying "I can do that at home".     Lung nodule: seen on LDCT 7/31/23 5 mm nodule in the lung, follows with Pulmonology. Next CT Chest scheduled by Pulm for 8/1/24, results of CT stated  "stable". They stated she will need a follow up in 1 year for repeat CT chest without contrast. Quit smoking 1 year ago, history of 30 pack year smoking.    The 10-year ASCVD risk score (Kourtney MANZANARES, et al., 2019) is: 6.7%    Values used to calculate the score:      Age: 64 years      Sex: Female      Is Non- : Yes      " Diabetic: No      Tobacco smoker: No      Systolic Blood Pressure: 114 mmHg      Is BP treated: Yes      HDL Cholesterol: 58 mg/dL      Total Cholesterol: 247 mg/dL      FH: father and 2 brothers had prostate CA.      Maintenance:  Breast CA: see above  Pap: TLH/risk reducing BSO/cystoscopy on 2/7/23 in the setting of BRCA2+ status,   Colonoscopy: 8/2023 with Ye, diverticula, transverse colon 8-9mm tubulovillous adenoma, cecum tubular adenoma. No FH of colon CA. No follow up on file, would rec 3 year follow up.   Lung CA:  see above  DEXA: 8/2/23: within normal limits. used to drink EtOH 1/2 pint whiskey for about 30 year on weekends, quit 1 year ago.  Reports she drinks a glass of wine occasionally. No FH or personal hx of fractures. Started Zometa 11/2022, q6m with Hematology for risk reduction of distant mets. Taking Calcium+VT D.   Vaccine: would like flu vaccine today  The 10-year ASCVD risk score (Kourtney MANZANARES, et al., 2019) is: 6.7%    Values used to calculate the score:      Age: 64 years      Sex: Female      Is Non- : Yes      Diabetic: No      Tobacco smoker: No      Systolic Blood Pressure: 114 mmHg      Is BP treated: Yes      HDL Cholesterol: 58 mg/dL      Total Cholesterol: 247 mg/dL        Objective:   There were no vitals taken for this visit.     Physical Exam  General: appears well, in no acute distress   Eye: no scleral icterus   HENT: MMM, oropharynx without erythema/exudate   Neck: supple, no lymphadenopathy, no carotid bruits   Respiratory: clear to auscultation bilaterally, nonlabored respirations   Cardiovascular: regular rate and rhythm without murmurs or gallops, no edema in bilateral lower extremities   Gastrointestinal: soft, non-tender, non-distended, bowel sounds present   Genitourinary: no suprapubic tenderness   Musculoskeletal: no gross deformities observed. S/p bilateral mastectomy, well healed scars over bilateral chest without redness or  discharge.  Integumentary: no acute rashes or skin lesions observed    Neuro: No focal lesions observed      Current Medications:   Current Medications          Current Outpatient Medications   Medication Sig Dispense Refill    amLODIPine (NORVASC) 10 MG tablet Take 1 tablet (10 mg total) by mouth once daily. 90 tablet 1    calcium carbonate-vitamin D3 (CALCIUM 600 WITH VITAMIN D3) 600 mg-12.5 mcg (500 unit) Cap Take 1 capsule by mouth once. for 1 dose 90 capsule 1    hydroCHLOROthiazide (MICROZIDE) 12.5 mg capsule Take 1 capsule (12.5 mg total) by mouth once daily. 90 capsule 1    LIDOcaine (LIDODERM) 5 % Place 1 patch onto the skin once daily. Remove & Discard patch within 12 hours or as directed by MD 30 patch 2    polyethylene glycol (MIRALAX) 17 gram/dose powder Take 17 g by mouth once daily. 1700 g 2    vitamin D (VITAMIN D3) 1000 units Tab Take 1,000 Units by mouth once daily.          No current facility-administered medications for this visit.            Assessment:   There are no diagnoses linked to this encounter.        Plan:  - BP well controlled, continue current doses of amlodipine, HCTZ  - Miralax for constipation. Needs refill of Mirilax.Discussed importance of ample hydration and fiber intake  - received flu vaccine today   - ASCVD risk 7.4%, no DM no CV events, but borderline based on numbers from 2023. Lipid panel 7/12/24  if >7.5%, will consider starting medium intensity statin.  - colonoscopy pathology report with 8-9mm tubulovillous adenoma on transverse colon, but no recommended repeat colonoscopy date. Will need to reach out to Dr. Ye's office for recs, but would consider 3 year follow up colonoscopy according to guidelines.           Return to clinic in 3 months for HTN follow up, or sooner if needed.

## 2024-10-24 NOTE — PROGRESS NOTES
"[facility] OFFICE VISIT NOTE  Cookie Ugalde  [id number]  10/24/2024    Chief Complaint   Patient presents with    Follow-up     Myalgia     Cookie Ugalde is a 64 y.o. female  presenting to [facility] for hypertension follow up. She would like her flu vaccine today.     Acute complaint: none    Constipation: Miralax works for her, requests refills. She reports she takes two cap fulls of Miralax every 2-3 days. She has bowel movements every day. Denies straining and denies any blood in stool. Adequate fiber intake. She reports she eats rice, meats, vegetables, and fruits. She will eat any vegetable at least twice a day. She only drinks 2 bottles (16 oz each) of water a day. She walks around the block 15min nearly everyday.      Hypertension: amlodipine 10, HCTZ 12.5, reports compliance. At goal today. She denies checking her blood pressure at home. Her blood pressure in clinic today is 138/78. She denies headaches, vision changes, chest pain, shortness breath, LE edema.     Problem List:   ?Arthritis right shoulder: PRN lido patch, would like refill today. PT was ordered at one time, but patient refused saying "I can do that at home".   Breast CA:  Breast clinic q6m. BRCA2+ status, stage IIa triple negative Breast cancer with Bilateral mastectomy 12/16/22 2/2 IDC (completed Olaparib 300 mg bid, now just on IV Zometa q6m)  Lung nodule: seen on LDCT 7/31/23 5 mm nodule in the lung, follows with Pulmonology. Recent CT Chest 8/1/24 No detrimental change. Quit smoking 2022, history of 30 pack year smoking.      FH: father and 2 brothers had prostate CA.     Maintenance:  Breast CA: see above  Pap: TLH/risk reducing BSO/cystoscopy on 2/7/23 in the setting of BRCA2+ status  Colonoscopy: 8/2023 with [facility], diverticula, transverse colon 8-9mm tubulovillous adenoma, cecum tubular adenoma. No FH of colon CA. No follow up on file, would rec 3 year follow up.   Lung CA:  see above  DEXA: 8/2/23: within normal limits. used to " "drink EtOH 1/2 pint whiskey for about 30 year on weekends, quit 1 year ago. No FH or personal hx of fractures. Started Zometa 11/2022, q6m with Hematology for risk reduction of distant mets. Taking Calcium+VT D.   Vaccine: Due for Flu  The 10-year ASCVD risk score (Kourtney MANZANARES, et al., 2019) is: 11.1%    Values used to calculate the score:      Age: 64 years      Sex: Female      Is Non- : Yes      Diabetic: No      Tobacco smoker: No      Systolic Blood Pressure: 138 mmHg      Is BP treated: Yes      HDL Cholesterol: 58 mg/dL      Total Cholesterol: 247 mg/dL      Objective:   Blood pressure 138/78, pulse 95, temperature 97.9 °F (36.6 °C), temperature source Oral, height 5' 3" (1.6 m), weight 58.2 kg (128 lb 6.4 oz), SpO2 99%.   Physical Exam  General: appears well, in no acute distress   Eye: no scleral icterus   HENT: MMM, oropharynx without erythema/exudate   Neck: supple, no lymphadenopathy, no carotid bruits   Respiratory: clear to auscultation bilaterally, nonlabored respirations   Cardiovascular: regular rate and rhythm without murmurs or gallops, no edema in bilateral lower extremities   Gastrointestinal: soft, non-tender, non-distended, bowel sounds present   Genitourinary: no suprapubic tenderness   Musculoskeletal: no gross deformities observed.   Integumentary: no acute rashes or skin lesions observed    Neuro: No focal lesions observed     Current Medications:   Current Outpatient Medications   Medication Sig Dispense Refill    amLODIPine (NORVASC) 10 MG tablet Take 1 tablet (10 mg total) by mouth once daily. 90 tablet 1    atorvastatin (LIPITOR) 10 MG tablet Take 1 tablet (10 mg total) by mouth once daily. 90 tablet 3    calcium carbonate-vitamin D3 (CALCIUM 600 WITH VITAMIN D3) 600 mg-12.5 mcg (500 unit) Cap Take 1 capsule by mouth once. for 1 dose 90 capsule 1    hydroCHLOROthiazide (MICROZIDE) 12.5 mg capsule Take 1 capsule (12.5 mg total) by mouth once daily. 90 capsule 1    " LIDOcaine (LIDODERM) 5 % Place 1 patch onto the skin once daily. Remove & Discard patch within 12 hours or as directed by MD 30 patch 2    polyethylene glycol (MIRALAX) 17 gram/dose powder Take 17 g by mouth once daily. 1700 g 2    vitamin D (VITAMIN D3) 1000 units Tab Take 1,000 Units by mouth once daily.       No current facility-administered medications for this visit.       Assessment:   1. Primary hypertension    2. Hyperlipidemia, unspecified hyperlipidemia type    3. Constipation, unspecified constipation type    4. Chronic right shoulder pain    5. Need for vaccination  - influenza (Flulaval, Fluzone, Fluarix) 45 mcg/0.5 mL IM vaccine (> or = 6 mo) 0.5 mL    Plan:  - BP well controlled, continue current doses of amlodipine, HCTZ.  Low Sodium Diet (DASH Diet - Less than 2 grams of sodium per day, high in fruits, vegetables, potassium, calcium, and magnesium; low in fat).  Monitor blood pressure daily and log. BP logs provided. Report consistent numbers greater than 140/90.  Smoking cessation encouraged to aid in BP reduction.  Maintain healthy weight with goal BMI <30. Exercise 30 minutes per day, 5 days per week.   Limit alcohol consumption to 2 drinks or less per day for men and 1 drink or less per day for women  - refill Miralax. Discussed importance of ample hydration and fiber intake  - Reviewed ASCVD risk with patient. After discussion, patient agrees to START atorvastatin 10 qd. Reports previously on statin, tolerated well. Rec CoQ10 OTC qd if myalgias/fatigue.       Orders Placed This Encounter    influenza (Flulaval, Fluzone, Fluarix) 45 mcg/0.5 mL IM vaccine (> or = 6 mo) 0.5 mL    polyethylene glycol (MIRALAX) 17 gram/dose powder    LIDOcaine (LIDODERM) 5 %    atorvastatin (LIPITOR) 10 MG tablet         Return to clinic in 3 months for HTN follow up, or sooner if needed.     Giovana Sheets  [facility] Resident

## 2024-10-24 NOTE — PATIENT INSTRUCTIONS
You may start Coenzyme Q10 supplement daily if you experience muscle pains/fatigue with cholesterol pills.

## 2024-10-25 ENCOUNTER — TELEPHONE (OUTPATIENT)
Dept: FAMILY MEDICINE | Facility: CLINIC | Age: 64
End: 2024-10-25
Payer: MEDICAID

## 2024-10-25 NOTE — TELEPHONE ENCOUNTER
----- Message from Shayna sent at 10/25/2024 12:32 PM CDT -----  Regarding: Lidocaine Patches  Patient stated the pharmacy is unable to give her the Lidocaine patches - we need to call [facility].

## 2024-10-29 DIAGNOSIS — G89.29 CHRONIC RIGHT SHOULDER PAIN: Primary | ICD-10-CM

## 2024-10-29 DIAGNOSIS — M25.511 CHRONIC RIGHT SHOULDER PAIN: Primary | ICD-10-CM

## 2024-10-29 RX ORDER — LIDOCAINE 50 MG/G
1 PATCH TOPICAL DAILY
Qty: 30 PATCH | Refills: 2 | Status: SHIPPED | OUTPATIENT
Start: 2024-10-29

## 2024-10-30 DIAGNOSIS — C50.919 TRIPLE NEGATIVE MALIGNANT NEOPLASM OF BREAST: Primary | ICD-10-CM

## 2024-10-30 DIAGNOSIS — Z17.421 TRIPLE NEGATIVE MALIGNANT NEOPLASM OF BREAST: Primary | ICD-10-CM

## 2024-11-01 ENCOUNTER — TELEPHONE (OUTPATIENT)
Dept: HEMATOLOGY/ONCOLOGY | Facility: CLINIC | Age: 64
End: 2024-11-01
Payer: MEDICAID

## 2024-11-04 ENCOUNTER — APPOINTMENT (OUTPATIENT)
Dept: HEMATOLOGY/ONCOLOGY | Facility: CLINIC | Age: 64
End: 2024-11-04
Payer: MEDICAID

## 2024-11-04 ENCOUNTER — INFUSION (OUTPATIENT)
Dept: INFUSION THERAPY | Facility: HOSPITAL | Age: 64
End: 2024-11-04
Attending: INTERNAL MEDICINE
Payer: MEDICAID

## 2024-11-04 ENCOUNTER — OFFICE VISIT (OUTPATIENT)
Dept: HEMATOLOGY/ONCOLOGY | Facility: CLINIC | Age: 64
End: 2024-11-04
Payer: MEDICAID

## 2024-11-04 VITALS
DIASTOLIC BLOOD PRESSURE: 88 MMHG | OXYGEN SATURATION: 100 % | HEART RATE: 62 BPM | TEMPERATURE: 98 F | RESPIRATION RATE: 20 BRPM | SYSTOLIC BLOOD PRESSURE: 139 MMHG

## 2024-11-04 VITALS
DIASTOLIC BLOOD PRESSURE: 80 MMHG | HEIGHT: 63 IN | TEMPERATURE: 98 F | WEIGHT: 131 LBS | OXYGEN SATURATION: 100 % | SYSTOLIC BLOOD PRESSURE: 139 MMHG | BODY MASS INDEX: 23.21 KG/M2 | RESPIRATION RATE: 20 BRPM | HEART RATE: 79 BPM

## 2024-11-04 DIAGNOSIS — Z15.01 BRCA2 GENE MUTATION POSITIVE: ICD-10-CM

## 2024-11-04 DIAGNOSIS — Z17.421 TRIPLE NEGATIVE MALIGNANT NEOPLASM OF BREAST: Primary | ICD-10-CM

## 2024-11-04 DIAGNOSIS — C50.911 INFILTRATING DUCTAL CARCINOMA OF RIGHT BREAST: ICD-10-CM

## 2024-11-04 DIAGNOSIS — Z15.09 BRCA2 GENE MUTATION POSITIVE: ICD-10-CM

## 2024-11-04 DIAGNOSIS — Z90.13 H/O BILATERAL MASTECTOMY: ICD-10-CM

## 2024-11-04 DIAGNOSIS — C50.919 TRIPLE NEGATIVE MALIGNANT NEOPLASM OF BREAST: ICD-10-CM

## 2024-11-04 DIAGNOSIS — C50.919 TRIPLE NEGATIVE MALIGNANT NEOPLASM OF BREAST: Primary | ICD-10-CM

## 2024-11-04 DIAGNOSIS — Z17.421 TRIPLE NEGATIVE MALIGNANT NEOPLASM OF BREAST: ICD-10-CM

## 2024-11-04 LAB
ALBUMIN SERPL-MCNC: 4.2 G/DL (ref 3.4–4.8)
ALBUMIN/GLOB SERPL: 1.1 RATIO (ref 1.1–2)
ALP SERPL-CCNC: 70 UNIT/L (ref 40–150)
ALT SERPL-CCNC: 17 UNIT/L (ref 0–55)
ANION GAP SERPL CALC-SCNC: 9 MEQ/L
AST SERPL-CCNC: 20 UNIT/L (ref 5–34)
BASOPHILS # BLD AUTO: 0.05 X10(3)/MCL
BASOPHILS NFR BLD AUTO: 0.6 %
BILIRUB SERPL-MCNC: 0.5 MG/DL
BUN SERPL-MCNC: 16.3 MG/DL (ref 9.8–20.1)
CALCIUM SERPL-MCNC: 10.3 MG/DL (ref 8.4–10.2)
CHLORIDE SERPL-SCNC: 107 MMOL/L (ref 98–107)
CO2 SERPL-SCNC: 27 MMOL/L (ref 23–31)
CREAT SERPL-MCNC: 0.94 MG/DL (ref 0.55–1.02)
CREAT/UREA NIT SERPL: 17
EOSINOPHIL # BLD AUTO: 0.16 X10(3)/MCL (ref 0–0.9)
EOSINOPHIL NFR BLD AUTO: 2 %
ERYTHROCYTE [DISTWIDTH] IN BLOOD BY AUTOMATED COUNT: 12.8 % (ref 11.5–17)
GFR SERPLBLD CREATININE-BSD FMLA CKD-EPI: >60 ML/MIN/1.73/M2
GLOBULIN SER-MCNC: 3.8 GM/DL (ref 2.4–3.5)
GLUCOSE SERPL-MCNC: 97 MG/DL (ref 82–115)
HCT VFR BLD AUTO: 41.5 % (ref 37–47)
HGB BLD-MCNC: 13.5 G/DL (ref 12–16)
IMM GRANULOCYTES # BLD AUTO: 0.02 X10(3)/MCL (ref 0–0.04)
IMM GRANULOCYTES NFR BLD AUTO: 0.2 %
LYMPHOCYTES # BLD AUTO: 2.22 X10(3)/MCL (ref 0.6–4.6)
LYMPHOCYTES NFR BLD AUTO: 27.2 %
MAGNESIUM SERPL-MCNC: 2.2 MG/DL (ref 1.6–2.6)
MCH RBC QN AUTO: 30.5 PG (ref 27–31)
MCHC RBC AUTO-ENTMCNC: 32.5 G/DL (ref 33–36)
MCV RBC AUTO: 93.7 FL (ref 80–94)
MONOCYTES # BLD AUTO: 0.58 X10(3)/MCL (ref 0.1–1.3)
MONOCYTES NFR BLD AUTO: 7.1 %
NEUTROPHILS # BLD AUTO: 5.12 X10(3)/MCL (ref 2.1–9.2)
NEUTROPHILS NFR BLD AUTO: 62.9 %
NRBC BLD AUTO-RTO: 0 %
PLATELET # BLD AUTO: 346 X10(3)/MCL (ref 130–400)
PMV BLD AUTO: 9.1 FL (ref 7.4–10.4)
POTASSIUM SERPL-SCNC: 4.3 MMOL/L (ref 3.5–5.1)
PROT SERPL-MCNC: 8 GM/DL (ref 5.8–7.6)
RBC # BLD AUTO: 4.43 X10(6)/MCL (ref 4.2–5.4)
SODIUM SERPL-SCNC: 143 MMOL/L (ref 136–145)
WBC # BLD AUTO: 8.15 X10(3)/MCL (ref 4.5–11.5)

## 2024-11-04 PROCEDURE — 83735 ASSAY OF MAGNESIUM: CPT

## 2024-11-04 PROCEDURE — 36415 COLL VENOUS BLD VENIPUNCTURE: CPT

## 2024-11-04 PROCEDURE — 85025 COMPLETE CBC W/AUTO DIFF WBC: CPT

## 2024-11-04 PROCEDURE — 99214 OFFICE O/P EST MOD 30 MIN: CPT | Mod: PBBFAC | Performed by: NURSE PRACTITIONER

## 2024-11-04 PROCEDURE — 80053 COMPREHEN METABOLIC PANEL: CPT

## 2024-11-04 NOTE — PROGRESS NOTES
Problem List:   IDC Right Breast, Triple Negative; follow up     Current Treatment:  Zometa IV Q 6 months   Started 2022    Treatment History:  -Mediport placed on 2022  -AC x4 cycles (2022-2022)  -DD Taxol Q2 weeks x4 cycles (2022-2022)   -mediport removal 2023  Olaparib 2022-2023    Past medical history: Tobacco abuse. Hypertension. Arthritis of right shoulder.  Procedure/surgical history:  Umbilical hernia repair .  ABHINAV BSO 2022-S/p Left mastectomy and mediport removed   Social history: . Lives in [place], [place]. Has 4 children. Works as a  in a [employer] Has been smoking 5 cigarettes daily for 15 years. Drinks half a pint of liquor over the weekend. No illicit drugs  Menstrual and OB/GYN history: Menarche, age 14. Menopause, late 40s. First child at age 26 years. No abortions or miscarriages. Used birth control pills for 8 years. Did not use hormone replacement therapy after menopause. Breast-fed 1 child for 7 months.  Family history:  2 brothers experienced prostate cancer in their 50s  Father  from prostate cancer in his 60s-70s  No family history of breast cancer.    History of Present Illness:  62 -year-old female referred from surgery clinic, with newly diagnosed right breast cancer.  She never palpated breast mass. She noticed breast/chest wall pain after extensive muscle use in 2021. At that time, she was diagnosed with musculoskeletal pain. In January, she went back to emergency department when symptoms did not resolve. At that time, physical examination apparently noted irregularity in breast and she was referred for further imaging and biopsy revealing for triple negative breast cancer.     Interval history 2024: Patient presents today alone for scheduled breast cancer surveillance visit. She is due to receive Zometa infusion today. Patient report doing well good appetite and energy level.  Patient denies any unintentional weight loss, N/V/D/C, unusual headache, abdominal pain, chest pain, dyspnea, blood in urine/stool, changes in bowel/bladder patterns, lymphedema, fever or frequent infection. Patient does admit to chronic  but stable tenderness at mastectomy sites. Lab work reviewed with patient, stable. Discussed follow up appointments with patient.     Review of Systems   All other systems reviewed and are negative.      Physical Exam  Constitutional:       Appearance: Normal appearance.   HENT:      Head: Normocephalic.      Mouth/Throat:      Mouth: Mucous membranes are moist.   Eyes:      Pupils: Pupils are equal, round, and reactive to light.   Cardiovascular:      Rate and Rhythm: Normal rate and regular rhythm.      Pulses: Normal pulses.      Heart sounds: Normal heart sounds.   Pulmonary:      Effort: Pulmonary effort is normal.      Breath sounds: Normal breath sounds.   Chest:   Breasts:     Right: Absent.      Left: Absent.   Abdominal:      General: Bowel sounds are normal.      Palpations: Abdomen is soft.   Musculoskeletal:         General: Normal range of motion.      Cervical back: Normal range of motion.   Skin:     General: Skin is warm and dry.      Capillary Refill: Capillary refill takes less than 2 seconds.   Neurological:      General: No focal deficit present.      Mental Status: She is alert and oriented to person, place, and time.   Psychiatric:         Attention and Perception: Attention normal.         Mood and Affect: Affect normal.         Speech: Speech normal.         Behavior: Behavior normal.         Thought Content: Thought content normal.       Vitals:    11/04/24 0906   BP: 139/80   Pulse: 79   Resp: 20   Temp: 98.1 °F (36.7 °C)       Lab Results   Component Value Date    WBC 8.15 11/04/2024    RBC 4.43 11/04/2024    HGB 13.5 11/04/2024    HCT 41.5 11/04/2024    MCV 93.7 11/04/2024    MCH 30.5 11/04/2024    MCHC 32.5 (L) 11/04/2024    RDW 12.8 11/04/2024    PLT  346 11/04/2024    MPV 9.1 11/04/2024     CMP  Sodium   Date Value Ref Range Status   11/04/2024 143 136 - 145 mmol/L Final     Potassium   Date Value Ref Range Status   11/04/2024 4.3 3.5 - 5.1 mmol/L Final     Chloride   Date Value Ref Range Status   11/04/2024 107 98 - 107 mmol/L Final     CO2   Date Value Ref Range Status   11/04/2024 27 23 - 31 mmol/L Final     Blood Urea Nitrogen   Date Value Ref Range Status   11/04/2024 16.3 9.8 - 20.1 mg/dL Final     Creatinine   Date Value Ref Range Status   11/04/2024 0.94 0.55 - 1.02 mg/dL Final     Calcium   Date Value Ref Range Status   11/04/2024 10.3 (H) 8.4 - 10.2 mg/dL Final     Albumin   Date Value Ref Range Status   11/04/2024 4.2 3.4 - 4.8 g/dL Final     Bilirubin Total   Date Value Ref Range Status   11/04/2024 0.5 <=1.5 mg/dL Final     ALP   Date Value Ref Range Status   11/04/2024 70 40 - 150 unit/L Final     AST   Date Value Ref Range Status   11/04/2024 20 5 - 34 unit/L Final     ALT   Date Value Ref Range Status   11/04/2024 17 0 - 55 unit/L Final     eGFR   Date Value Ref Range Status   11/04/2024 >60 mL/min/1.73/m2 Final     Assessment:  #IDC right breast, triple negative:  To summarize:  IDC right breast, triple negative  Right simple mastectomy 04/11/2022  2.2 cm, grade 3, no LVI, margins negative, 4 lymph nodes negative  T2 pN0 MX, G3, triple negative, AJCC anatomic stage IIA, pathological prognostic stage IIA  Postmenopausal per labs (04/28/2022)  S/P adjuvant ddAC x4, then dose dense Taxol x4 (05/30/2022-09/12/2022)   -genetic testing:  positive for deleterious mutation: BRCA2 p.* ([id number]).   -11/01/2022:  Started Zometa 4 mg IV (every 6 months x3-5 years) (for risk reduction of distant metastasis)  -11/18/2022:  Bilateral screening mammography with tomosynthesis (comparison:  03/18/2022 mammogram): Both breasts benign, BI-RADS 2 (continue annual screening mammography with digital breast tomosynthesis)    Plan:  IDC right breast, triple  negative   pT2 pN0; S/P Bilateral mastectomy   Does not need adjuvant radiotherapy       -Consider adjuvant bisphosphonate therapy for risk reduction of distant metastasis for 3-5 years in postmenopausal patients (natural or induced) with high risk node-negative or node positive tumors  -postmenopausal per labs 04/28/2022  -10/05/2022: Dental clearance obtained   -Zometa 4 mg IV every 6 months x3-5 years (until 11/2027)    -adjuvant Zometa started 11/01/2022  -11/4/24 cracked tooth need to be pulled, lost dental insurance coverage, in search of new dental services   -Hold Zometa for now until evaluated by dental----will not proceed with Zometa without updated dental clearance    -Follow up with NP in 6 months with lab work  (cbc,cmp) prior to Zometa infusion pending dental clearance     Continue breast cancer surveillance  -History and physical exam 1-4 times per year for 5 years (04/2022-04/2027), then annually;  -Mammogram every 12 months, with no clear advantage to shorter interval imaging;  -Educate, monitor, and referred for lymphedema management  -In the absence of clinical signs or symptoms suggestive of recurrent disease, there is no indication for laboratory or imaging studies for metastasis screening;  -Active lifestyle, healthy diet, limited alcohol intake, and achieving and maintaining an ideal body weight (20-25 BMI) may lead to optimal breast cancer outcomes.  >>>  -S/P Bilateral mastectomy Therefore, no role of surveillance mammography    Above discussed at length with the patient.  All questions answered.    Survivorship treatment summary and care plan discussed with patient. We reviewed her diagnosis and previous treatment that was given. Discussed plans for future follow-up and monitoring. All current medical, psychosocial and practical issues were addressed. Potential late-term side effects reviewed. Counseled on healthy lifestyle and behavior modifications that could reduce risk of recurrence.  Limit alcohol to less than one drink per day, Exercise at least 150 minutes per week of moderate intensity aerobic activity or at least 75 minutes of vigorous activity, Maintaining a healthy weight, Limit red meat to no more than 2-3x per week, Reduce processed foods and meat. Also encouraged wide variety of fruits and vegetables, specifically cruciferous vegetables.    I spent a total of 15 minutes on the day of the visit.This includes face to face time and non-face to face time preparing to see the patient (eg, review of tests), obtaining and/or reviewing separately obtained history, documenting clinical information in the electronic or other health record, independently interpreting results and communicating results to the patient/family/caregiver, or care coordinator.  Patient states understanding with no further questions.   -----------------

## 2024-11-04 NOTE — NURSING
"Zometa HELD, until new dental clearance    Pt did labs, saw A Ward FNP, and came to infusion. Pt denies jaw pain. Notified Ward FNP pt "reports she has a crack in her tooth that has to get it pulled. She is in the process of switching dentists that accepts her insurance. Do you want to proceed with Zometa?" Pepper states, "no hold zometa until new dental clearance"    Gave pt dental clearance paper. Pt verbalized understanding how to contact clinic and infusion after getting new dental clearance.  "

## 2025-01-09 ENCOUNTER — OFFICE VISIT (OUTPATIENT)
Dept: SURGERY | Facility: CLINIC | Age: 65
End: 2025-01-09
Payer: MEDICAID

## 2025-01-09 VITALS
WEIGHT: 133 LBS | SYSTOLIC BLOOD PRESSURE: 146 MMHG | BODY MASS INDEX: 23.57 KG/M2 | HEART RATE: 98 BPM | OXYGEN SATURATION: 98 % | HEIGHT: 63 IN | DIASTOLIC BLOOD PRESSURE: 84 MMHG | RESPIRATION RATE: 18 BRPM

## 2025-01-09 DIAGNOSIS — Z90.13 S/P MASTECTOMY, BILATERAL: Primary | ICD-10-CM

## 2025-01-09 PROCEDURE — 99214 OFFICE O/P EST MOD 30 MIN: CPT | Mod: PBBFAC

## 2025-01-09 NOTE — PROGRESS NOTES
[facility] Breast Surg  Breast Surgical Oncology  Follow-Up Patient Office Visit       Referring Provider: No ref. provider found  PCP: Giovana Sheets MD   Medical Oncologist: No care team member to display   Radiation Oncologist: No care team member to display   Other Care Providers:     Patient Care Team:  Giovana Sheets MD as PCP - General (Family Medicine)  Brett Ye MD as Consulting Physician (Gastroenterology)  Althea Mays LPN as Care Coordinator      Chief Complaint:   Chief Complaint   Patient presents with    Follow-up        Subjective:   Treatment History:  R simple mastectomy w/ SLNB (2022)  L simple mastectomy (2022)  S/p chemotherapy Olaparib  Currently q6 month Zometa IV      Interval History:  2025 - Cookie Ugalde no notable events over the last 6 months.    HPI:  Cookie Ugalde is a 64 y.o. female who presents on 2025 for evaluation of  6 month follow up . PMHx of HTN and IDC right breast triple negative s/p bilateral mastectomy (2022 R and 2022). She is currently q6 months Zometa IV. Patient reports doing and feeling well. She denies any pain, nausea, vomiting, chest pain, or shortness of breath. Patient has not been doing self examination of her breast. Denies any unintentional weight loss, abdominal pain, or abnormal drainage.         MG breast density: No breast composition recorded.     Imaging: none       Pathology: none    OB/GYN History:  Age at Menarche Onset: 14  Menopausal Status: postmenopausal, LMP: No LMP recorded. Patient is postmenopausal.  Hysterectomy/Oophorectomy: menopause, at age 40s  Hormonal birth control (duration): Yes OCP (estrogen/progesterone) for 8 years.  Pregnancy History:   Age at first live birth: 26  Hormone Replacement Therapy: No, none  Patient did breast feed 1 child for 7 months.  Patient denies nipple discharge.   Patient admits to to previous breast biopsy.   Patient has to a  personal history of breast cancer.    Other Relevant History:  Prior thoracic RT: none  Genetic testing: none  Ashkenazi Sikh descent: No    Family History:  Family History   Problem Relation Name Age of Onset    Hypertension Father      Prostate cancer Father          metastatic    Heart disease Father      Stroke Mother      Hypertension Mother      Diabetes Mother      Prostate cancer Brother Conner 50        metastatic    Prostate cancer Brother John 57    Other Daughter Julee         BRCA2 negative, [facility] 2023    Breast cancer Paternal Aunt      Breast cancer Paternal Aunt      Breast cancer Paternal Aunt      Breast cancer Paternal Aunt      Breast cancer Paternal Cousin      Ovarian cancer Neg Hx      Colon cancer Neg Hx      Uterine cancer Neg Hx          Past History:  Past Medical History:   Diagnosis Date    Malignant neoplasm of right breast in female, estrogen receptor negative     infiltrating ductal, s/p right simple mastectomy, SNL biopsy, triple neg    Personal history of colonic polyps 08/30/2023    colonoscopy    Primary hypertension     Vitamin D deficiency         Past Surgical History:   Procedure Laterality Date    COLONOSCOPY W/ BIOPSIES AND POLYPECTOMY  08/30/2023    Brett Ye MD    CYSTOSCOPY N/A 02/07/2023    Procedure: CYSTOSCOPY;  Surgeon: Joelle Lira MD;  Location: [facility];  Service: OB/GYN;  Laterality: N/A;    HERNIA REPAIR      HYSTERECTOMY, TOTAL, LAPAROSCOPIC, WITH SALPINGO-OOPHORECTOMY N/A 02/07/2023    Procedure: HYSTERECTOMY,TOTAL,LAPAROSCOPIC,WITH SALPINGO-OOPHORECTOMY;  Surgeon: Joelle Lira MD;  Location: [facility];  Service: OB/GYN;  Laterality: N/A;  enseal and luken's trap    MEDIPORT INSERTION, SINGLE  05/23/2022    REMOVAL OF VASCULAR ACCESS PORT Left 12/14/2022    Procedure: REMOVAL, VASCULAR ACCESS PORT;  Surgeon: Sofie Powers MD;  Location: [facility];  Service: General;  Laterality: Left;    SIMPLE MASTECTOMY Left 12/14/2022    Procedure: MASTECTOMY,  SIMPLE;  Surgeon: Sofie Powers MD;  Location: [facility];  Service: General;  Laterality: Left;    SIMPLE MASTECTOMY W/ SENTINEL NODE BIOPSY Right 04/11/2022    UMBILICAL HERNIA REPAIR N/A 1966        Social History     Socioeconomic History    Marital status:    Tobacco Use    Smoking status: Former     Current packs/day: 0.00     Average packs/day: 0.5 packs/day for 24.0 years (12.0 ttl pk-yrs)     Types: Cigarettes     Start date: 1998     Quit date: 2022     Years since quitting: 3.0    Smokeless tobacco: Never   Substance and Sexual Activity    Alcohol use: Not Currently    Drug use: Yes     Types: Marijuana     Comment: on weekends    Sexual activity: Yes     Partners: Male     Social Drivers of Health     Transportation Needs: No Transportation Needs (4/17/2024)    PRAPARE - Transportation     Lack of Transportation (Medical): No     Lack of Transportation (Non-Medical): No   Physical Activity: Sufficiently Active (10/17/2024)    Exercise Vital Sign     Days of Exercise per Week: 4 days     Minutes of Exercise per Session: 40 min   Housing Stability: Unknown (10/17/2024)    Housing Stability Vital Sign     Unable to Pay for Housing in the Last Year: No        Body mass index is 23.56 kg/m².     Allergy/Medications:   Review of patient's allergies indicates:  No Known Allergies       Current Outpatient Medications:     atorvastatin (LIPITOR) 10 MG tablet, Take 1 tablet (10 mg total) by mouth once daily., Disp: 90 tablet, Rfl: 3    LIDOcaine (LIDODERM) 5 %, Place 1 patch onto the skin once daily. Apply 1 patch to affected area for no more than 12 hours. Remove patch, and maintain 12 hours free of lidocaine before applying the next patch. Apply a new patch for 12 of every 24 hours as needed., Disp: 30 patch, Rfl: 2    polyethylene glycol (MIRALAX) 17 gram/dose powder, Take 17 g by mouth once daily., Disp: 1700 g, Rfl: 2    vitamin D (VITAMIN D3) 1000 units Tab, Take 1,000 Units by mouth once daily., Disp:  ", Rfl:     amLODIPine (NORVASC) 10 MG tablet, Take 1 tablet (10 mg total) by mouth once daily., Disp: 90 tablet, Rfl: 1    calcium carbonate-vitamin D3 (CALCIUM 600 WITH VITAMIN D3) 600 mg-12.5 mcg (500 unit) Cap, Take 1 capsule by mouth once. for 1 dose, Disp: 90 capsule, Rfl: 1    hydroCHLOROthiazide (MICROZIDE) 12.5 mg capsule, Take 1 capsule (12.5 mg total) by mouth once daily., Disp: 90 capsule, Rfl: 1       Review of Systems:  Review of Systems   Constitutional:  Negative for chills, fever and weight loss.   HENT: Negative.     Eyes: Negative.    Respiratory:  Negative for cough and shortness of breath.    Cardiovascular:  Negative for chest pain.   Gastrointestinal:  Negative for constipation, diarrhea, nausea and vomiting.   Genitourinary: Negative.    Musculoskeletal: Negative.    Skin: Negative.    Neurological:  Negative for headaches.           Objective:     Vitals:  Blood pressure (!) 146/84, pulse 98, resp. rate 18, height 5' 3" (1.6 m), weight 60.3 kg (133 lb), SpO2 98%.      Physical Exam:  General: The patient is awake, alert and oriented times three. The patient is well nourished and in no acute distress.  Neck: There is no evidence of palpable cervical, supraclavicular or axillary adenopathy. The neck is supple. The thyroid is not enlarged.  Musculoskeletal: The patient has a normal range of motion of her bilateral upper extremities.  Chest: Examination of the chest wall fails to reveal any obvious abnormalities. Nonlabored breathing, symmetric expansion.  Breast:  Right: s/p simple Mastectomy. Examination of right breast fails to reveal any dominant masses or areas of significant focal nodularity. Examination of axilla fails to show any irregularity.  Left: s.p prophylactic Mastectomy. Examination of the left breast fails to reveal any dominant masses or areas of significant focal nodularity. Examination of axilla fails to show any irregularity.  Abdomen: The abdomen is soft, flat, nontender and " nondistended.  Integumentary: no rashes or skin lesions present  Neurologic: cranial nerves intact, no signs of peripheral neurological deficit, motor/sensory function intact          Assessment and Plan:   Cookie Ugalde is a 64 y.o F with a PMHx of primary HTN and right IDC triple negative malignant neoplasm. S/p right simple mastectomy w/ SLNB (4/11/2022). S/p left prophylactic mastectomy (12/14/2022). S/p chemotherapy Olaparib. Currently q6 month Zometa IV. No masses or lympadenopathy palpated on exam.     Cancer Staging   Infiltrating ductal carcinoma of right breast  Staging form: Breast, AJCC 8th Edition  - Pathologic stage from 4/11/2022: Stage IIA (pT2, pN0, cM0, G3, ER-, TX-, HER2-) - Signed by Derick Su MD on 6/25/2022      No diagnosis found.            Plan:     - currently doing well, no abnormal masses or areas of concern bilateral s/p mastectomy   - recommend following up in high risk breast clinic due to BRCA 2 in 6 months and yearly afterwards  - recommend following up annually with ObGyn  - return to clinic prn      All of questions were answered    Shalini Willoughby MS3  Breast Surgery    Addendum:  64 year old female with history of TN R IDC s/p R mastectomy w/ SLNB 4/11/22 & prophylactic L breast mastectomy 12/14/22 (completed chemo), BRCA 2 positive s/p hysterectomy & BSO who completed chemotherapy that presents for routine follow up. Overall the patient is doing well. Her bilateral mastectomy incisions healed very well. She has no palpable masses in the bilateral chest or axilla. She was instructed to follow up with the high risk breast clinic for annual exams. She was referred to the high risk breast clinic with follow up in 6 months. She was also advised to continue follow up with her OBGYN for yearly exams so she has twice annual clinical exams. She was given return precautions and can follow up with our breast clinic PRN. She understands the plan and her questions were  answered.    Manuel Gomez MD  [facility] PGY-5

## 2025-01-09 NOTE — PROGRESS NOTES
[facility] Breast Surg  Breast Surgical Oncology  Follow-Up Patient Office Visit       Referring Provider: No ref. provider found  PCP: Giovana Sheets MD   Medical Oncologist: No care team member to display   Radiation Oncologist: No care team member to display   Other Care Providers:     Patient Care Team:  Giovana Sheets MD as PCP - General (Family Medicine)  Brett Ye MD as Consulting Physician (Gastroenterology)  Althea Mays LPN as Care Coordinator      Chief Complaint:   Chief Complaint   Patient presents with    Follow-up        Subjective:   Treatment History:  R simple mastectomy w/ SLNB (2022)  L simple mastectomy (2022)  S/p chemotherapy Olaparib  Currently q6 month Zometa IV      Interval History:  2025 - Cookie Ugalde no notable events over the last 6 months.    HPI:  Cookie Ugalde is a 64 y.o. female who presents on 2025 for evaluation of  6 month follow up . PMHx of HTN and IDC right breast triple negative s/p bilateral mastectomy (2022 R and 2022). She is currently q6 months Zometa IV. Patient reports doing and feeling well. She denies any pain, nausea, vomiting, chest pain, or shortness of breath. Patient has not been doing self examination of her breast. Denies any unintentional weight loss, abdominal pain, or abnormal drainage.         MG breast density: No breast composition recorded.     Imaging: none       Pathology: none    OB/GYN History:  Age at Menarche Onset: 14  Menopausal Status: postmenopausal, LMP: No LMP recorded. Patient is postmenopausal.  Hysterectomy/Oophorectomy: menopause, at age 40s  Hormonal birth control (duration): Yes OCP (estrogen/progesterone) for 8 years.  Pregnancy History:   Age at first live birth: 26  Hormone Replacement Therapy: No, none  Patient did breast feed 1 child for 7 months.  Patient denies nipple discharge.   Patient admits to to previous breast biopsy.   Patient has to a  personal history of breast cancer.    Other Relevant History:  Prior thoracic RT: none  Genetic testing: none  Ashkenazi Rastafari descent: No    Family History:  Family History   Problem Relation Name Age of Onset    Hypertension Father      Prostate cancer Father          metastatic    Heart disease Father      Stroke Mother      Hypertension Mother      Diabetes Mother      Prostate cancer Brother Conner 50        metastatic    Prostate cancer Brother John 57    Other Daughter Julee         BRCA2 negative, [facility] 2023    Breast cancer Paternal Aunt      Breast cancer Paternal Aunt      Breast cancer Paternal Aunt      Breast cancer Paternal Aunt      Breast cancer Paternal Cousin      Ovarian cancer Neg Hx      Colon cancer Neg Hx      Uterine cancer Neg Hx          Past History:  Past Medical History:   Diagnosis Date    Malignant neoplasm of right breast in female, estrogen receptor negative     infiltrating ductal, s/p right simple mastectomy, SNL biopsy, triple neg    Personal history of colonic polyps 08/30/2023    colonoscopy    Primary hypertension     Vitamin D deficiency         Past Surgical History:   Procedure Laterality Date    COLONOSCOPY W/ BIOPSIES AND POLYPECTOMY  08/30/2023    Brett Ye MD    CYSTOSCOPY N/A 02/07/2023    Procedure: CYSTOSCOPY;  Surgeon: Joelle Lira MD;  Location: [facility];  Service: OB/GYN;  Laterality: N/A;    HERNIA REPAIR      HYSTERECTOMY, TOTAL, LAPAROSCOPIC, WITH SALPINGO-OOPHORECTOMY N/A 02/07/2023    Procedure: HYSTERECTOMY,TOTAL,LAPAROSCOPIC,WITH SALPINGO-OOPHORECTOMY;  Surgeon: Joelle Lira MD;  Location: [facility];  Service: OB/GYN;  Laterality: N/A;  enseal and luken's trap    MEDIPORT INSERTION, SINGLE  05/23/2022    REMOVAL OF VASCULAR ACCESS PORT Left 12/14/2022    Procedure: REMOVAL, VASCULAR ACCESS PORT;  Surgeon: Sofie Powers MD;  Location: [facility];  Service: General;  Laterality: Left;    SIMPLE MASTECTOMY Left 12/14/2022    Procedure: MASTECTOMY,  SIMPLE;  Surgeon: Sofie Powers MD;  Location: [facility];  Service: General;  Laterality: Left;    SIMPLE MASTECTOMY W/ SENTINEL NODE BIOPSY Right 04/11/2022    UMBILICAL HERNIA REPAIR N/A 1966        Social History     Socioeconomic History    Marital status:    Tobacco Use    Smoking status: Former     Current packs/day: 0.00     Average packs/day: 0.5 packs/day for 24.0 years (12.0 ttl pk-yrs)     Types: Cigarettes     Start date: 1998     Quit date: 2022     Years since quitting: 3.0    Smokeless tobacco: Never   Substance and Sexual Activity    Alcohol use: Not Currently    Drug use: Yes     Types: Marijuana     Comment: on weekends    Sexual activity: Yes     Partners: Male     Social Drivers of Health     Transportation Needs: No Transportation Needs (4/17/2024)    PRAPARE - Transportation     Lack of Transportation (Medical): No     Lack of Transportation (Non-Medical): No   Physical Activity: Sufficiently Active (10/17/2024)    Exercise Vital Sign     Days of Exercise per Week: 4 days     Minutes of Exercise per Session: 40 min   Housing Stability: Unknown (10/17/2024)    Housing Stability Vital Sign     Unable to Pay for Housing in the Last Year: No        Body mass index is 23.56 kg/m².     Allergy/Medications:   Review of patient's allergies indicates:  No Known Allergies       Current Outpatient Medications:     atorvastatin (LIPITOR) 10 MG tablet, Take 1 tablet (10 mg total) by mouth once daily., Disp: 90 tablet, Rfl: 3    LIDOcaine (LIDODERM) 5 %, Place 1 patch onto the skin once daily. Apply 1 patch to affected area for no more than 12 hours. Remove patch, and maintain 12 hours free of lidocaine before applying the next patch. Apply a new patch for 12 of every 24 hours as needed., Disp: 30 patch, Rfl: 2    polyethylene glycol (MIRALAX) 17 gram/dose powder, Take 17 g by mouth once daily., Disp: 1700 g, Rfl: 2    vitamin D (VITAMIN D3) 1000 units Tab, Take 1,000 Units by mouth once daily., Disp:  ", Rfl:     amLODIPine (NORVASC) 10 MG tablet, Take 1 tablet (10 mg total) by mouth once daily., Disp: 90 tablet, Rfl: 1    calcium carbonate-vitamin D3 (CALCIUM 600 WITH VITAMIN D3) 600 mg-12.5 mcg (500 unit) Cap, Take 1 capsule by mouth once. for 1 dose, Disp: 90 capsule, Rfl: 1    hydroCHLOROthiazide (MICROZIDE) 12.5 mg capsule, Take 1 capsule (12.5 mg total) by mouth once daily., Disp: 90 capsule, Rfl: 1       Review of Systems:  Review of Systems   Constitutional:  Negative for chills, fever and weight loss.   HENT: Negative.     Eyes: Negative.    Respiratory:  Negative for cough and shortness of breath.    Cardiovascular:  Negative for chest pain.   Gastrointestinal:  Negative for constipation, diarrhea, nausea and vomiting.   Genitourinary: Negative.    Musculoskeletal: Negative.    Skin: Negative.    Neurological:  Negative for headaches.           Objective:     Vitals:  Blood pressure (!) 146/84, pulse 98, resp. rate 18, height 5' 3" (1.6 m), weight 60.3 kg (133 lb), SpO2 98%.      Physical Exam:  General: The patient is awake, alert and oriented times three. The patient is well nourished and in no acute distress.  Neck: There is no evidence of palpable cervical, supraclavicular or axillary adenopathy. The neck is supple. The thyroid is not enlarged.  Musculoskeletal: The patient has a normal range of motion of her bilateral upper extremities.  Chest: Examination of the chest wall fails to reveal any obvious abnormalities. Nonlabored breathing, symmetric expansion.  Breast:  Right: s/p simple Mastectomy. Examination of right breast fails to reveal any dominant masses or areas of significant focal nodularity. Examination of axilla fails to show any irregularity.  Left: s.p prophylactic Mastectomy. Examination of the left breast fails to reveal any dominant masses or areas of significant focal nodularity. Examination of axilla fails to show any irregularity.  Abdomen: The abdomen is soft, flat, nontender and " nondistended.  Integumentary: no rashes or skin lesions present  Neurologic: cranial nerves intact, no signs of peripheral neurological deficit, motor/sensory function intact          Assessment and Plan:   Cookie Ugalde is a 64 y.o F with a PMHx of primary HTN and right IDC triple negative malignant neoplasm. S/p right simple mastectomy w/ SLNB (4/11/2022). S/p left prophylactic mastectomy (12/14/2022). S/p chemotherapy Olaparib. Currently q6 month Zometa IV. No masses or lympadenopathy palpated on exam.    Cancer Staging   Infiltrating ductal carcinoma of right breast  Staging form: Breast, AJCC 8th Edition  - Pathologic stage from 4/11/2022: Stage IIA (pT2, pN0, cM0, G3, ER-, FL-, HER2-) - Signed by Derick Su MD on 6/25/2022      No diagnosis found.            Plan:     - currently doing well, no abnormal masses or areas of concern bilateral s/p mastectomy   - recommend following up in high risk breast clinic due to BRCA 2 in 6 months and yearly afterwards  - recommend following up annually with ObGyn  - return to clinic prn      All of questions were answered    Shalini Willougbhy MS3  Breast Surgery

## 2025-01-24 ENCOUNTER — OFFICE VISIT (OUTPATIENT)
Dept: FAMILY MEDICINE | Facility: CLINIC | Age: 65
End: 2025-01-24
Payer: MEDICAID

## 2025-01-24 VITALS
BODY MASS INDEX: 23.32 KG/M2 | TEMPERATURE: 97 F | OXYGEN SATURATION: 100 % | HEART RATE: 83 BPM | DIASTOLIC BLOOD PRESSURE: 80 MMHG | SYSTOLIC BLOOD PRESSURE: 130 MMHG | HEIGHT: 63 IN | WEIGHT: 131.63 LBS

## 2025-01-24 DIAGNOSIS — G89.29 CHRONIC RIGHT SHOULDER PAIN: ICD-10-CM

## 2025-01-24 DIAGNOSIS — I10 PRIMARY HYPERTENSION: Primary | ICD-10-CM

## 2025-01-24 DIAGNOSIS — E78.5 HYPERLIPIDEMIA, UNSPECIFIED HYPERLIPIDEMIA TYPE: Chronic | ICD-10-CM

## 2025-01-24 DIAGNOSIS — M25.511 CHRONIC RIGHT SHOULDER PAIN: ICD-10-CM

## 2025-01-24 LAB
OHS QRS DURATION: 74 MS
OHS QTC CALCULATION: 487 MS

## 2025-01-24 PROCEDURE — 99214 OFFICE O/P EST MOD 30 MIN: CPT | Mod: PBBFAC,25

## 2025-01-24 PROCEDURE — 93005 ELECTROCARDIOGRAM TRACING: CPT

## 2025-01-24 RX ORDER — DICLOFENAC SODIUM 10 MG/G
2 GEL TOPICAL 2 TIMES DAILY
Qty: 450 G | Refills: 1 | Status: SHIPPED | OUTPATIENT
Start: 2025-01-24

## 2025-01-24 RX ORDER — AMLODIPINE BESYLATE 10 MG/1
10 TABLET ORAL DAILY
Qty: 90 TABLET | Refills: 1 | Status: SHIPPED | OUTPATIENT
Start: 2025-01-24 | End: 2025-07-23

## 2025-01-24 RX ORDER — ACETAMINOPHEN 500 MG
1 TABLET ORAL DAILY
Qty: 1 EACH | Refills: 0 | Status: SHIPPED | OUTPATIENT
Start: 2025-01-24

## 2025-01-24 RX ORDER — POLYETHYLENE GLYCOL 3350 17 G/17G
17 POWDER, FOR SOLUTION ORAL DAILY
Qty: 1700 G | Refills: 2 | Status: SHIPPED | OUTPATIENT
Start: 2025-01-24

## 2025-01-24 RX ORDER — BIOTIN 5 MG
1 CAPSULE ORAL ONCE
Qty: 90 CAPSULE | Refills: 1 | Status: SHIPPED | OUTPATIENT
Start: 2025-01-24 | End: 2025-01-24

## 2025-01-24 RX ORDER — LIDOCAINE 50 MG/G
1 PATCH TOPICAL DAILY
Qty: 30 PATCH | Refills: 2 | Status: SHIPPED | OUTPATIENT
Start: 2025-01-24

## 2025-01-24 NOTE — PROGRESS NOTES
"[facility] OFFICE VISIT NOTE  Cookie Ugalde  [id number]  01/24/2025    Chief Complaint   Patient presents with    F/U HTN     Cookie Ugalde is a 64 y.o. female  presenting to [facility] for routine follow up.     Acute complaint:   R shoulder pain a little worse than baseline today, has known chronic R shoulder pain. She believes it was aggravated by sleeping on it wrong last night.     Hypertension:   - Amlodipine 10, HCTZ 12.5 qd.   - initial BP in office 135/82, manual repeat 130/80. Does not measure BP at home. States mother who lives nearby has BP cuff.   - Denies headaches, vision changes, chest pain, shortness breath, LE edema.   - TTE (done prior to port-a-cath placement) 5/6/22 EF 55%, Grade I diastolic dysfunction, no pulmonary hypertension  - has had EKGs in the past, none on record  - Denies snoring, daytime fatigue, nocturia. Wakes up feeling refreshed.     Hyperlipidemia:  The 10-year ASCVD risk score (Kourtney MANZANARES, et al., 2019) is: 9.5%    Values used to calculate the score:      Age: 64 years      Sex: Female      Is Non- : Yes      Diabetic: No      Tobacco smoker: No      Systolic Blood Pressure: 130 mmHg      Is BP treated: Yes      HDL Cholesterol: 58 mg/dL      Total Cholesterol: 247 mg/dL  Last  7/2024  Highest  9/2022    Problem List:   ?Arthritis right shoulder: PRN lido patch. PT was ordered at one time, but patient refused saying "I can do that at home".   Breast CA:  Breast clinic q6m. BRCA2+ status, stage IIa triple negative Breast cancer with Bilateral mastectomy 12/16/22 2/2 IDC (completed Olaparib 300 mg bid, now just on IV Zometa q6m)  Lung nodule: seen on LDCT 7/31/23 5 mm nodule in the lung, follows with Pulmonology. Recent CT Chest 8/1/24 No detrimental change. Quit smoking 2022, history of 30 pack year smoking.  Hypertension- as above  Constipation- Miralax    FH: father and 2 brothers had prostate CA.     Maintenance:  Breast CA: see " "above  Pap: TLH/risk reducing BSO/cystoscopy on 2/7/23 in the setting of BRCA2+ status  Colonoscopy: 8/2023 with Ye, diverticula, transverse colon 8-9mm tubulovillous adenoma, cecum tubular adenoma. No FH of colon CA. No follow up on file, would rec 3 year follow up.   Lung CA:  see above  DEXA: 8/2/23: within normal limits. used to drink EtOH 1/2 pint whiskey for about 30 year on weekends, quit 1 year ago. No FH or personal hx of fractures. Started Zometa 11/2022, q6m with Hematology for risk reduction of distant mets. Taking Calcium+VT D.   Vaccine: UTD      Objective:   Vitals:    01/24/25 0921 01/24/25 1020   BP: 135/82 130/80   BP Location: Left arm Left arm   Patient Position: Sitting Sitting   Pulse: 83    Temp: 97.4 °F (36.3 °C)    TempSrc: Oral    SpO2: 100%    Weight: 59.7 kg (131 lb 9.6 oz)    Height: 5' 3" (1.6 m)        Physical Exam  General: appears well, in no acute distress   Eye: no scleral icterus   HENT: MMM, oropharynx without erythema/exudate   Neck: supple, no lymphadenopathy, no carotid bruits   Respiratory: clear to auscultation bilaterally, nonlabored respirations   Cardiovascular: regular rate and rhythm without murmurs or gallops, no edema in bilateral lower extremities   Gastrointestinal: soft, non-tender, non-distended, bowel sounds present   Genitourinary: no suprapubic tenderness   Musculoskeletal: no gross deformities observed. Shoulders full aROM bilaterally. Mild TTP over medial border of R scapula. Str 5/5 BUE.   Integumentary: no acute rashes or skin lesions observed      Current Medications:   Current Outpatient Medications   Medication Sig Dispense Refill    amLODIPine (NORVASC) 10 MG tablet Take 1 tablet (10 mg total) by mouth once daily. 90 tablet 1    atorvastatin (LIPITOR) 10 MG tablet Take 1 tablet (10 mg total) by mouth once daily. 90 tablet 3    blood pressure monitor (BLOOD PRESSURE KIT) Kit 1 each by Misc.(Non-Drug; Combo Route) route once daily. 1 each 0    " calcium carbonate-vitamin D3 (CALCIUM 600 WITH VITAMIN D3) 600 mg-12.5 mcg (500 unit) Cap Take 1 capsule by mouth once. for 1 dose 90 capsule 1    diclofenac sodium (VOLTAREN) 1 % Gel Apply 2 g topically 2 (two) times daily. 450 g 1    hydroCHLOROthiazide (MICROZIDE) 12.5 mg capsule Take 1 capsule (12.5 mg total) by mouth once daily. 90 capsule 1    LIDOcaine (LIDODERM) 5 % Place 1 patch onto the skin once daily. Apply 1 patch to affected area for no more than 12 hours. Remove patch, and maintain 12 hours free of lidocaine before applying the next patch. Apply a new patch for 12 of every 24 hours as needed. 30 patch 2    polyethylene glycol (MIRALAX) 17 gram/dose powder Take 17 g by mouth once daily. 1700 g 2    vitamin D (VITAMIN D3) 1000 units Tab Take 1,000 Units by mouth once daily.       No current facility-administered medications for this visit.       Assessment:   1. Primary hypertension  - IN OFFICE EKG 12-LEAD (to [place])  - initial BP above goal, then manual repeat is exactly 130/80. Return in 2 weeks with BP logs. BP cuff ordered today. May need to increase HCTZ to 25 qd if persistently elevated.   - obtain baseline EKG today.   - would hold off on screening for ALBINA. Denies snoring.     2. Chronic right shoulder pain  - X-ray Shoulder 2 or More Views Right; Future  - lido patch refilled. Also start Voltaren gel PRN.  - printout for shoulder exercises given.  - not interested in PT.     3. Hyperlipidemia, unspecified hyperlipidemia type  - discussed current guidelines for statin therapy. Documented LDL close to but under LDL of 190 back in 2022, but she had had no medical care prior to that and suspect LDLs may have been higher in the past. ASCVD >10%. Currently on atorvastatin 10 qd, tolerating well. After shared MDM, patient would like to continue atorvastatin at current dose.       Orders Placed This Encounter    X-ray Shoulder 2 or More Views Right    IN OFFICE EKG 12-LEAD (to [place])    LIDOcaine  (LIDODERM) 5 %    amLODIPine (NORVASC) 10 MG tablet    calcium carbonate-vitamin D3 (CALCIUM 600 WITH VITAMIN D3) 600 mg-12.5 mcg (500 unit) Cap    polyethylene glycol (MIRALAX) 17 gram/dose powder    diclofenac sodium (VOLTAREN) 1 % Gel    blood pressure monitor (BLOOD PRESSURE KIT) Kit       Return to clinic in 2 weeks for BP check, or sooner if needed.     Giovana Sheets  [facility] Resident

## 2025-01-24 NOTE — PROGRESS NOTES
Date of encounter 01-24-25.  HTN, Chronic right shoulder pain.  Hx. of breast intraductal carcinoma; right mastectomy / left prophylactic mastectomy completed.  Colon tubulovillous adenoma (08/2023); 3 year follow-up.  Lung nodule / pulmonary clinic; CT (08/2024) no change.    Resident's note reviewed 01-24-25.  Agree with assessment; plan of care appropriate.  Professional services provided in an outpatient primary care center affiliated with a teaching institution.

## 2025-02-09 NOTE — PROGRESS NOTES
"[facility] OFFICE VISIT NOTE  Cookie Ugalde  [id number]  02/10/2025    Chief Complaint   Patient presents with    Follow-up    Hypertension     Patient here for blood pressure check, no complaints.      Cookie Ugalde is a 64 y.o. female  presenting to [facility] for BP check.     Meds: Amlodipine 10, HCTZ 12.5 qd.   Last seen 1/24/25 at which time /82, repeat 130/80. Given rx for BP cuff to start measuring BP at home. Brings BP logs today. SBPs mostly in 130s, DBPs 80-90s.   She denies headaches, vision changes, chest pain, shortness breath, LE edema.   Baseline EKG done last visit: NSR with sinus arrhythmia      Objective:   Vitals:    02/10/25 0950   BP: 123/82   BP Location: Left arm   Patient Position: Sitting   Pulse: 89   Resp: 18   Temp: 97.8 °F (36.6 °C)   TempSrc: Oral   SpO2: 100%   Weight: 60.8 kg (134 lb)   Height: 5' 3" (1.6 m)     Physical Exam  General: appears well, in no acute distress     Neck: supple, no lymphadenopathy, no carotid bruits   Respiratory: clear to auscultation bilaterally, nonlabored respirations   Cardiovascular: regular rate and rhythm without murmurs or gallops, no edema in bilateral lower extremities   Gastrointestinal: soft, non-tender, non-distended, bowel sounds present    Musculoskeletal: no gross deformities observed      Current Medications:   Current Outpatient Medications   Medication Sig Dispense Refill    amLODIPine (NORVASC) 10 MG tablet Take 1 tablet (10 mg total) by mouth once daily. 90 tablet 1    atorvastatin (LIPITOR) 10 MG tablet Take 1 tablet (10 mg total) by mouth once daily. 90 tablet 3    blood pressure monitor (BLOOD PRESSURE KIT) Kit 1 each by Misc.(Non-Drug; Combo Route) route once daily. 1 each 0    calcium carbonate-vitamin D3 (CALCIUM 600 WITH VITAMIN D3) 600 mg-12.5 mcg (500 unit) Cap Take 1 capsule by mouth once. for 1 dose 90 capsule 1    diclofenac sodium (VOLTAREN) 1 % Gel Apply 2 g topically 2 (two) times daily. 450 g 1    " hydroCHLOROthiazide (MICROZIDE) 12.5 mg capsule Take 1 capsule (12.5 mg total) by mouth once daily. 90 capsule 1    LIDOcaine (LIDODERM) 5 % Place 1 patch onto the skin once daily. Apply 1 patch to affected area for no more than 12 hours. Remove patch, and maintain 12 hours free of lidocaine before applying the next patch. Apply a new patch for 12 of every 24 hours as needed. 30 patch 2    polyethylene glycol (MIRALAX) 17 gram/dose powder Take 17 g by mouth once daily. 1700 g 2    vitamin D (VITAMIN D3) 1000 units Tab Take 1,000 Units by mouth once daily.       No current facility-administered medications for this visit.       Assessment:   1. Primary hypertension  BP in office 123/82. However, given her previous pressures in office as well as numbers from her home log, will INCREASE HCTZ from 12.5 to 25 qd.   Continue amlodipine 10 qd.   Continue to keep daily BP log.   Symptoms of hypotension discussed.   Advised increased potassium intake through diet.     Low Sodium Diet (DASH Diet - Less than 2 grams of sodium per day, high in fruits, vegetables, potassium, calcium, and magnesium; low in fat).  Monitor blood pressure daily and log. BP logs provided. Report consistent numbers greater than 140/90.  Smoking cessation encouraged to aid in BP reduction.  Maintain healthy weight with goal BMI <30. Exercise 30 minutes per day, 5 days per week.   Limit alcohol consumption to 2 drinks or less per day for men and 1 drink or less per day for women       Return to clinic in 1-2 weeks for BP check.     Giovana Sheets  [facility] Resident

## 2025-02-10 ENCOUNTER — OFFICE VISIT (OUTPATIENT)
Dept: FAMILY MEDICINE | Facility: CLINIC | Age: 65
End: 2025-02-10
Payer: MEDICAID

## 2025-02-10 VITALS
TEMPERATURE: 98 F | HEIGHT: 63 IN | RESPIRATION RATE: 18 BRPM | SYSTOLIC BLOOD PRESSURE: 123 MMHG | WEIGHT: 134 LBS | BODY MASS INDEX: 23.74 KG/M2 | OXYGEN SATURATION: 100 % | DIASTOLIC BLOOD PRESSURE: 82 MMHG | HEART RATE: 89 BPM

## 2025-02-10 DIAGNOSIS — I10 PRIMARY HYPERTENSION: Primary | ICD-10-CM

## 2025-02-10 PROCEDURE — 99214 OFFICE O/P EST MOD 30 MIN: CPT | Mod: PBBFAC

## 2025-02-10 RX ORDER — HYDROCHLOROTHIAZIDE 25 MG/1
25 TABLET ORAL DAILY
Qty: 30 TABLET | Refills: 11 | Status: SHIPPED | OUTPATIENT
Start: 2025-02-10 | End: 2026-02-10

## 2025-02-12 NOTE — PROGRESS NOTES
Date of encounter 02-10-25.   HTN; amlodipine / HCTZ.    Resident's note reviewed 02-11-25.  Agree with assessment; plan of care appropriate.  Home BP measurements; monitor for effectiveness / potential adverse medication effects.  Professional services provided in an outpatient primary care center affiliated with a teaching institution.

## 2025-02-17 ENCOUNTER — OFFICE VISIT (OUTPATIENT)
Dept: FAMILY MEDICINE | Facility: CLINIC | Age: 65
End: 2025-02-17
Payer: MEDICAID

## 2025-02-17 VITALS
SYSTOLIC BLOOD PRESSURE: 130 MMHG | DIASTOLIC BLOOD PRESSURE: 82 MMHG | OXYGEN SATURATION: 100 % | BODY MASS INDEX: 23.99 KG/M2 | HEART RATE: 98 BPM | TEMPERATURE: 98 F | HEIGHT: 63 IN | WEIGHT: 135.38 LBS

## 2025-02-17 DIAGNOSIS — E78.5 HYPERLIPIDEMIA, UNSPECIFIED HYPERLIPIDEMIA TYPE: Chronic | ICD-10-CM

## 2025-02-17 DIAGNOSIS — I10 PRIMARY HYPERTENSION: Primary | ICD-10-CM

## 2025-02-17 PROCEDURE — 99213 OFFICE O/P EST LOW 20 MIN: CPT | Mod: PBBFAC

## 2025-02-17 RX ORDER — LIDOCAINE 50 MG/G
1 PATCH TOPICAL DAILY
Qty: 30 PATCH | Refills: 2 | Status: SHIPPED | OUTPATIENT
Start: 2025-02-17

## 2025-02-17 RX ORDER — AMLODIPINE BESYLATE 10 MG/1
10 TABLET ORAL DAILY
Qty: 30 TABLET | Refills: 11 | Status: SHIPPED | OUTPATIENT
Start: 2025-02-17

## 2025-02-17 RX ORDER — ATORVASTATIN CALCIUM 20 MG/1
20 TABLET, FILM COATED ORAL DAILY
Qty: 90 TABLET | Refills: 1 | Status: SHIPPED | OUTPATIENT
Start: 2025-02-17

## 2025-02-17 RX ORDER — HYDROCHLOROTHIAZIDE 25 MG/1
25 TABLET ORAL DAILY
Qty: 30 TABLET | Refills: 11 | Status: SHIPPED | OUTPATIENT
Start: 2025-02-17 | End: 2026-02-17

## 2025-02-17 RX ORDER — POLYETHYLENE GLYCOL 3350 17 G/17G
17 POWDER, FOR SOLUTION ORAL DAILY
Qty: 1700 G | Refills: 2 | Status: SHIPPED | OUTPATIENT
Start: 2025-02-17

## 2025-02-17 NOTE — PROGRESS NOTES
"[facility] OFFICE VISIT NOTE  Cookie Ugalde  [id number]  02/17/2025    Chief Complaint   Patient presents with    Follow-up    Hypertension     Cookie Ugalde is a 64 y.o. female  presenting to [facility] for BP check.     Meds: Amlodipine 10. Increased HCTZ 12.5 qd to 25 qd last visit.   Adherent to meds.   BP in office 130/82, repeat 128/84.  Brings BP logs. SBPs better than previous, 113-131. DBPs 70s to 80s.   Still is measuring BP at her mother's place, so not able to check BP at consistent times. Unable to afford BP cuff of her own currently.     HLD:  7/2024. Highest LDL on record 184 in 9/2022. She had no PCP prior to 2022. Currently on atorvastatin 10 without issues. Previously had had discussion about titrating up to high intensity statin (see clinic note 1/24/25), and she had opted to stay with atorvastatin 10s. She is agreeable to increasing to 20 qd today.     Objective:   Vitals:    02/17/25 0928   BP: 130/82   BP Location: Left arm   Patient Position: Sitting   Pulse: 98   Temp: 98.2 °F (36.8 °C)   TempSrc: Oral   SpO2: 100%   Weight: 61.4 kg (135 lb 6.4 oz)   Height: 5' 3" (1.6 m)   Repeat BP: 128/84    Physical Exam  General: appears well, in no acute distress     Neck: supple, no lymphadenopathy, no carotid bruits   Respiratory: clear to auscultation bilaterally, nonlabored respirations   Cardiovascular: regular rate and rhythm without murmurs or gallops, no edema in bilateral lower extremities   Gastrointestinal: soft, non-tender, non-distended, bowel sounds present    Musculoskeletal: no gross deformities observed      Current Medications:   Current Outpatient Medications   Medication Sig Dispense Refill    calcium carbonate-vitamin D3 (CALCIUM 600 WITH VITAMIN D3) 600 mg-12.5 mcg (500 unit) Cap Take 1 capsule by mouth once. for 1 dose 90 capsule 1    amLODIPine (NORVASC) 10 MG tablet Take 1 tablet (10 mg total) by mouth once daily. 90 tablet 1    atorvastatin (LIPITOR) 20 MG tablet Take " 1 tablet (20 mg total) by mouth once daily. 90 tablet 1    blood pressure monitor (BLOOD PRESSURE KIT) Kit 1 each by Misc.(Non-Drug; Combo Route) route once daily. 1 each 0    diclofenac sodium (VOLTAREN) 1 % Gel Apply 2 g topically 2 (two) times daily. 450 g 1    hydroCHLOROthiazide (HYDRODIURIL) 25 MG tablet Take 1 tablet (25 mg total) by mouth once daily. 30 tablet 11    LIDOcaine (LIDODERM) 5 % Place 1 patch onto the skin once daily. Apply 1 patch to affected area for no more than 12 hours. Remove patch, and maintain 12 hours free of lidocaine before applying the next patch. Apply a new patch for 12 of every 24 hours as needed. 30 patch 2    polyethylene glycol (MIRALAX) 17 gram/dose powder Take 17 g by mouth once daily. 1700 g 2    vitamin D (VITAMIN D3) 1000 units Tab Take 1,000 Units by mouth once daily.       No current facility-administered medications for this visit.       Assessment:   1. Primary hypertension  Continue amlodipine 10 qd and HCTZ 25 qd. Consider combination pill next visit if BP is controlled.    Continue to keep daily BP log.    Low Sodium Diet (DASH Diet - Less than 2 grams of sodium per day, high in fruits, vegetables, potassium, calcium, and magnesium; low in fat).  Monitor blood pressure daily and log. BP logs provided. Report consistent numbers greater than 140/90.  Smoking cessation encouraged to aid in BP reduction.  Maintain healthy weight with goal BMI <30. Exercise 30 minutes per day, 5 days per week.   Limit alcohol consumption to 2 drinks or less per day for men and 1 drink or less per day for women     2. Hyperlipidemia  INCREASE atorvastatin to 20 qd, shared medical decision making. Reiterated current guidelines for statin therapy.     Return to clinic in  3 months for HTN and HLD follow up.    Giovana Sheets  [facility] Resident

## 2025-02-18 NOTE — PROGRESS NOTES
Faculty addendum: Care provided reasonable and necessary. I participated in the management of the patient and was immediately available throughout the encounter. Services were furnished in a primary care center located in the outpatient department of a teaching hospital. I agree with the resident's findings and plan as documented in the resident's note.     Alena Green, DO

## 2025-04-29 DIAGNOSIS — C50.919 TRIPLE NEGATIVE MALIGNANT NEOPLASM OF BREAST: Primary | ICD-10-CM

## 2025-04-29 DIAGNOSIS — Z17.421 TRIPLE NEGATIVE MALIGNANT NEOPLASM OF BREAST: Primary | ICD-10-CM

## 2025-04-29 DIAGNOSIS — Z15.01 BRCA2 GENE MUTATION POSITIVE: ICD-10-CM

## 2025-04-29 DIAGNOSIS — Z15.09 BRCA2 GENE MUTATION POSITIVE: ICD-10-CM

## 2025-05-02 ENCOUNTER — TELEPHONE (OUTPATIENT)
Dept: HEMATOLOGY/ONCOLOGY | Facility: CLINIC | Age: 65
End: 2025-05-02
Payer: MEDICAID

## 2025-05-04 NOTE — PROGRESS NOTES
[facility] Hematology/Oncology  PROGRESS NOTE    Subjective:       Patient ID: Cookie Ugalde is a 64 y.o. female.    Diagnosis:   IDC Right Breast, Triple Negative     Current Treatment:  Zometa IV Q 6 months   Started 2022    Treatment History:  -Mediport placed on 2022  -AC x4 cycles (2022-2022)  -DD Taxol Q2 weeks x4 cycles (2022-2022)   -mediport removal 2023  Olaparib 2022-2023    Chief Complaint: Follow-up (Patient reports no new concerns as of today.)    HPI:  62 -year-old female referred from surgery clinic, with newly diagnosed right breast cancer.  She never palpated breast mass. She noticed breast/chest wall pain after extensive muscle use in 2021. At that time, she was diagnosed with musculoskeletal pain. In January, she went back to emergency department when symptoms did not resolve. At that time, physical examination apparently noted irregularity in breast and she was referred for further imaging and biopsy revealing for triple negative breast cancer.      Past medical history: Tobacco abuse. Hypertension. Arthritis of right shoulder.  Procedure/surgical history:  Umbilical hernia repair .  [facility] BSO 2022-S/p Left mastectomy and mediport removed   Social history: . Lives in [place], [place]. Has 4 children. Works as a  in a [employer]. Has been smoking 5 cigarettes daily for 15 years. Drinks half a pint of liquor over the weekend. No illicit drugs  Menstrual and OB/GYN history: Menarche, age 14. Menopause, late 40s. First child at age 26 years. No abortions or miscarriages. Used birth control pills for 8 years. Did not use hormone replacement therapy after menopause. Breast-fed 1 child for 7 months.  Family history:  2 brothers experienced prostate cancer in their 50s  Father  from prostate cancer in his 60s-70s  No family history of breast cancer    Interval History:  Patient presents to clinic for a  scheduled breast cancer surveillance visit she is due to receive Zometa today. Zometa on hold due to upcoming dental work, dental clearance given to patient.   She reports doing well and denies any side effects she denies any unintentional weight loss, headache, abdominal pain, chest pain, shortness of breath, changes in bowel or bladder patterns, fever, or frequent infection.  Labs and plan of care reviewed in detail with patient, verbalized understanding    PMX/PSHx  Past Medical History:   Diagnosis Date    Malignant neoplasm of right breast in female, estrogen receptor negative     infiltrating ductal, s/p right simple mastectomy, SNL biopsy, triple neg    Personal history of colonic polyps 08/30/2023    colonoscopy    Primary hypertension     Vitamin D deficiency       Past Surgical History:   Procedure Laterality Date    COLONOSCOPY W/ BIOPSIES AND POLYPECTOMY  08/30/2023    Brett Ye MD    CYSTOSCOPY N/A 02/07/2023    Procedure: CYSTOSCOPY;  Surgeon: Joelle Lira MD;  Location: [facility];  Service: OB/GYN;  Laterality: N/A;    HERNIA REPAIR      HYSTERECTOMY, TOTAL, LAPAROSCOPIC, WITH SALPINGO-OOPHORECTOMY N/A 02/07/2023    Procedure: HYSTERECTOMY,TOTAL,LAPAROSCOPIC,WITH SALPINGO-OOPHORECTOMY;  Surgeon: Joelle Lira MD;  Location: [facility] OR;  Service: OB/GYN;  Laterality: N/A;  enseal and luken's trap    MEDIPORT INSERTION, SINGLE  05/23/2022    REMOVAL OF VASCULAR ACCESS PORT Left 12/14/2022    Procedure: REMOVAL, VASCULAR ACCESS PORT;  Surgeon: Sofie Powers MD;  Location: [facility] OR;  Service: General;  Laterality: Left;    SIMPLE MASTECTOMY Left 12/14/2022    Procedure: MASTECTOMY, SIMPLE;  Surgeon: Sofie Powers MD;  Location: [facility] OR;  Service: General;  Laterality: Left;    SIMPLE MASTECTOMY W/ SENTINEL NODE BIOPSY Right 04/11/2022    UMBILICAL HERNIA REPAIR N/A 1966     Allergies:  Review of patient's allergies indicates:  No Known Allergies   Social History:   Social  History[1]  Medications:  Current Outpatient Medications   Medication Instructions    amLODIPine (NORVASC) 10 mg, Oral, Daily    atorvastatin (LIPITOR) 20 mg, Oral, Daily    blood pressure monitor (BLOOD PRESSURE KIT) Kit 1 each, Misc.(Non-Drug; Combo Route), Daily    calcium carbonate-vitamin D3 (CALCIUM 600 WITH VITAMIN D3) 600 mg-12.5 mcg (500 unit) Cap 1 capsule, Oral, Once    diclofenac sodium (VOLTAREN) 2 g, Topical (Top), 2 times daily    hydroCHLOROthiazide (HYDRODIURIL) 25 mg, Oral, Daily    LIDOcaine (LIDODERM) 5 % 1 patch, Transdermal, Daily, Apply 1 patch to affected area for no more than 12 hours. Remove patch, and maintain 12 hours free of lidocaine before applying the next patch. Apply a new patch for 12 of every 24 hours as needed.    polyethylene glycol (MIRALAX) 17 g, Oral, Daily    vitamin D (VITAMIN D3) 1,000 Units, Daily     ROS:  Review of Systems   Constitutional:  Negative for appetite change, chills, fatigue, fever and unexpected weight change.   HENT:  Negative for facial swelling, mouth sores, nosebleeds, sinus pressure/congestion and sore throat.    Eyes:  Negative for photophobia and pain.   Respiratory:  Negative for cough, chest tightness, shortness of breath and wheezing.    Cardiovascular:  Negative for chest pain, palpitations and leg swelling.   Gastrointestinal:  Negative for abdominal pain, blood in stool, change in bowel habit, constipation, diarrhea, nausea and vomiting.   Endocrine: Negative.    Genitourinary:  Negative for dysuria, frequency, hematuria, hot flashes, pelvic pain, urgency and vaginal pain.   Musculoskeletal:  Negative for arthralgias, gait problem, joint swelling and myalgias.   Integumentary:  Negative for pallor, rash, wound, breast mass, breast discharge and breast tenderness.   Allergic/Immunologic: Negative.  Negative for immunocompromised state.   Neurological:  Negative for dizziness, vertigo, syncope, weakness and numbness.   Hematological:  Negative  for adenopathy. Does not bruise/bleed easily.   Psychiatric/Behavioral:  Negative for behavioral problems and dysphoric mood. The patient is not nervous/anxious.    All other systems reviewed and are negative.  Breast: Negative for mass and tenderness      Objective:   Vitals:  Vitals:    05/05/25 1045   BP: 128/84   Pulse: 85   Resp: 20   Temp: 98.3 °F (36.8 °C)      Wt Readings from Last 3 Encounters:   05/05/25 1045 61 kg (134 lb 6.4 oz)   02/17/25 0928 61.4 kg (135 lb 6.4 oz)   02/10/25 0950 60.8 kg (134 lb)      Physical Examination:  Physical Exam  Vitals and nursing note reviewed.   HENT:      Head: Normocephalic and atraumatic.      Mouth/Throat:      Mouth: Mucous membranes are moist.      Pharynx: Oropharynx is clear.   Eyes:      Extraocular Movements: Extraocular movements intact.      Conjunctiva/sclera: Conjunctivae normal.      Pupils: Pupils are equal, round, and reactive to light.   Cardiovascular:      Rate and Rhythm: Normal rate and regular rhythm.   Pulmonary:      Effort: Pulmonary effort is normal.      Breath sounds: Normal breath sounds.   Chest:   Breasts:     Right: Absent.      Left: Absent.   Abdominal:      General: Abdomen is flat. Bowel sounds are normal.      Palpations: Abdomen is soft.   Musculoskeletal:         General: Normal range of motion.      Cervical back: Normal range of motion and neck supple.   Lymphadenopathy:      Cervical:      Right cervical: No superficial or deep cervical adenopathy.     Left cervical: No superficial or deep cervical adenopathy.      Upper Body:      Right upper body: No supraclavicular or axillary adenopathy.      Left upper body: No supraclavicular or axillary adenopathy.   Skin:     General: Skin is warm and dry.   Neurological:      General: No focal deficit present.      Mental Status: She is alert.   Psychiatric:         Mood and Affect: Mood normal.       ECOG SCORE           Labs:  Lab Visit on 05/05/2025   Component Date Value Ref Range  Status    Sodium 05/05/2025 142  136 - 145 mmol/L Final    Potassium 05/05/2025 4.7  3.5 - 5.1 mmol/L Final    Chloride 05/05/2025 106  98 - 107 mmol/L Final    CO2 05/05/2025 27  23 - 31 mmol/L Final    Glucose 05/05/2025 99  82 - 115 mg/dL Final    Blood Urea Nitrogen 05/05/2025 13.7  9.8 - 20.1 mg/dL Final    Creatinine 05/05/2025 0.71  0.55 - 1.02 mg/dL Final    Calcium 05/05/2025 9.6  8.4 - 10.2 mg/dL Final    Protein Total 05/05/2025 7.8 (H)  5.8 - 7.6 gm/dL Final    Albumin 05/05/2025 4.1  3.4 - 4.8 g/dL Final    Globulin 05/05/2025 3.7 (H)  2.4 - 3.5 gm/dL Final    Albumin/Globulin Ratio 05/05/2025 1.1  1.1 - 2.0 ratio Final    Bilirubin Total 05/05/2025 0.6  <=1.5 mg/dL Final    ALP 05/05/2025 72  40 - 150 unit/L Final    ALT 05/05/2025 19  0 - 55 unit/L Final    AST 05/05/2025 22  11 - 45 unit/L Final    eGFR 05/05/2025 >60  mL/min/1.73/m2 Final    Estimated GFR calculated using the CKD-EPI creatinine (2021) equation.    Anion Gap 05/05/2025 9.0  mEq/L Final    BUN/Creatinine Ratio 05/05/2025 19   Final    WBC 05/05/2025 7.52  4.50 - 11.50 x10(3)/mcL Final    RBC 05/05/2025 4.30  4.20 - 5.40 x10(6)/mcL Final    Hgb 05/05/2025 13.3  12.0 - 16.0 g/dL Final    Hct 05/05/2025 40.5  37.0 - 47.0 % Final    MCV 05/05/2025 94.2 (H)  80.0 - 94.0 fL Final    MCH 05/05/2025 30.9  27.0 - 31.0 pg Final    MCHC 05/05/2025 32.8 (L)  33.0 - 36.0 g/dL Final    RDW 05/05/2025 12.8  11.5 - 17.0 % Final    Platelet 05/05/2025 305  130 - 400 x10(3)/mcL Final    MPV 05/05/2025 9.3  7.4 - 10.4 fL Final    Neut % 05/05/2025 64.0  % Final    Lymph % 05/05/2025 26.6  % Final    Mono % 05/05/2025 6.5  % Final    Eos % 05/05/2025 1.9  % Final    Basophil % 05/05/2025 0.7  % Final    Imm Grans % 05/05/2025 0.3  % Final    Neut # 05/05/2025 4.82  2.1 - 9.2 x10(3)/mcL Final    Lymph # 05/05/2025 2.00  0.6 - 4.6 x10(3)/mcL Final    Mono # 05/05/2025 0.49  0.1 - 1.3 x10(3)/mcL Final    Eos # 05/05/2025 0.14  0 - 0.9 x10(3)/mcL Final     Baso # 05/05/2025 0.05  <=0.2 x10(3)/mcL Final    Imm Gran # 05/05/2025 0.02  0.00 - 0.04 x10(3)/mcL Final    NRBC% 05/05/2025 0.0  % Final        Pathology:  IDC right breast, triple negative  Right simple mastectomy 04/11/2022  2.2 cm, grade 3, no LVI, margins negative, 4 lymph nodes negative  T2 pN0 MX, G3, triple negative, AJCC anatomic stage IIA, pathological prognostic stage IIA    Radiology/Diagnostics:  -11/18/2022:  Bilateral screening mammography with tomosynthesis (comparison:  03/18/2022 mammogram): Both breasts benign, BI-RADS 2 (continue annual screening mammography with digital breast tomosynthesis)       I have reviewed all available lab results and radiology reports.  Assessment:     Problem List Items Addressed This Visit       BRCA2 gene mutation positive    H/O bilateral mastectomy    Triple negative malignant neoplasm of breast - Primary         Plan:    05/05/2025  Labs and exam stable  Hold zometa due to broken tooth, proceed once cleared by dentist  Bilateral mastectomy therefore no need for mammogram  Return to clinic in 6 months with labs and NP  CBC and CMP    Providers:             Assessment:  #IDC right breast, triple negative:  To summarize:  IDC right breast, triple negative  Right simple mastectomy 04/11/2022  2.2 cm, grade 3, no LVI, margins negative, 4 lymph nodes negative  T2 pN0 MX, G3, triple negative, AJCC anatomic stage IIA, pathological prognostic stage IIA  Postmenopausal per labs (04/28/2022)  S/P adjuvant ddAC x4, then dose dense Taxol x4 (05/30/2022-09/12/2022)   -genetic testing:  positive for deleterious mutation: BRCA2 p.* ([id number]).   -11/01/2022:  Started Zometa 4 mg IV (every 6 months x3-5 years) (for risk reduction of distant metastasis)  -11/18/2022:  Bilateral screening mammography with tomosynthesis (comparison:  03/18/2022 mammogram): Both breasts benign, BI-RADS 2 (continue annual screening mammography with digital breast tomosynthesis)     Plan:  IDC  right breast, triple negative   pT2 pN0; S/P Bilateral mastectomy   Does not need adjuvant radiotherapy       -Consider adjuvant bisphosphonate therapy for risk reduction of distant metastasis for 3-5 years in postmenopausal patients (natural or induced) with high risk node-negative or node positive tumors  -postmenopausal per labs 04/28/2022  -10/05/2022: Dental clearance obtained   -Zometa 4 mg IV every 6 months x3-5 years (until 11/2027)    -adjuvant Zometa started 11/01/2022  -11/4/24 cracked tooth need to be pulled, lost dental insurance coverage, in search of new dental services   -Hold Zometa for now until evaluated by dental----will not proceed with Zometa without updated dental clearance     -Follow up with NP in 6 months with lab work  (cbc,cmp) prior to Zometa infusion pending dental clearance      Continue breast cancer surveillance  -History and physical exam 1-4 times per year for 5 years (04/2022-04/2027), then annually;  -Mammogram every 12 months, with no clear advantage to shorter interval imaging;  -Educate, monitor, and referred for lymphedema management  -In the absence of clinical signs or symptoms suggestive of recurrent disease, there is no indication for laboratory or imaging studies for metastasis screening;  -Active lifestyle, healthy diet, limited alcohol intake, and achieving and maintaining an ideal body weight (20-25 BMI) may lead to optimal breast cancer outcomes.  >>>  -S/P Bilateral mastectomy Therefore, no role of surveillance mammography    Above discussed at length with the patient.  All questions answered.     Survivorship treatment summary and care plan discussed with patient. We reviewed her diagnosis and previous treatment that was given. Discussed plans for future follow-up and monitoring. All current medical, psychosocial and practical issues were addressed. Potential late-term side effects reviewed. Counseled on healthy lifestyle and behavior modifications that could  reduce risk of recurrence. Limit alcohol to less than one drink per day, Exercise at least 150 minutes per week of moderate intensity aerobic activity or at least 75 minutes of vigorous activity, Maintaining a healthy weight, Limit red meat to no more than 2-3x per week, Reduce processed foods and meat. Also encouraged wide variety of fruits and vegetables, specifically cruciferous vegetables.     I spent a total of 15 minutes on the day of the visit.This includes face to face time and non-face to face time preparing to see the patient (eg, review of tests), obtaining and/or reviewing separately obtained history, documenting clinical information in the electronic or other health record, independently interpreting results and communicating results to the patient/family/caregiver, or care coordinator.  Patient states understanding with no further questions.     I have explained all of the above in detail and the patient understands all of the current recommendation(s). I have answered all of their questions to the best of my ability and to their complete satisfaction.       Lian Hendricks, FNP-C  Oncology/Hematology   [facility]           [1]   Social History  Tobacco Use    Smoking status: Former     Current packs/day: 0.00     Average packs/day: 0.5 packs/day for 24.0 years (12.0 ttl pk-yrs)     Types: Cigarettes     Start date: 1998     Quit date: 2022     Years since quitting: 3.3    Smokeless tobacco: Never   Substance Use Topics    Alcohol use: Not Currently    Drug use: Yes     Types: Marijuana     Comment: on weekends

## 2025-05-05 ENCOUNTER — OFFICE VISIT (OUTPATIENT)
Dept: HEMATOLOGY/ONCOLOGY | Facility: CLINIC | Age: 65
End: 2025-05-05
Payer: MEDICAID

## 2025-05-05 ENCOUNTER — INFUSION (OUTPATIENT)
Dept: INFUSION THERAPY | Facility: HOSPITAL | Age: 65
End: 2025-05-05
Attending: INTERNAL MEDICINE
Payer: MEDICAID

## 2025-05-05 ENCOUNTER — LAB VISIT (OUTPATIENT)
Dept: HEMATOLOGY/ONCOLOGY | Facility: CLINIC | Age: 65
End: 2025-05-05
Payer: MEDICAID

## 2025-05-05 VITALS
BODY MASS INDEX: 23.81 KG/M2 | RESPIRATION RATE: 20 BRPM | SYSTOLIC BLOOD PRESSURE: 128 MMHG | HEART RATE: 85 BPM | HEIGHT: 63 IN | OXYGEN SATURATION: 100 % | WEIGHT: 134.38 LBS | TEMPERATURE: 98 F | DIASTOLIC BLOOD PRESSURE: 84 MMHG

## 2025-05-05 DIAGNOSIS — C50.919 TRIPLE NEGATIVE MALIGNANT NEOPLASM OF BREAST: Primary | ICD-10-CM

## 2025-05-05 DIAGNOSIS — Z17.421 TRIPLE NEGATIVE MALIGNANT NEOPLASM OF BREAST: Primary | ICD-10-CM

## 2025-05-05 DIAGNOSIS — Z15.09 BRCA2 GENE MUTATION POSITIVE: ICD-10-CM

## 2025-05-05 DIAGNOSIS — Z90.13 H/O BILATERAL MASTECTOMY: ICD-10-CM

## 2025-05-05 DIAGNOSIS — Z17.421 TRIPLE NEGATIVE MALIGNANT NEOPLASM OF BREAST: ICD-10-CM

## 2025-05-05 DIAGNOSIS — Z15.01 BRCA2 GENE MUTATION POSITIVE: ICD-10-CM

## 2025-05-05 DIAGNOSIS — C50.919 TRIPLE NEGATIVE MALIGNANT NEOPLASM OF BREAST: ICD-10-CM

## 2025-05-05 LAB
ALBUMIN SERPL-MCNC: 4.1 G/DL (ref 3.4–4.8)
ALBUMIN/GLOB SERPL: 1.1 RATIO (ref 1.1–2)
ALP SERPL-CCNC: 72 UNIT/L (ref 40–150)
ALT SERPL-CCNC: 19 UNIT/L (ref 0–55)
ANION GAP SERPL CALC-SCNC: 9 MEQ/L
AST SERPL-CCNC: 22 UNIT/L (ref 11–45)
BASOPHILS # BLD AUTO: 0.05 X10(3)/MCL
BASOPHILS NFR BLD AUTO: 0.7 %
BILIRUB SERPL-MCNC: 0.6 MG/DL
BUN SERPL-MCNC: 13.7 MG/DL (ref 9.8–20.1)
CALCIUM SERPL-MCNC: 9.6 MG/DL (ref 8.4–10.2)
CHLORIDE SERPL-SCNC: 106 MMOL/L (ref 98–107)
CO2 SERPL-SCNC: 27 MMOL/L (ref 23–31)
CREAT SERPL-MCNC: 0.71 MG/DL (ref 0.55–1.02)
CREAT/UREA NIT SERPL: 19
EOSINOPHIL # BLD AUTO: 0.14 X10(3)/MCL (ref 0–0.9)
EOSINOPHIL NFR BLD AUTO: 1.9 %
ERYTHROCYTE [DISTWIDTH] IN BLOOD BY AUTOMATED COUNT: 12.8 % (ref 11.5–17)
GFR SERPLBLD CREATININE-BSD FMLA CKD-EPI: >60 ML/MIN/1.73/M2
GLOBULIN SER-MCNC: 3.7 GM/DL (ref 2.4–3.5)
GLUCOSE SERPL-MCNC: 99 MG/DL (ref 82–115)
HCT VFR BLD AUTO: 40.5 % (ref 37–47)
HGB BLD-MCNC: 13.3 G/DL (ref 12–16)
IMM GRANULOCYTES # BLD AUTO: 0.02 X10(3)/MCL (ref 0–0.04)
IMM GRANULOCYTES NFR BLD AUTO: 0.3 %
LYMPHOCYTES # BLD AUTO: 2 X10(3)/MCL (ref 0.6–4.6)
LYMPHOCYTES NFR BLD AUTO: 26.6 %
MCH RBC QN AUTO: 30.9 PG (ref 27–31)
MCHC RBC AUTO-ENTMCNC: 32.8 G/DL (ref 33–36)
MCV RBC AUTO: 94.2 FL (ref 80–94)
MONOCYTES # BLD AUTO: 0.49 X10(3)/MCL (ref 0.1–1.3)
MONOCYTES NFR BLD AUTO: 6.5 %
NEUTROPHILS # BLD AUTO: 4.82 X10(3)/MCL (ref 2.1–9.2)
NEUTROPHILS NFR BLD AUTO: 64 %
NRBC BLD AUTO-RTO: 0 %
PLATELET # BLD AUTO: 305 X10(3)/MCL (ref 130–400)
PMV BLD AUTO: 9.3 FL (ref 7.4–10.4)
POTASSIUM SERPL-SCNC: 4.7 MMOL/L (ref 3.5–5.1)
PROT SERPL-MCNC: 7.8 GM/DL (ref 5.8–7.6)
RBC # BLD AUTO: 4.3 X10(6)/MCL (ref 4.2–5.4)
SODIUM SERPL-SCNC: 142 MMOL/L (ref 136–145)
WBC # BLD AUTO: 7.52 X10(3)/MCL (ref 4.5–11.5)

## 2025-05-05 PROCEDURE — 1159F MED LIST DOCD IN RCRD: CPT | Mod: CPTII,,,

## 2025-05-05 PROCEDURE — 1160F RVW MEDS BY RX/DR IN RCRD: CPT | Mod: CPTII,,,

## 2025-05-05 PROCEDURE — 3079F DIAST BP 80-89 MM HG: CPT | Mod: CPTII,,,

## 2025-05-05 PROCEDURE — 3008F BODY MASS INDEX DOCD: CPT | Mod: CPTII,,,

## 2025-05-05 PROCEDURE — 3074F SYST BP LT 130 MM HG: CPT | Mod: CPTII,,,

## 2025-05-05 PROCEDURE — 99214 OFFICE O/P EST MOD 30 MIN: CPT | Mod: PBBFAC

## 2025-05-05 PROCEDURE — 80053 COMPREHEN METABOLIC PANEL: CPT

## 2025-05-05 PROCEDURE — 99214 OFFICE O/P EST MOD 30 MIN: CPT | Mod: S$PBB,,,

## 2025-05-05 PROCEDURE — 85025 COMPLETE CBC W/AUTO DIFF WBC: CPT

## 2025-05-05 NOTE — NURSING
Patient's zometa HELD. Patient had an appointment with Lian Hendricks NP. Chandra Baugh LPN. Stated that zometa was held because patient did not have a dental clearance.   
74

## 2025-05-19 ENCOUNTER — OFFICE VISIT (OUTPATIENT)
Dept: FAMILY MEDICINE | Facility: CLINIC | Age: 65
End: 2025-05-19
Payer: MEDICAID

## 2025-05-19 VITALS
TEMPERATURE: 98 F | DIASTOLIC BLOOD PRESSURE: 81 MMHG | WEIGHT: 132 LBS | HEIGHT: 63 IN | SYSTOLIC BLOOD PRESSURE: 133 MMHG | OXYGEN SATURATION: 100 % | HEART RATE: 86 BPM | RESPIRATION RATE: 20 BRPM | BODY MASS INDEX: 23.39 KG/M2

## 2025-05-19 DIAGNOSIS — E88.09 HYPERPROTEINEMIA: ICD-10-CM

## 2025-05-19 DIAGNOSIS — Z13.1 SCREENING FOR DIABETES MELLITUS: ICD-10-CM

## 2025-05-19 DIAGNOSIS — I10 PRIMARY HYPERTENSION: Primary | ICD-10-CM

## 2025-05-19 DIAGNOSIS — E78.5 HYPERLIPIDEMIA, UNSPECIFIED HYPERLIPIDEMIA TYPE: ICD-10-CM

## 2025-05-19 DIAGNOSIS — E55.9 VITAMIN D DEFICIENCY: ICD-10-CM

## 2025-05-19 PROBLEM — Z90.11 S/P RIGHT MASTECTOMY: Status: RESOLVED | Noted: 2022-09-25 | Resolved: 2025-05-19

## 2025-05-19 LAB
25(OH)D3+25(OH)D2 SERPL-MCNC: 78 NG/ML (ref 30–80)
CHOLEST SERPL-MCNC: 146 MG/DL
CHOLEST/HDLC SERPL: 3 {RATIO} (ref 0–5)
EST. AVERAGE GLUCOSE BLD GHB EST-MCNC: 114 MG/DL
HBA1C MFR BLD: 5.6 %
HDLC SERPL-MCNC: 57 MG/DL (ref 35–60)
IGA SERPL-MCNC: 126 MG/DL (ref 69–517)
IGG SERPL-MCNC: 1450 MG/DL (ref 522–1631)
IGM SERPL-MCNC: 89 MG/DL (ref 33–293)
LDLC SERPL CALC-MCNC: 78 MG/DL (ref 50–140)
TRIGL SERPL-MCNC: 56 MG/DL (ref 37–140)
TSH SERPL-ACNC: 0.76 UIU/ML (ref 0.35–4.94)
VLDLC SERPL CALC-MCNC: 11 MG/DL

## 2025-05-19 PROCEDURE — 82306 VITAMIN D 25 HYDROXY: CPT

## 2025-05-19 PROCEDURE — 84443 ASSAY THYROID STIM HORMONE: CPT

## 2025-05-19 PROCEDURE — 83036 HEMOGLOBIN GLYCOSYLATED A1C: CPT

## 2025-05-19 PROCEDURE — 84165 PROTEIN E-PHORESIS SERUM: CPT

## 2025-05-19 PROCEDURE — 80061 LIPID PANEL: CPT

## 2025-05-19 PROCEDURE — 83521 IG LIGHT CHAINS FREE EACH: CPT

## 2025-05-19 PROCEDURE — 82784 ASSAY IGA/IGD/IGG/IGM EACH: CPT

## 2025-05-19 PROCEDURE — 99213 OFFICE O/P EST LOW 20 MIN: CPT | Mod: PBBFAC

## 2025-05-19 RX ORDER — LIDOCAINE 50 MG/G
1 PATCH TOPICAL DAILY
Qty: 30 PATCH | Refills: 2 | Status: SHIPPED | OUTPATIENT
Start: 2025-05-19

## 2025-05-19 RX ORDER — BIOTIN 5 MG
1 CAPSULE ORAL ONCE
Qty: 90 CAPSULE | Refills: 1 | Status: SHIPPED | OUTPATIENT
Start: 2025-05-19 | End: 2025-05-21

## 2025-05-19 RX ORDER — AMLODIPINE BESYLATE 10 MG/1
10 TABLET ORAL DAILY
Qty: 90 TABLET | Refills: 1 | Status: SHIPPED | OUTPATIENT
Start: 2025-05-19

## 2025-05-19 RX ORDER — HYDROCHLOROTHIAZIDE 25 MG/1
25 TABLET ORAL DAILY
Qty: 90 TABLET | Refills: 1 | Status: SHIPPED | OUTPATIENT
Start: 2025-05-19

## 2025-05-19 RX ORDER — BIOTIN 5 MG
1 CAPSULE ORAL ONCE
Qty: 90 CAPSULE | Refills: 1 | Status: SHIPPED | OUTPATIENT
Start: 2025-05-19 | End: 2025-05-19

## 2025-05-19 RX ORDER — POLYETHYLENE GLYCOL 3350 17 G/17G
17 POWDER, FOR SOLUTION ORAL DAILY
Qty: 1700 G | Refills: 2 | Status: SHIPPED | OUTPATIENT
Start: 2025-05-19

## 2025-05-19 RX ORDER — ATORVASTATIN CALCIUM 40 MG/1
40 TABLET, FILM COATED ORAL DAILY
Qty: 90 TABLET | Refills: 3 | Status: SHIPPED | OUTPATIENT
Start: 2025-05-19 | End: 2026-05-19

## 2025-05-19 NOTE — PROGRESS NOTES
Discussed with the resident at the time of the appointment; immediately available. The chart was reviewed. I agree with the assessment and plan. Care provided was reasonable and necessary. Professional services provided in an outpatient primary care center affiliated with a teaching institution.

## 2025-05-19 NOTE — PROGRESS NOTES
"[facility] OFFICE VISIT NOTE  Cookie Ugalde  [id number]  05/19/2025    Chief Complaint   Patient presents with    Hypertension    Follow-up     Cookie Ugalde is a 64 y.o. female  presenting to [facility] for routine follow up.      Hypertension:   Adherent to Amlodipine 10, HCTZ 25 qd last visit.  BP controlled in office.   Does not bring BP logs today. Reports number <140/90 at home, typically 130s/80s.   Denies headaches, dyspnea, chest pain, vision changes.     HLD: Atorvastatin increased to 20 last visit. Tolerating well, denies myalgia.       Problem List:   Right shoulder pain: PRN lido patch. PT was ordered at one time, but patient refused saying "I can do that at home".   Breast CA:  Breast clinic q6m. BRCA2+ status, stage IIa triple negative Breast cancer with Bilateral mastectomy 12/16/22 2/2 IDC (completed Olaparib 300 mg bid.) Started Zometa 11/2022, q6m with Hematology for risk reduction of distant mets. Currently on hold since last year due to dental issues.   Lung nodule: seen on LDCT 7/31/23 5 mm nodule in the lung, follows with Pulmonology. Recent CT Chest 8/1/24 No detrimental change. Quit smoking 2022, history of 30 pack year smoking. Next Pulm visit 8/2025.   Hypertension- as above  Hyperlipidemia-  Atorvastatin 20 qd.  7/2024. Highest LDL on record 184 in 9/2022. She had no PCP prior to 2022.    Constipation- Miralax    Maintenance:  Breast CA: see above  Pap: TLH/risk reducing BSO/cystoscopy on 2/7/23 in the setting of BRCA2+ status  Colonoscopy: 8/2023 with [facility], diverticula, transverse colon 8-9mm tubulovillous adenoma, cecum tubular adenoma. No FH of colon CA. No follow up on file, would rec 3 year follow up.   Lung CA:  see above  DEXA: 8/2/23: within normal limits. used to drink EtOH 1/2 pint whiskey for about 30 year on weekends, quit 1 year ago. No FH or personal hx of fractures. Started Zometa 11/2022, q6m with Hematology for risk reduction of distant mets. Currently on hold " "since last year. Taking Calcium+VT D.   Vaccine: UTD    Objective:   Vitals:    05/19/25 0946   BP: 133/81   BP Location: Left arm   Patient Position: Sitting   Pulse: 86   Resp: 20   Temp: 98.1 °F (36.7 °C)   TempSrc: Oral   SpO2: 100%   Weight: 59.9 kg (132 lb)   Height: 5' 3" (1.6 m)        Physical Exam  General: appears well, in no acute distress     Neck: supple, no lymphadenopathy, no carotid bruits   Respiratory: clear to auscultation bilaterally, nonlabored respirations   Cardiovascular: regular rate and rhythm without murmurs or gallops, no edema in bilateral lower extremities   Gastrointestinal: soft, non-tender, non-distended, bowel sounds present    Musculoskeletal: no gross deformities observed      Current Medications:   Current Outpatient Medications   Medication Sig Dispense Refill    amLODIPine (NORVASC) 10 MG tablet Take 1 tablet (10 mg total) by mouth once daily. 90 tablet 1    atorvastatin (LIPITOR) 40 MG tablet Take 1 tablet (40 mg total) by mouth once daily. 90 tablet 3    blood pressure monitor (BLOOD PRESSURE KIT) Kit 1 each by Misc.(Non-Drug; Combo Route) route once daily. 1 each 0    calcium carbonate-vitamin D3 (CALCIUM 600 WITH VITAMIN D3) 600 mg-12.5 mcg (500 unit) Cap Take 1 capsule by mouth once. for 1 dose 90 capsule 1    diclofenac sodium (VOLTAREN) 1 % Gel Apply 2 g topically 2 (two) times daily. 450 g 1    hydroCHLOROthiazide (HYDRODIURIL) 25 MG tablet Take 1 tablet (25 mg total) by mouth once daily. 90 tablet 1    LIDOcaine (LIDODERM) 5 % Place 1 patch onto the skin once daily. Apply 1 patch to affected area for no more than 12 hours. Remove patch, and maintain 12 hours free of lidocaine before applying the next patch. Apply a new patch for 12 of every 24 hours as needed. 30 patch 2    polyethylene glycol (MIRALAX) 17 gram/dose powder Take 17 g by mouth once daily. 1700 g 2    vitamin D (VITAMIN D3) 1000 units Tab Take 1,000 Units by mouth once daily.       No current " facility-administered medications for this visit.       Assessment:   1. Primary hypertension  - TSH  - amLODIPine (NORVASC) 10 MG tablet; Take 1 tablet (10 mg total) by mouth once daily.  Dispense: 90 tablet; Refill: 1  Continue amlodipine 10 qd and HCTZ 25 qd.    Low Sodium Diet (DASH Diet - Less than 2 grams of sodium per day, high in fruits, vegetables, potassium, calcium, and magnesium; low in fat).  Monitor blood pressure daily and log. BP logs provided. Report consistent numbers greater than 140/90.  Smoking cessation encouraged to aid in BP reduction.  Maintain healthy weight with goal BMI <30. Exercise 30 minutes per day, 5 days per week.   Limit alcohol consumption to 2 drinks or less per day for men and 1 drink or less per day for women    2. Hyperlipidemia, unspecified hyperlipidemia type  - Lipid Panel  - INCREASE atorvastatin to 40 qd.     3. Vitamin D deficiency  - Vitamin D    4. Hyperproteinemia  - Immunofixation & Protein Electrophoresis & Immunoglobulins    5. Screening for diabetes mellitus  - Hemoglobin A1C       Return to clinic in  3 months for routine follow up.     Giovana Sheets  [facility] Resident

## 2025-05-20 LAB
ALBUMIN % SPEP (OHS): 48.02 (ref 48.1–59.5)
ALBUMIN SERPL-MCNC: 3.9 G/DL (ref 3.4–4.8)
ALBUMIN/GLOB SERPL: 0.9 RATIO (ref 1.1–2)
ALPHA 1 GLOB (OHS): 0.35 GM/DL (ref 0–0.4)
ALPHA 1 GLOB% (OHS): 4.26 (ref 2.3–4.9)
ALPHA 2 GLOB % (OHS): 10.53 (ref 6.9–13)
ALPHA 2 GLOB (OHS): 0.86 GM/DL (ref 0.4–1)
BETA GLOB (OHS): 1.38 GM/DL (ref 0.7–1.3)
BETA GLOB% (OHS): 16.86 (ref 13.8–19.7)
GAMMA GLOBULIN % (OHS): 20.33 (ref 10.1–21.9)
GAMMA GLOBULIN (OHS): 1.67 GM/DL (ref 0.4–1.8)
GLOBULIN SER-MCNC: 4.3 GM/DL (ref 2.4–3.5)
KAPPA LC FREE SER NEPH-MCNC: 1.96 MG/DL (ref 0.33–1.94)
KAPPA LC FREE/LAMBDA FREE SER NEPH: 0.84 {RATIO} (ref 0.26–1.65)
LAMBDA LC FREE SERPL-MCNC: 2.33 MG/DL (ref 0.57–2.63)
M SPIKE % (OHS): ABNORMAL
M SPIKE (OHS): ABNORMAL
PATH REV: NORMAL
PROT SERPL-MCNC: 8.2 GM/DL (ref 5.8–7.6)

## 2025-05-21 ENCOUNTER — OFFICE VISIT (OUTPATIENT)
Dept: GYNECOLOGY | Facility: CLINIC | Age: 65
End: 2025-05-21
Payer: MEDICAID

## 2025-05-21 VITALS
SYSTOLIC BLOOD PRESSURE: 122 MMHG | DIASTOLIC BLOOD PRESSURE: 80 MMHG | HEART RATE: 79 BPM | RESPIRATION RATE: 20 BRPM | WEIGHT: 129.63 LBS | TEMPERATURE: 99 F | OXYGEN SATURATION: 100 % | BODY MASS INDEX: 22.97 KG/M2 | HEIGHT: 63 IN

## 2025-05-21 DIAGNOSIS — Z01.419 ENCOUNTER FOR ANNUAL ROUTINE GYNECOLOGICAL EXAMINATION: Primary | ICD-10-CM

## 2025-05-21 PROCEDURE — 3044F HG A1C LEVEL LT 7.0%: CPT | Mod: CPTII,,, | Performed by: NURSE PRACTITIONER

## 2025-05-21 PROCEDURE — 1159F MED LIST DOCD IN RCRD: CPT | Mod: CPTII,,, | Performed by: NURSE PRACTITIONER

## 2025-05-21 PROCEDURE — 99213 OFFICE O/P EST LOW 20 MIN: CPT | Mod: PBBFAC | Performed by: NURSE PRACTITIONER

## 2025-05-21 PROCEDURE — 3008F BODY MASS INDEX DOCD: CPT | Mod: CPTII,,, | Performed by: NURSE PRACTITIONER

## 2025-05-21 PROCEDURE — 3074F SYST BP LT 130 MM HG: CPT | Mod: CPTII,,, | Performed by: NURSE PRACTITIONER

## 2025-05-21 PROCEDURE — 3079F DIAST BP 80-89 MM HG: CPT | Mod: CPTII,,, | Performed by: NURSE PRACTITIONER

## 2025-05-21 PROCEDURE — 99396 PREV VISIT EST AGE 40-64: CPT | Mod: S$PBB,,, | Performed by: NURSE PRACTITIONER

## 2025-05-21 NOTE — PROGRESS NOTES
"  [facility] -  Gynecology / Women's Health Clinic    Subjective:       Patient ID: Cookie Ugalde is a 64 y.o. female.    Chief Complaint:  Gynecologic Exam    History of Present Illness  The patient  here for annual exam. H/o stage IIA triple negative BRCA2+ IDC right breast and s/p bilateral mastectomy in  for triple negative IDC in right breast followed by oncology. Pt is s/p TLH/RRBSO/Cystoscopy on 25 d/t breast cancer history. Denies urinary complaints. Denies pelvic pain, abnormal bleeding or discharge. Pt reports no STIs in the past and no concerns. Quit tobacco use. Dep. screening 0. DEXA WNL in . Denies fly hx of ovarian, uterine or colon cancer. Paternal aunt with breast cancer.    GYN & OB History  No LMP recorded. Patient is postmenopausal.   Date of Last Pap: 2022    Review of patient's allergies indicates:  No Known Allergies  Past Medical History:   Diagnosis Date    Hyperlipidemia     Malignant neoplasm of right breast in female, estrogen receptor negative     infiltrating ductal, s/p right simple mastectomy, SNL biopsy, triple neg    Personal history of colonic polyps 2023    colonoscopy    Primary hypertension     Vitamin D deficiency      OB History    Para Term  AB Living   4 4 4 0 0 4   SAB IAB Ectopic Multiple Live Births             # Outcome Date GA Lbr [place]/2nd Weight Sex Type Anes PTL Lv   4 Term      Vag-Spont      3 Term      Vag-Spont      2 Term      Vag-Spont      1 Term      Vag-Spont           Review of Systems  Review of Systems    Negative except for pertinent findings for positives per HPI     Objective:    Physical Exam    /80 (BP Location: Left arm, Patient Position: Sitting)   Pulse 79   Temp 98.6 °F (37 °C) (Oral)   Resp 20   Ht 5' 3" (1.6 m)   Wt 58.8 kg (129 lb 9.6 oz)   SpO2 100%   BMI 22.96 kg/m²   GENERAL: Well-developed female in no acute distress.  SKIN: Normal to inspection,warm, dry and " intact.  BREASTS: No rashes or erythema. No masses, lumps, discharge, tenderness.  VULVA: General appearance WNL; external genitalia with no lesions or erythema.  BIMANUAL EXAM: Vaginal mucosa/vault pink, Vaginal cuff intact. Uterus/Cervix surgically absent. Taran adnexa reveal no tenderness.  PSYCHIATRIC: Patient is oriented to person, place, and time. Mood and affect are normal.    Assessment:         ICD-10-CM ICD-9-CM   1. Encounter for annual routine gynecological examination  Z01.419 V72.31     Plan:   Cookie was seen today for gynecologic exam.    Diagnoses and all orders for this visit:    Encounter for annual routine gynecological examination    Pelvic today, pap deferred d/t hysterectomy  Follow up in about 1 year (around 5/21/2026) for annual exam.

## 2025-08-18 ENCOUNTER — HOSPITAL ENCOUNTER (OUTPATIENT)
Dept: RADIOLOGY | Facility: HOSPITAL | Age: 65
Discharge: HOME | End: 2025-08-18
Attending: INTERNAL MEDICINE
Payer: MEDICAID

## 2025-08-18 DIAGNOSIS — R91.1 SOLITARY PULMONARY NODULE: ICD-10-CM

## 2025-08-18 PROCEDURE — 71250 CT THORAX DX C-: CPT | Mod: TC

## 2025-08-21 ENCOUNTER — LAB VISIT (OUTPATIENT)
Dept: LAB | Facility: HOSPITAL | Age: 65
End: 2025-08-21
Attending: NURSE PRACTITIONER
Payer: MEDICAID

## 2025-08-21 DIAGNOSIS — Z12.11 SPECIAL SCREENING FOR MALIGNANT NEOPLASMS, COLON: Primary | ICD-10-CM

## 2025-08-21 LAB
HEMOCCULT SP1 STL QL: NEGATIVE
HEMOCCULT SP2 STL QL: NEGATIVE
HEMOCCULT SP3 STL QL: NEGATIVE

## 2025-08-21 PROCEDURE — 82270 OCCULT BLOOD FECES: CPT

## 2025-08-25 ENCOUNTER — OFFICE VISIT (OUTPATIENT)
Dept: PULMONOLOGY | Facility: CLINIC | Age: 65
End: 2025-08-25
Payer: MEDICAID

## 2025-08-25 VITALS
RESPIRATION RATE: 18 BRPM | BODY MASS INDEX: 22 KG/M2 | TEMPERATURE: 98 F | HEART RATE: 69 BPM | HEIGHT: 63 IN | OXYGEN SATURATION: 100 % | SYSTOLIC BLOOD PRESSURE: 136 MMHG | WEIGHT: 124.19 LBS | DIASTOLIC BLOOD PRESSURE: 75 MMHG

## 2025-08-25 DIAGNOSIS — R91.8 MULTIPLE LUNG NODULES: ICD-10-CM

## 2025-08-25 DIAGNOSIS — Z87.891 HISTORY OF TOBACCO ABUSE: Primary | ICD-10-CM

## 2025-08-25 PROCEDURE — 1159F MED LIST DOCD IN RCRD: CPT | Mod: CPTII,,, | Performed by: INTERNAL MEDICINE

## 2025-08-25 PROCEDURE — 3008F BODY MASS INDEX DOCD: CPT | Mod: CPTII,,, | Performed by: INTERNAL MEDICINE

## 2025-08-25 PROCEDURE — 3044F HG A1C LEVEL LT 7.0%: CPT | Mod: CPTII,,, | Performed by: INTERNAL MEDICINE

## 2025-08-25 PROCEDURE — 99214 OFFICE O/P EST MOD 30 MIN: CPT | Mod: PBBFAC

## 2025-08-25 PROCEDURE — 3078F DIAST BP <80 MM HG: CPT | Mod: CPTII,,, | Performed by: INTERNAL MEDICINE

## 2025-08-25 PROCEDURE — 3075F SYST BP GE 130 - 139MM HG: CPT | Mod: CPTII,,, | Performed by: INTERNAL MEDICINE

## 2025-08-25 PROCEDURE — 99214 OFFICE O/P EST MOD 30 MIN: CPT | Mod: S$PBB,,, | Performed by: INTERNAL MEDICINE

## 2025-08-25 RX ORDER — PSYLLIUM HUSK 0.4 G
600 CAPSULE ORAL DAILY
COMMUNITY

## 2025-09-05 ENCOUNTER — OFFICE VISIT (OUTPATIENT)
Dept: FAMILY MEDICINE | Facility: CLINIC | Age: 65
End: 2025-09-05
Payer: MEDICAID

## 2025-09-05 VITALS
TEMPERATURE: 98 F | BODY MASS INDEX: 22.18 KG/M2 | DIASTOLIC BLOOD PRESSURE: 87 MMHG | HEART RATE: 95 BPM | OXYGEN SATURATION: 100 % | WEIGHT: 125.19 LBS | HEIGHT: 63 IN | SYSTOLIC BLOOD PRESSURE: 123 MMHG

## 2025-09-05 DIAGNOSIS — I10 PRIMARY HYPERTENSION: Primary | ICD-10-CM

## 2025-09-05 PROCEDURE — 99213 OFFICE O/P EST LOW 20 MIN: CPT | Mod: PBBFAC

## (undated) DEVICE — SET CYSTO IRRIGATION UNIV SPIK

## (undated) DEVICE — ADHESIVE DERMABOND ADVANCED

## (undated) DEVICE — TRAY CATH FOL SIL URIMTR 16FR

## (undated) DEVICE — SPONGE LAP STRL 18X18IN

## (undated) DEVICE — Device

## (undated) DEVICE — DEVICE CLSR PDS 0 CT-1 12IN

## (undated) DEVICE — KIT SAHARA DRAPE DRAW/LIFT

## (undated) DEVICE — MANIFOLD 4 PORT

## (undated) DEVICE — GLOVE PROTEXIS BLUE LATEX 6.5

## (undated) DEVICE — GLOVE PROTEXIS HYDROGEL SZ7.5

## (undated) DEVICE — GLOVE PROTEXIS NEU-THERA SZ6

## (undated) DEVICE — GLOVE PROTEXIS LTX MICRO  7.5

## (undated) DEVICE — DRAIN JACKSON PRATT TRCR 19FR

## (undated) DEVICE — GLOVE PROTEXIS HYDROGEL SZ6

## (undated) DEVICE — SUT 2/0 30IN SILK BLK BRAI

## (undated) DEVICE — TRAP MEDI-VAC SPEC MUCS 40CC

## (undated) DEVICE — SOL NORMAL USPCA 0.9%

## (undated) DEVICE — BLADE #15 STERILE CARBON

## (undated) DEVICE — HANDLE DEVON RIGID OR LIGHT

## (undated) DEVICE — SEE L#152161

## (undated) DEVICE — TRAY SKIN SCRUB WET PREMIUM

## (undated) DEVICE — SOLIDIFIER BOTTLE 1500CC

## (undated) DEVICE — SUT ETHILON 2-0 FS 18IN BLK

## (undated) DEVICE — GLOVE PROTEXIS LTX MICRO 6

## (undated) DEVICE — SYR 10CC LUER LOCK

## (undated) DEVICE — APPLICATOR CHLORAPREP ORN 26ML

## (undated) DEVICE — PAD PINK TRENDELENBURG POS XL

## (undated) DEVICE — GAUZE SPONGE 4X4 12PLY

## (undated) DEVICE — KIT LAPAROSCOPY UNIVERSITY

## (undated) DEVICE — MANIP ARCH KOH-EFCNT UTER 3.5

## (undated) DEVICE — GLOVE PROTEXIS BLUE LATEX 7

## (undated) DEVICE — SOL NACL IRR 3000ML

## (undated) DEVICE — DRAPE UINDERBUT GRAD PCH

## (undated) DEVICE — TIP RUMI WHITE DISP UMW676

## (undated) DEVICE — GLOVE PROTEXIS HYDROGEL SZ6.5

## (undated) DEVICE — ELECTRODE PATIENT RETURN DISP

## (undated) DEVICE — PAD PREP CUFFED NS 24X48IN

## (undated) DEVICE — ELECTRODE BLADE INSULATED 1 IN

## (undated) DEVICE — SEALER TISSUE ENSEAL X1 37CM

## (undated) DEVICE — HEMOSTAT SURGICEL 2X3IN

## (undated) DEVICE — PENCIL ELECSURG ROCKER 15FT

## (undated) DEVICE — SCRUB DYNA-HEX LIQ 4% CHG 4OZ

## (undated) DEVICE — TROCAR KII FIOS 5MM X 100MM

## (undated) DEVICE — SLEEVE KII ADV FIX 5X100MM

## (undated) DEVICE — TIP SUCTION YANKAUER

## (undated) DEVICE — GLOVE PROTEXIS LTX MICRO  7

## (undated) DEVICE — NDL HYPO REG 25G X 1 1/2

## (undated) DEVICE — GOWN POLY REINF BRTH SLV XL

## (undated) DEVICE — GLOVE PROTEXIS LTX MICRO 6.5

## (undated) DEVICE — CLOSURE SKIN STERI STRIP 1/2X4

## (undated) DEVICE — SUT 3-0 MONOCRYL PLUS PS-2

## (undated) DEVICE — SUT MCRYL PLUS 4-0 PS2 27IN

## (undated) DEVICE — SPONGE DERMACEA GAUZE 4X4

## (undated) DEVICE — DRAIN SURG HUBLESS 30CM 15FR

## (undated) DEVICE — GLOVE PROTEXIS BLUE LATEX 7.5

## (undated) DEVICE — TOWEL OR DISP STRL BLUE 4/PK

## (undated) DEVICE — RESERVOIR JACKSON-PRATT 100CC

## (undated) DEVICE — SCALPEL #10 BLADE STRL DISP

## (undated) DEVICE — GOWN SMARTSLEEVE AAMI LVL4 LG

## (undated) DEVICE — PACK DRAPE PERI/GYN TIBURON

## (undated) DEVICE — SOL IRRI STRL WATER 1000ML

## (undated) DEVICE — SYR 50CC LL

## (undated) DEVICE — IRRIGATOR SUCTION W/TIP

## (undated) DEVICE — SET CYSTO IRR DRP CHMBR 84IN